# Patient Record
Sex: FEMALE | Race: WHITE | Employment: FULL TIME | URBAN - METROPOLITAN AREA
[De-identification: names, ages, dates, MRNs, and addresses within clinical notes are randomized per-mention and may not be internally consistent; named-entity substitution may affect disease eponyms.]

---

## 2017-03-21 NOTE — PROGRESS NOTES
"  SUBJECTIVE:                                                    Santa Kinsey is a 41 year old female who presents to clinic today for the following health issues:      HPI    URINARY TRACT SYMPTOMS     Onset: 2 weeks ago    Description:   Painful urination (Dysuria): no   Blood in urine (Hematuria): no   Delay in urine (Hesitancy): no - urinating to much    Intensity: moderate    Progression of Symptoms:  worsening    Accompanying Signs & Symptoms:  Fever/chills: no   Flank pain no   Nausea and vomiting: no   Any vaginal symptoms: vaginal discharge  Abdominal/Pelvic Pain: YES   History:   History of frequent UTI's: no   History of kidney stones: no   Sexually Active: no   Possibility of pregnancy: No    Precipitating factors:   no         Therapies Tried and outcome: Increase fluid intake, not helping    Started to notice increase in vaginal discharge. No other symptoms other than some bloating. No concerns for STDs. No pain with urination.     Hemorrhoids:  Has not had them in the past, thought that they popped the other day as she did see some blood in the toilet, not from bowel movement. She reports she did have some pain when this occurred, not having any since using the Preparation H. She reports that she wants to make sure they go away. She does report she does not eat a lot of fiber and occasionally gets constipated.       Problem list and histories reviewed & adjusted, as indicated.  Additional history: as documented        ROS:  C: NEGATIVE for fever, chills, change in weight  E/M: NEGATIVE for ear, mouth and throat problems  R: NEGATIVE for significant cough or SOB  CV: NEGATIVE for chest pain, palpitations or peripheral edema    OBJECTIVE:                                                    /64  Pulse 67  Temp 97.2  F (36.2  C) (Temporal)  Resp 12  Ht 5' 3.19\" (1.605 m)  Wt 116 lb 9.6 oz (52.9 kg)  SpO2 100%  BMI 20.53 kg/m2  Body mass index is 20.53 kg/(m^2).  Physical Exam "   Constitutional: She is oriented to person, place, and time. Vital signs are normal.   Cardiovascular: Normal rate, regular rhythm and normal heart sounds.    Pulmonary/Chest: Effort normal and breath sounds normal.   Abdominal: Soft. Normal appearance and bowel sounds are normal. There is no tenderness.   Genitourinary: Rectal exam shows external hemorrhoid. Rectal exam shows no internal hemorrhoid, no fissure, no tenderness and anal tone normal.   Neurological: She is alert and oriented to person, place, and time.   Skin: Skin is warm, dry and intact.       Diagnostic Test Results:  Results for orders placed or performed in visit on 03/23/17   *UA reflex to Microscopic and Culture ([facility] (except [place] and [place])   Result Value Ref Range    Color Urine Yellow     Appearance Urine Clear     Glucose Urine Negative NEG mg/dL    Bilirubin Urine Negative NEG    Ketones Urine Negative NEG mg/dL    Specific Gravity Urine 1.010 1.003 - 1.035    Blood Urine Trace (A) NEG    pH Urine 7.0 5.0 - 7.0 pH    Protein Albumin Urine Negative NEG mg/dL    Urobilinogen Urine 0.2 0.2 - 1.0 EU/dL    Nitrite Urine Negative NEG    Leukocyte Esterase Urine Small (A) NEG    Source Midstream Urine    Urine Microscopic   Result Value Ref Range    WBC Urine O - 2 0 - 2 /HPF    RBC Urine O - 2 0 - 2 /HPF    Squamous Epithelial /LPF Urine Few FEW /LPF    Bacteria Urine Few (A) NEG /HPF   Wet prep   Result Value Ref Range    Specimen Description Vagina     Wet Prep No Trichomonas seen  Clue cells seen  Yeast seen   (A)     Micro Report Status FINAL 03/23/2017         ASSESSMENT/PLAN:                                                        1. Bacterial vaginosis  - Treat with Flagyl 500mg BID X 7 days.   - Hand out discussed with patient.     2. Candidiasis of vulva and vagina  - Treatment as indicated below   - fluconazole (DIFLUCAN) 150 MG tablet; Take 1 tablet (150 mg) by mouth once for 1 dose  Dispense:  1 tablet; Refill: 0    3. External hemorrhoids  - Discussed the importance of a high fiber diet and increased water   intake.   - Will give Anusol to see if this helps  - Continue to monitor symptoms.   - Will re-evaluate next week when I see her back.   - No bleeding was present on exam and the hemorrhoids were very small without thrombus.   - hydrocortisone (ANUSOL-HC) 2.5 % cream; Place rectally 2 times   daily  Dispense: 28.35 g; Refill: 0    See Patient Instructions      Patient had multiple concerns at visit. Covered as much as we could today. She will make an appointment next week for me to evaluate her foot wart with possible freezing. We will also recheck her hemorrhoids at this time.       KALA Dee [facility]          Answers for HPI/ROS submitted by the patient on 3/23/2017   If you checked off any problems, how difficult have these problems made it for you to do your work, take care of things at home, or get along with other people?: Not difficult at all  PHQ9 TOTAL SCORE: 4  TRACEY 7 TOTAL SCORE: 15

## 2017-03-23 ENCOUNTER — OFFICE VISIT (OUTPATIENT)
Dept: FAMILY MEDICINE | Facility: OTHER | Age: 42
End: 2017-03-23
Payer: COMMERCIAL

## 2017-03-23 VITALS
BODY MASS INDEX: 20.66 KG/M2 | TEMPERATURE: 97.2 F | HEART RATE: 67 BPM | DIASTOLIC BLOOD PRESSURE: 64 MMHG | RESPIRATION RATE: 12 BRPM | OXYGEN SATURATION: 100 % | WEIGHT: 116.6 LBS | HEIGHT: 63 IN | SYSTOLIC BLOOD PRESSURE: 108 MMHG

## 2017-03-23 DIAGNOSIS — K64.4 EXTERNAL HEMORRHOIDS: ICD-10-CM

## 2017-03-23 DIAGNOSIS — B37.31 CANDIDIASIS OF VULVA AND VAGINA: ICD-10-CM

## 2017-03-23 DIAGNOSIS — N76.0 BACTERIAL VAGINOSIS: Primary | ICD-10-CM

## 2017-03-23 DIAGNOSIS — B96.89 BACTERIAL VAGINOSIS: Primary | ICD-10-CM

## 2017-03-23 LAB
ALBUMIN UR-MCNC: NEGATIVE MG/DL
APPEARANCE UR: CLEAR
BACTERIA #/AREA URNS HPF: ABNORMAL /HPF
BILIRUB UR QL STRIP: NEGATIVE
COLOR UR AUTO: YELLOW
GLUCOSE UR STRIP-MCNC: NEGATIVE MG/DL
HGB UR QL STRIP: ABNORMAL
KETONES UR STRIP-MCNC: NEGATIVE MG/DL
LEUKOCYTE ESTERASE UR QL STRIP: ABNORMAL
MICRO REPORT STATUS: ABNORMAL
NITRATE UR QL: NEGATIVE
NON-SQ EPI CELLS #/AREA URNS LPF: ABNORMAL /LPF
PH UR STRIP: 7 PH (ref 5–7)
RBC #/AREA URNS AUTO: ABNORMAL /HPF (ref 0–2)
SP GR UR STRIP: 1.01 (ref 1–1.03)
SPECIMEN SOURCE: ABNORMAL
URN SPEC COLLECT METH UR: ABNORMAL
UROBILINOGEN UR STRIP-ACNC: 0.2 EU/DL (ref 0.2–1)
WBC #/AREA URNS AUTO: ABNORMAL /HPF (ref 0–2)
WET PREP SPEC: ABNORMAL

## 2017-03-23 PROCEDURE — 99214 OFFICE O/P EST MOD 30 MIN: CPT | Performed by: NURSE PRACTITIONER

## 2017-03-23 PROCEDURE — 81001 URINALYSIS AUTO W/SCOPE: CPT | Performed by: NURSE PRACTITIONER

## 2017-03-23 PROCEDURE — 87210 SMEAR WET MOUNT SALINE/INK: CPT | Performed by: NURSE PRACTITIONER

## 2017-03-23 RX ORDER — METRONIDAZOLE 500 MG/1
500 TABLET ORAL 2 TIMES DAILY
Qty: 14 TABLET | Refills: 0 | Status: SHIPPED | OUTPATIENT
Start: 2017-03-23 | End: 2017-09-25

## 2017-03-23 RX ORDER — FLUCONAZOLE 150 MG/1
150 TABLET ORAL ONCE
Qty: 1 TABLET | Refills: 0 | Status: SHIPPED | OUTPATIENT
Start: 2017-03-23 | End: 2017-03-23

## 2017-03-23 ASSESSMENT — ANXIETY QUESTIONNAIRES
7. FEELING AFRAID AS IF SOMETHING AWFUL MIGHT HAPPEN: 2 = MORE THAN HALF THE DAYS
GAD7 TOTAL SCORE: 15
GAD7 TOTAL SCORE: 15

## 2017-03-23 ASSESSMENT — PATIENT HEALTH QUESTIONNAIRE - PHQ9
10. IF YOU CHECKED OFF ANY PROBLEMS, HOW DIFFICULT HAVE THESE PROBLEMS MADE IT FOR YOU TO DO YOUR WORK, TAKE CARE OF THINGS AT HOME, OR GET ALONG WITH OTHER PEOPLE: NOT DIFFICULT AT ALL
SUM OF ALL RESPONSES TO PHQ QUESTIONS 1-9: 4

## 2017-03-23 ASSESSMENT — PAIN SCALES - GENERAL: PAINLEVEL: MILD PAIN (3)

## 2017-03-23 NOTE — NURSING NOTE
"Chief Complaint   Patient presents with     UTI     Panel Management     height, tdap, lipid, dap, phq9       Initial /64  Pulse 67  Temp 97.2  F (36.2  C) (Temporal)  Resp 12  Ht 5' 3.19\" (1.605 m)  Wt 116 lb 9.6 oz (52.9 kg)  SpO2 100%  BMI 20.53 kg/m2 Estimated body mass index is 20.53 kg/(m^2) as calculated from the following:    Height as of this encounter: 5' 3.19\" (1.605 m).    Weight as of this encounter: 116 lb 9.6 oz (52.9 kg).  Medication Reconciliation: complete  Robyn Beatty CMA (AAMA)    "

## 2017-03-23 NOTE — MR AVS SNAPSHOT
After Visit Summary   3/23/2017    Santa Kinsey    MRN: [id number]           Patient Information     Date Of Birth          1975        Visit Information        Provider Department      3/23/2017 6:30 PM Julia Christensen APRN CNP [facility]        Today's Diagnoses     Bacterial vaginosis    -  1    Candidiasis of vulva and vagina        External hemorrhoids          Care Instructions    - Start antibiotic treatment for bacterial vaginosis  - Start antifungal one time dose for yeast infection  - If you develop fevers, chills, or increased abdominal pain please go into the emergency room.   - If your symptoms do not improve with treatment please return to clinic.   - Increase your fiber intake and start the Anusol cream and follow up next Tuesday in clinic.       KALA Dee CNP    Bacterial Vaginosis    You have a vaginal infection called bacterial vaginosis (BV). Both good and bad bacteria are present in a healthy vagina. BV occurs when these bacteria get out of balance. The number of bad bacteria increase. And the number of good bacteria decrease.  BV may or may not cause symptoms. If symptoms do occur, they can include:    Thin, gray, milky-white, or sometimes green discharge    Unpleasant odor or  fishy  smell    Itching, burning, or pain in or around the vagina  It is not known what causes BV, but certain factors can make the problem more likely. This can include:    Douching    Having sex with a new partner    Having sex with more than one partner  BV will sometimes go away on its own. But treatment is usually recommended. This is because untreated BV can increase the risk of more serious health problems such as:    Pelvic inflammatory disease (PID)     delivery (giving birth to a baby early if you re pregnant)    HIV and certain other sexually transmitted diseases (STDs)    Infection after surgery on the reproductive organs  Home care  General care    BV  is most often treated with medicines called antibiotics. These may be given as pills or as a vaginal cream. If antibiotics are prescribed, be sure to use them exactly as directed. Also, be sure to complete all of the medicine, even if your symptoms go away.    Avoid douching or having sex during treatment.    If you have sex with a female partner, ask your healthcare provider if she should also be treated.  Prevention    Limit or avoid douching.    Avoid having sex. If you do have sex, then take steps to lower your risk:    Use condoms when having sex.    Limit the number of partners you have sex with.  Follow-up care  Follow up with your healthcare provider, or as advised.  When to seek medical advice  Call your healthcare provider right away if:    You have a fever of 100.4 F (38 C) or higher, or as directed by your provider.    Your symptoms worsen, or they don t go away within a few days of starting treatment.    You have new pain in the lower belly or pelvic region.    You have side effects that bother you or a reaction to the pills or cream you re prescribed.    You or any partners you have sex with have new symptoms, such as a rash, joint pain, or sores.    7524-7167 [employer]. [address] [place] [address]. All rights reserved. This information is not intended as a substitute for professional medical care. Always follow your healthcare professional's instructions.              Follow-ups after your visit        Follow-up notes from your care team     Return if symptoms worsen or fail to improve.      Your next 10 appointments already scheduled     Mar 28, 2017  4:20 PM CDT   Office Visit with KALA Lau CNP   [facility] ([facility]    [address] [place] [id number]   [phone]           Bring a current list of meds and any records pertaining to this visit.  For Physicals, please bring immunization records and any  "forms needing to be filled out.  Please arrive 10 minutes early to complete paperwork.              Who to contact     If you have questions or need follow up information about today's clinic visit or your schedule please contact [facility] directly at [phone].  Normal or non-critical lab and imaging results will be communicated to you by MyChart, letter or phone within 4 business days after the clinic has received the results. If you do not hear from us within 7 days, please contact the clinic through [employer] or phone. If you have a critical or abnormal lab result, we will notify you by phone as soon as possible.  Submit refill requests through [employer] or call your pharmacy and they will forward the refill request to us. Please allow 3 business days for your refill to be completed.          Additional Information About Your Visit        [employer] Information     [employer] gives you secure access to your electronic health record. If you see a primary care provider, you can also send messages to your care team and make appointments. If you have questions, please call your primary care clinic.  If you do not have a primary care provider, please call [phone] and they will assist you.        Care EveryWhere ID     This is your Care EveryWhere ID. This could be used by other organizations to access your [facility] medical records  [id number]        Your Vitals Were     Pulse Temperature Respirations Height Pulse Oximetry BMI (Body Mass Index)    67 97.2  F (36.2  C) (Temporal) 12 5' 3.19\" (1.605 m) 100% 20.53 kg/m2       Blood Pressure from Last 3 Encounters:   03/23/17 108/64   10/10/16 92/58   03/25/16 94/60    Weight from Last 3 Encounters:   03/23/17 116 lb 9.6 oz (52.9 kg)   10/10/16 112 lb (50.8 kg)   03/25/16 114 lb (51.7 kg)              We Performed the Following     *UA reflex to Microscopic and Culture ([facility] (except [place] and [place])     Urine " Microscopic     Wet prep          Today's Medication Changes          These changes are accurate as of: 3/23/17  6:52 PM.  If you have any questions, ask your nurse or doctor.               Start taking these medicines.        Dose/Directions    fluconazole 150 MG tablet   Commonly known as:  DIFLUCAN   Used for:  Candidiasis of vulva and vagina   Started by:  Julia Christensen APRN CNP        Dose:  150 mg   Take 1 tablet (150 mg) by mouth once for 1 dose   Quantity:  1 tablet   Refills:  0       hydrocortisone 2.5 % cream   Commonly known as:  ANUSOL-HC   Used for:  External hemorrhoids   Started by:  Julia Christensen APRN CNP        Place rectally 2 times daily   Quantity:  28.35 g   Refills:  0       metroNIDAZOLE 500 MG tablet   Commonly known as:  FLAGYL   Started by:  Julia Christensen APRN CNP        Dose:  500 mg   Take 1 tablet (500 mg) by mouth 2 times daily   Quantity:  14 tablet   Refills:  0            Where to get your medicines      These medications were sent to [id number] - [place], [place] - [address]  [address] [address]    Hours:  [place] [place] - numbers unchanged   9/8/03  Phone:  [phone]     fluconazole 150 MG tablet    hydrocortisone 2.5 % cream    metroNIDAZOLE 500 MG tablet                Primary Care Provider Office Phone # Fax #    Annemarie Stovall [phone]       [facility]  [address] [facility] [address]        Thank you!     Thank you for choosing [facility]  for your care. Our goal is always to provide you with excellent care. Hearing back from our patients is one way we can continue to improve our services. Please take a few minutes to complete the written survey that you may receive in the mail after your visit with us. Thank you!             Your Updated Medication List - Protect others around you: Learn how to safely use, store and throw away your medicines at www.disposemymeds.org.           This list is accurate as of: 3/23/17  6:52 PM.  Always use your most recent med list.                   Brand Name Dispense Instructions for use    fluconazole 150 MG tablet    DIFLUCAN    1 tablet    Take 1 tablet (150 mg) by mouth once for 1 dose       hydrocortisone 2.5 % cream    ANUSOL-HC    28.35 g    Place rectally 2 times daily       metroNIDAZOLE 500 MG tablet    FLAGYL    14 tablet    Take 1 tablet (500 mg) by mouth 2 times daily

## 2017-03-23 NOTE — LETTER
My Depression Action Plan  Name: Santa Kinsey   Date of Birth 1975  Date: 3/21/2017    My doctor: Annemarie Stovall   My clinic: [address]  [facility] [id number] [phone]          GREEN    ZONE   Good Control    What it looks like:     Things are going generally well. You have normal up s and down s. You may even feel depressed from time to time, but bad moods usually last less than a day.   What you need to do:  1. Continue to care for yourself (see self care plan)  2. Check your depression survival kit and update it as needed  3. Follow your physician s recommendations including any medication.  4. Do not stop taking medication unless you consult with your physician first.           YELLOW         ZONE Getting Worse    What it looks like:     Depression is starting to interfere with your life.     It may be hard to get out of bed; you may be starting to isolate yourself from others.    Symptoms of depression are starting to last most all day and this has happened for several days.     You may have suicidal thoughts but they are not constant.   What you need to do:     1. Call your care team, your response to treatment will improve if you keep your care team informed of your progress. Yellow periods are signs an adjustment may need to be made.     2. Continue your self-care, even if you have to fake it!    3. Talk to someone in your support network    4. Open up your depression survival kit           RED    ZONE Medical Alert - Get Help    What it looks like:     Depression is seriously interfering with your life.     You may experience these or other symptoms: You can t get out of bed most days, can t work or engage in other necessary activities, you have trouble taking care of basic hygiene, or basic responsibilities, thoughts of suicide or death that will not go away, self-injurious behavior.     What you need to do:  1. Call your care team and  request a same-day appointment. If they are not available (weekends or after hours) call your local crisis line, emergency room or 911.      Electronically signed by: Sandra Nava, March 21, 2017    Depression Self Care Plan / Survival Kit    Self-Care for Depression  Here s the deal. Your body and mind are really not as separate as most people think.  What you do and think affects how you feel and how you feel influences what you do and think. This means if you do things that people who feel good do, it will help you feel better.  Sometimes this is all it takes.  There is also a place for medication and therapy depending on how severe your depression is, so be sure to consult with your medical provider and/ or Behavioral Health Consultant if your symptoms are worsening or not improving.     In order to better manage my stress, I will:    Exercise  Get some form of exercise, every day. This will help reduce pain and release endorphins, the  feel good  chemicals in your brain. This is almost as good as taking antidepressants!  This is not the same as joining a gym and then never going! (they count on that by the way ) It can be as simple as just going for a walk or doing some gardening, anything that will get you moving.      Hygiene   Maintain good hygiene (Get out of bed in the morning, Make your bed, Brush your teeth, Take a shower, and Get dressed like you were going to work, even if you are unemployed).  If your clothes don't fit try to get ones that do.    Diet  I will strive to eat foods that are good for me, drink plenty of water, and avoid excessive sugar, caffeine, alcohol, and other mood-altering substances.  Some foods that are helpful in depression are: complex carbohydrates, B vitamins, flaxseed, fish or fish oil, fresh fruits and vegetables.    Psychotherapy  I agree to participate in Individual Therapy (if recommended).    Medication  If prescribed medications, I agree to take them.  Missing doses  can result in serious side effects.  I understand that drinking alcohol, or other illicit drug use, may cause potential side effects.  I will not stop my medication abruptly without first discussing it with my provider.    Staying Connected With Others  I will stay in touch with my friends, family members, and my primary care provider/team.    Use your imagination  Be creative.  We all have a creative side; it doesn t matter if it s oil painting, sand castles, or mud pies! This will also kick up the endorphins.    Witness Beauty  (AKA stop and smell the roses) Take a look outside, even in mid-winter. Notice colors, textures. Watch the squirrels and birds.     Service to others  Be of service to others.  There is always someone else in need.  By helping others we can  get out of ourselves  and remember the really important things.  This also provides opportunities for practicing all the other parts of the program.    Humor  Laugh and be silly!  Adjust your TV habits for less news and crime-drama and more comedy.    Control your stress  Try breathing deep, massage therapy, biofeedback, and meditation. Find time to relax each day.     My support system    Clinic Contact:  Phone number:    Contact 1:  Phone number:    Contact 2:  Phone number:    Yazidism/:  Phone number:    Therapist:  Phone number:    Local crisis center:    Phone number:    Other community support:  Phone number:

## 2017-03-23 NOTE — PATIENT INSTRUCTIONS
- Start antibiotic treatment for bacterial vaginosis  - Start antifungal one time dose for yeast infection  - If you develop fevers, chills, or increased abdominal pain please go into the emergency room.   - If your symptoms do not improve with treatment please return to clinic.   - Increase your fiber intake and start the Anusol cream and follow up next Tuesday in clinic.       KALA Dee CNP    Bacterial Vaginosis    You have a vaginal infection called bacterial vaginosis (BV). Both good and bad bacteria are present in a healthy vagina. BV occurs when these bacteria get out of balance. The number of bad bacteria increase. And the number of good bacteria decrease.  BV may or may not cause symptoms. If symptoms do occur, they can include:    Thin, gray, milky-white, or sometimes green discharge    Unpleasant odor or  fishy  smell    Itching, burning, or pain in or around the vagina  It is not known what causes BV, but certain factors can make the problem more likely. This can include:    Douching    Having sex with a new partner    Having sex with more than one partner  BV will sometimes go away on its own. But treatment is usually recommended. This is because untreated BV can increase the risk of more serious health problems such as:    Pelvic inflammatory disease (PID)     delivery (giving birth to a baby early if you re pregnant)    HIV and certain other sexually transmitted diseases (STDs)    Infection after surgery on the reproductive organs  Home care  General care    BV is most often treated with medicines called antibiotics. These may be given as pills or as a vaginal cream. If antibiotics are prescribed, be sure to use them exactly as directed. Also, be sure to complete all of the medicine, even if your symptoms go away.    Avoid douching or having sex during treatment.    If you have sex with a female partner, ask your healthcare provider if she should also be treated.  Prevention    Limit  or avoid douching.    Avoid having sex. If you do have sex, then take steps to lower your risk:    Use condoms when having sex.    Limit the number of partners you have sex with.  Follow-up care  Follow up with your healthcare provider, or as advised.  When to seek medical advice  Call your healthcare provider right away if:    You have a fever of 100.4 F (38 C) or higher, or as directed by your provider.    Your symptoms worsen, or they don t go away within a few days of starting treatment.    You have new pain in the lower belly or pelvic region.    You have side effects that bother you or a reaction to the pills or cream you re prescribed.    You or any partners you have sex with have new symptoms, such as a rash, joint pain, or sores.    6859-8731 [employer]. [address] [place] [address], [place] [address]. All rights reserved. This information is not intended as a substitute for professional medical care. Always follow your healthcare professional's instructions.

## 2017-03-24 ASSESSMENT — PATIENT HEALTH QUESTIONNAIRE - PHQ9: SUM OF ALL RESPONSES TO PHQ QUESTIONS 1-9: 4

## 2017-03-24 ASSESSMENT — ANXIETY QUESTIONNAIRES: GAD7 TOTAL SCORE: 15

## 2017-03-24 NOTE — PROGRESS NOTES
SUBJECTIVE:                                                    Santa Kinsey is a 41 year old female who presents to clinic today for the following health issues:      HPI    Hemorrhoids     Onset: long time    Description:   Pain: YES  Itching: no     Accompanying Signs & Symptoms:  Blood streaked toilet paper: no   Blood in stool: Streaks  Changes in stool pattern: no    History:   Any previous GI studies done:none  Family History of colon cancer: no     Precipitating factors:   None    Alleviating factors:  None     Therapies Tried and outcome: chewable fiber gummies- no change noticed yet, tried metamucil in the past- no change  Wants to talk about freezing corn on foot today.     Patient reports that she has not seen much improvement with the Anusol cream and is curious if she can have the hemorrhoids removed.   She has started the fiber gummy's and has not seen much of a change yet. She has had relieve of her bloating and vaginal discharge with the BV treatment. Denies abdominal pain. Streaks of the blood from the hemorrhoids. No blood in stool.       Problem list and histories reviewed & adjusted, as indicated.  Additional history: as documented      ROS:  C: NEGATIVE for fever, chills, change in weight    OBJECTIVE:                                                    BP 94/62  Pulse 66  Temp 98  F (36.7  C) (Temporal)  Resp 16  Wt 116 lb 12.8 oz (53 kg)  SpO2 100%  BMI 20.57 kg/m2  Body mass index is 20.57 kg/(m^2).  GENERAL: healthy, alert and no distress  RESP: lungs clear to auscultation - no rales, rhonchi or wheezes  CV: regular rate and rhythm, normal S1 S2, no S3 or S4, no murmur, click or rub, no peripheral edema and peripheral pulses strong  RECTAL: normal sphincter tone, no rectal masses  SKIN: no suspicious lesions or rashes, small area along the plantar surface of the left foot skin circular region with thick area of skin roughed edged, consistent with a callus vs plantar wart.   PSYCH:  mentation appears normal, affect normal/bright    Patient and site identified. Procedure explained to patient. discussed diagnosis probably corn could possibly be plantar wart, will use cryo to see how this responds. Area along the foot identified and area was frozen with 3 cryo cycles with liquid nitrogen. Patient tolerated and home care instructions provided.        ASSESSMENT/PLAN:                                                      1. External hemorrhoids  - Recommend continuing with Anusol cream and fiber diet.   - Consult with general surgery about having clippings. I did not notice any internal hemorrhoids on exam. I would recommend that you monitor for bleeding and increase in pain.   - GENERAL SURG ADULT REFERRAL    2. Corn or callus  - Likely a corn/callus. Will try cryo today as it does have characteristics of a plantar wart. If this does not work I would recommend podiatry for removal since she has failed numerous OTC treatments.     3. Corns and callosities    - DESTRUC BENIGN/PREMAL,1ST LESION [82937]    See Patient Instructions    KALA Dee [facility]          Answers for HPI/ROS submitted by the patient on 3/28/2017   If you checked off any problems, how difficult have these problems made it for you to do your work, take care of things at home, or get along with other people?: Not difficult at all  PHQ9 TOTAL SCORE: 3

## 2017-03-28 ENCOUNTER — OFFICE VISIT (OUTPATIENT)
Dept: FAMILY MEDICINE | Facility: OTHER | Age: 42
End: 2017-03-28
Payer: COMMERCIAL

## 2017-03-28 VITALS
OXYGEN SATURATION: 100 % | RESPIRATION RATE: 16 BRPM | WEIGHT: 116.8 LBS | SYSTOLIC BLOOD PRESSURE: 94 MMHG | TEMPERATURE: 98 F | DIASTOLIC BLOOD PRESSURE: 62 MMHG | BODY MASS INDEX: 20.57 KG/M2 | HEART RATE: 66 BPM

## 2017-03-28 DIAGNOSIS — K64.4 EXTERNAL HEMORRHOIDS: Primary | ICD-10-CM

## 2017-03-28 DIAGNOSIS — L84 CORNS AND CALLOSITIES: ICD-10-CM

## 2017-03-28 DIAGNOSIS — L84 CORN OR CALLUS: ICD-10-CM

## 2017-03-28 PROCEDURE — 99213 OFFICE O/P EST LOW 20 MIN: CPT | Performed by: NURSE PRACTITIONER

## 2017-03-28 ASSESSMENT — PATIENT HEALTH QUESTIONNAIRE - PHQ9
10. IF YOU CHECKED OFF ANY PROBLEMS, HOW DIFFICULT HAVE THESE PROBLEMS MADE IT FOR YOU TO DO YOUR WORK, TAKE CARE OF THINGS AT HOME, OR GET ALONG WITH OTHER PEOPLE: NOT DIFFICULT AT ALL
SUM OF ALL RESPONSES TO PHQ QUESTIONS 1-9: 3

## 2017-03-28 ASSESSMENT — PAIN SCALES - GENERAL: PAINLEVEL: NO PAIN (0)

## 2017-03-28 NOTE — MR AVS SNAPSHOT
After Visit Summary   3/28/2017    Santa Kinsey    MRN: [id number]           Patient Information     Date Of Birth          1975        Visit Information        Provider Department      3/28/2017 4:20 PM Julia Christensen APRN CNP [facility]        Today's Diagnoses     External hemorrhoids    -  1    Corn or callus          Care Instructions    Wash entire area off with soap in 2 hours   - Area may blister, if it does, do not pop, keep covered  - May use OTC product (Compound W) if corn returns, only use after area has completely healed       Apply nightly and cover with bandage   - Can re-treat in clinic in one month     - Follow up with General surgery regarding your hemorrhoids.   - Continue Anusol cream and fiber diet   - Return to me in 1 month.     KALA Dee CNP          Follow-ups after your visit        Additional Services     GENERAL SURG ADULT REFERRAL       Your provider has referred you to: [facility]: [facility] [phone]   [email or web address]    Please be aware that coverage of these services is subject to the terms and limitations of your health insurance plan.  Call member services at your health plan with any benefit or coverage questions.      Please bring the following with you to your appointment:    (1) Any X-Rays, CTs or MRIs which have been performed.  Contact the facility where they were done to arrange for  prior to your scheduled appointment.   (2) List of current medications   (3) This referral request   (4) Any documents/labs given to you for this referral                  Follow-up notes from your care team     Return in about 1 month (around 4/28/2017).      Your next 10 appointments already scheduled     Apr 21, 2017 10:15 AM CDT   New Visit with Aureliano Savage MD   [facility]    [address]  [place] [address]    [phone]              Who to contact     If you have questions or need follow up information about today's clinic visit or your schedule please contact [facility] directly at [phone].  Normal or non-critical lab and imaging results will be communicated to you by MyChart, letter or phone within 4 business days after the clinic has received the results. If you do not hear from us within 7 days, please contact the clinic through MyChart or phone. If you have a critical or abnormal lab result, we will notify you by phone as soon as possible.  Submit refill requests through [employer] or call your pharmacy and they will forward the refill request to us. Please allow 3 business days for your refill to be completed.          Additional Information About Your Visit        [employer] Information     [employer] gives you secure access to your electronic health record. If you see a primary care provider, you can also send messages to your care team and make appointments. If you have questions, please call your primary care clinic.  If you do not have a primary care provider, please call [phone] and they will assist you.        Care EveryWhere ID     This is your Care EveryWhere ID. This could be used by other organizations to access your [place] medical records  [id number]        Your Vitals Were     Pulse Temperature Respirations Pulse Oximetry BMI (Body Mass Index)       66 98  F (36.7  C) (Temporal) 16 100% 20.57 kg/m2        Blood Pressure from Last 3 Encounters:   03/28/17 94/62   03/23/17 108/64   10/10/16 92/58    Weight from Last 3 Encounters:   03/28/17 116 lb 12.8 oz (53 kg)   03/23/17 116 lb 9.6 oz (52.9 kg)   10/10/16 112 lb (50.8 kg)              We Performed the Following     GENERAL SURG ADULT REFERRAL        Primary Care Provider Office Phone # Fax #    Annemarie Stovall [phone]       [facility]  [address] [facility] [address]        Thank you!      Thank you for choosing [facility]  for your care. Our goal is always to provide you with excellent care. Hearing back from our patients is one way we can continue to improve our services. Please take a few minutes to complete the written survey that you may receive in the mail after your visit with us. Thank you!             Your Updated Medication List - Protect others around you: Learn how to safely use, store and throw away your medicines at www.disposemymeds.org.          This list is accurate as of: 3/28/17  4:54 PM.  Always use your most recent med list.                   Brand Name Dispense Instructions for use    hydrocortisone 2.5 % cream    ANUSOL-HC    28.35 g    Place rectally 2 times daily       metroNIDAZOLE 500 MG tablet    FLAGYL    14 tablet    Take 1 tablet (500 mg) by mouth 2 times daily

## 2017-03-28 NOTE — PATIENT INSTRUCTIONS
Wash entire area off with soap in 2 hours   - Area may blister, if it does, do not pop, keep covered  - May use OTC product (Compound W) if corn returns, only use after area has completely healed       Apply nightly and cover with bandage   - Can re-treat in clinic in one month     - Follow up with General surgery regarding your hemorrhoids.   - Continue Anusol cream and fiber diet   - Return to me in 1 month.     KALA Dee CNP

## 2017-03-28 NOTE — NURSING NOTE
"Chief Complaint   Patient presents with     RECHECK     hemorrhoids     Health Maintenance     tetanus, lipid screening, bhavana phq9       Initial BP 94/62  Pulse 66  Temp 98  F (36.7  C) (Temporal)  Resp 16  Wt 116 lb 12.8 oz (53 kg)  SpO2 100%  BMI 20.57 kg/m2 Estimated body mass index is 20.57 kg/(m^2) as calculated from the following:    Height as of 3/23/17: 5' 3.19\" (1.605 m).    Weight as of this encounter: 116 lb 12.8 oz (53 kg).  Medication Reconciliation: complete  Robyn Beatty CMA (AAMA)    "

## 2017-03-29 ASSESSMENT — PATIENT HEALTH QUESTIONNAIRE - PHQ9: SUM OF ALL RESPONSES TO PHQ QUESTIONS 1-9: 3

## 2017-04-10 ENCOUNTER — MYC MEDICAL ADVICE (OUTPATIENT)
Dept: FAMILY MEDICINE | Facility: OTHER | Age: 42
End: 2017-04-10

## 2017-04-10 DIAGNOSIS — K64.4 EXTERNAL HEMORRHOIDS: Primary | ICD-10-CM

## 2017-04-10 DIAGNOSIS — B37.31 CANDIDIASIS OF VULVA AND VAGINA: Primary | ICD-10-CM

## 2017-04-10 RX ORDER — LIDOCAINE 5 G/100G
CREAM RECTAL; TOPICAL
Qty: 1 TUBE | Refills: 1 | Status: SHIPPED | OUTPATIENT
Start: 2017-04-10 | End: 2017-04-10

## 2017-04-10 RX ORDER — HYDROCORTISONE ACETATE 25 MG/1
25 SUPPOSITORY RECTAL 2 TIMES DAILY
Qty: 28 SUPPOSITORY | Refills: 0 | Status: SHIPPED | OUTPATIENT
Start: 2017-04-10 | End: 2017-04-10

## 2017-04-10 NOTE — TELEPHONE ENCOUNTER
Is she having abdominal pain or bloating like she was previously? Urinary symptoms?  Please triage.       If no acute abdominal symptoms I suspect this is the yeast infection again, I will put in a RX to take another Diflucan then repeat in 3 days X2. Medication pended ok to release.     KALA Dee CNP

## 2017-04-10 NOTE — TELEPHONE ENCOUNTER
I am assuming she is talking about her hemorrhoids. I can put in a suppository this may help more with pain relief. It is the same medication as the cream, often times patients get more relief with this. I will also put in a cream called lidocaine this will help numb the area and give more pain relief. She can use this up to 5 times daily. Continue to monitor for bleeding and follow up with General surgery to discuss possible clipping. Also recommend increasing fiber in diet along with water intake.         KALA Dee CNP

## 2017-04-11 RX ORDER — FLUCONAZOLE 150 MG/1
150 TABLET ORAL
Qty: 3 TABLET | Refills: 0 | Status: SHIPPED | OUTPATIENT
Start: 2017-04-11 | End: 2017-09-25

## 2017-04-21 ENCOUNTER — OFFICE VISIT (OUTPATIENT)
Dept: SURGERY | Facility: OTHER | Age: 42
End: 2017-04-21
Payer: COMMERCIAL

## 2017-04-21 VITALS — OXYGEN SATURATION: 99 % | TEMPERATURE: 98.1 F | HEART RATE: 77 BPM | BODY MASS INDEX: 19.9 KG/M2 | WEIGHT: 113 LBS

## 2017-04-21 DIAGNOSIS — K64.4 RESIDUAL HEMORRHOID TAGS: Primary | ICD-10-CM

## 2017-04-21 PROCEDURE — 99203 OFFICE O/P NEW LOW 30 MIN: CPT | Mod: 25 | Performed by: SPECIALIST

## 2017-04-21 PROCEDURE — 46600 DIAGNOSTIC ANOSCOPY SPX: CPT | Performed by: SPECIALIST

## 2017-04-21 NOTE — PATIENT INSTRUCTIONS
SMOKING CESSATION    What's in cigarette smoke? - Cigarette smoke contains over 4,000 chemicals. Nicotine is one of the main ingredients which is an insecticide/herbicide. It is poisonous to our nervous system, increases blood clotting risk, and decreases the body's defenses to fight off infection. Another chemical is Carbon Monoxide is an asphyxiating gas that permanently binds to blood cells and blocks the transport of oxygen. This leads to tissue death and decreases your metabolism. Tar is a chemical that coats your lungs and trachea which impairs new oxygen coming in and carbon dioxide getting out of your body.   How does smoking impact surgery? - Smoking is particularly hazardous with regards to surgery. Surgery puts stress on the body and a smoker's body is already under strain from these chemicals. Putting the two together, especially for an elective surgery, could be a recipe for disaster. Smoking before and after surgery increases your risk of heart problems, slow wound healing, delayed bone healing, blood clots, wound infection and anesthesia complications.   What are the benefits of quitting? - In 20 minutes your blood pressure will drop back down to normal. In 8 hours the carbon monoxide (a toxic gas) levels in your blood stream will drop by half, and oxygen levels will return to normal. In 48 hours your chance of having a heart attack will have decreased. All nicotine will have left your body. Your sense of taste and smell will return to a normal level. In 72 hours your bronchial tubes will relax, and your energy levels will increase. In 2 weeks your circulation will increase, and it will continue to improve for the next 10 weeks.    Recommendations for elective surgery - Ideally, patients should quit smoking 8 weeks before and at least 2 weeks after elective surgery in order to avoid complications. Simply cutting back on the amount of cigarettes smoked per day does not offer any benefit or decrease the  risk of poor wound healing, heart problems, and infection. Smokers should also start taking Vitamin C and B for two weeks before surgery and two weeks after surgery.    Ways to Stop Smokin. Nicotine patches, lozenges, or gum  2. Support Groups  3. Medications (see below)    List of Medications:  1. Varenicline Tartrate (CHANTIX)   2. Bupropion HCL (WELLBUTRIN, ZYBAN) - note: make sure Wellbutrin is for smoking cessation and not other issues   3. Nicotine Patch (NICODERM)   4. Nicotine Inhaler (NICOTROL)   5. Nicotine Gum Nicotine Polacrilex   6. Nicotine Lozenge: Nicotine Polacrilex (COMMIT)   * Center offers a smoking support group as well!  Please visit: [email or web address]  If you are interested in these, ask about getting a prescription or talk to your primary care doctor about what may be the best way for you to quit.

## 2017-04-21 NOTE — NURSING NOTE
"Chief Complaint   Patient presents with     Rectal Problem     hemorrhoids     Consult     Irena       Initial Pulse 77  Temp 98.1  F (36.7  C) (Temporal)  Wt 51.3 kg (113 lb)  SpO2 99%  BMI 19.9 kg/m2 Estimated body mass index is 19.9 kg/(m^2) as calculated from the following:    Height as of 3/23/17: 1.605 m (5' 3.19\").    Weight as of this encounter: 51.3 kg (113 lb).  Medication Reconciliation: complete    "

## 2017-04-21 NOTE — PROGRESS NOTES
Consult requested by Julia Christensen    Reason for consultation - hemorrhoids    HPI:  Pt is a 40 yo lady with a long standing hx of constipation.  She recently developed hemorrhoids and feels something coming down when she strains.  She was treated with a course of anusol with relief of her pain.  Her constipation and sx have resolved with the addition of fiber gummies to her diet.    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         Allergies   Allergen Reactions     No Known Drug Allergies      Social History   Substance Use Topics     Smoking status: Current Every Day Smoker     Packs/day: 0.75     Years: 8.00     Smokeless tobacco: Never Used     Alcohol use No     Family History   Problem Relation Age of Onset     CANCER Paternal Grandfather      Breast Cancer Paternal Aunt       ROS: 10 point ROS neg other than the symptoms noted above in the HPI.      PE:  B/P: Data Unavailable, T: 98.1, P: 77, R: Data Unavailable  General: well developed, well nourished thin WF who appears her stated age  HEENT: NC/AT, EOMI, (-)icterus, (-)injection  Neck: Supple, No JVD  Chest: CTA  Heart: S1, S2, (-)m/r/g  Abd: Soft, non tender, non distended, no masses  Rectal: No masses, small residual tag, no masses with straining, good sphincter tone  Anoscopy: No masses or internal hemorroids  Ext; Warm, no edema  Psych: AAOx3,  anxious  Neuro: No focal deficits    Impression/Plan:  This is a 40 yo lady with a hx of hemorrhoids and constipation that now appear resolved.  Her exam was esseniatally normal.  The are no sentinel piles with straining.  There is only one small residual tag that is asymptomatic.  I discussed these findings with the patient and she expressed limited understanding.  I explained there is no need for any procedure at this time and encourage to continue her fiber gummies and drink plenty of water.  She will f/u with me PRN.    Aureliano Savage MD, FACS

## 2017-04-21 NOTE — MR AVS SNAPSHOT
After Visit Summary   4/21/2017    Santa Kinsey    MRN: [id number]           Patient Information     Date Of Birth          1975        Visit Information        Provider Department      4/21/2017 10:15 AM Aureliano Savage MD [facility] Instructions    SMOKING CESSATION    What's in cigarette smoke? - Cigarette smoke contains over 4,000 chemicals. Nicotine is one of the main ingredients which is an insecticide/herbicide. It is poisonous to our nervous system, increases blood clotting risk, and decreases the body's defenses to fight off infection. Another chemical is Carbon Monoxide is an asphyxiating gas that permanently binds to blood cells and blocks the transport of oxygen. This leads to tissue death and decreases your metabolism. Tar is a chemical that coats your lungs and trachea which impairs new oxygen coming in and carbon dioxide getting out of your body.   How does smoking impact surgery? - Smoking is particularly hazardous with regards to surgery. Surgery puts stress on the body and a smoker's body is already under strain from these chemicals. Putting the two together, especially for an elective surgery, could be a recipe for disaster. Smoking before and after surgery increases your risk of heart problems, slow wound healing, delayed bone healing, blood clots, wound infection and anesthesia complications.   What are the benefits of quitting? - In 20 minutes your blood pressure will drop back down to normal. In 8 hours the carbon monoxide (a toxic gas) levels in your blood stream will drop by half, and oxygen levels will return to normal. In 48 hours your chance of having a heart attack will have decreased. All nicotine will have left your body. Your sense of taste and smell will return to a normal level. In 72 hours your bronchial tubes will relax, and your energy levels will increase. In 2 weeks your circulation will increase, and it will continue to  improve for the next 10 weeks.    Recommendations for elective surgery - Ideally, patients should quit smoking 8 weeks before and at least 2 weeks after elective surgery in order to avoid complications. Simply cutting back on the amount of cigarettes smoked per day does not offer any benefit or decrease the risk of poor wound healing, heart problems, and infection. Smokers should also start taking Vitamin C and B for two weeks before surgery and two weeks after surgery.    Ways to Stop Smokin. Nicotine patches, lozenges, or gum  2. Support Groups  3. Medications (see below)    List of Medications:  1. Varenicline Tartrate (CHANTIX)   2. Bupropion HCL (WELLBUTRIN, ZYBAN) - note: make sure Wellbutrin is for smoking cessation and not other issues   3. Nicotine Patch (NICODERM)   4. Nicotine Inhaler (NICOTROL)   5. Nicotine Gum Nicotine Polacrilex   6. Nicotine Lozenge: Nicotine Polacrilex (COMMIT)   * [facility] offers a smoking support group as well!  Please visit: [email or web address]  If you are interested in these, ask about getting a prescription or talk to your primary care doctor about what may be the best way for you to quit.               Follow-ups after your visit        Your next 10 appointments already scheduled     2017 10:15 AM CDT   New Visit with Aureliano Savage MD   [facility] ([facility])    [address] [place] [id number] [phone]              Who to contact     If you have questions or need follow up information about today's clinic visit or your schedule please contact [facility] directly at [phone].  Normal or non-critical lab and imaging results will be communicated to you by MyChart, letter or phone within 4 business days after the clinic has received the results. If you do not hear from us within 7 days, please contact the clinic through MyChart or phone. If you have a critical or  abnormal lab result, we will notify you by phone as soon as possible.  Submit refill requests through [employer] or call your pharmacy and they will forward the refill request to us. Please allow 3 business days for your refill to be completed.          Additional Information About Your Visit        [employer] Information     [employer] gives you secure access to your electronic health record. If you see a primary care provider, you can also send messages to your care team and make appointments. If you have questions, please call your primary care clinic.  If you do not have a primary care provider, please call [phone] and they will assist you.        Care EveryWhere ID     This is your Care EveryWhere ID. This could be used by other organizations to access your [place] medical records  [id number]        Your Vitals Were     Pulse Temperature Pulse Oximetry BMI (Body Mass Index)          77 98.1  F (36.7  C) (Temporal) 99% 19.9 kg/m2         Blood Pressure from Last 3 Encounters:   03/28/17 94/62   03/23/17 108/64   10/10/16 92/58    Weight from Last 3 Encounters:   04/21/17 51.3 kg (113 lb)   03/28/17 53 kg (116 lb 12.8 oz)   03/23/17 52.9 kg (116 lb 9.6 oz)              Today, you had the following     No orders found for display       Primary Care Provider Office Phone # Fax #    Annemarie Stovall [phone]       [facility]  [address] [facility] [address]        Thank you!     Thank you for choosing [facility]  for your care. Our goal is always to provide you with excellent care. Hearing back from our patients is one way we can continue to improve our services. Please take a few minutes to complete the written survey that you may receive in the mail after your visit with us. Thank you!             Your Updated Medication List - Protect others around you: Learn how to safely use, store and throw away your medicines at www.disposemymeds.org.          This list is  accurate as of: 4/21/17 10:09 AM.  Always use your most recent med list.                   Brand Name Dispense Instructions for use    fluconazole 150 MG tablet    DIFLUCAN    3 tablet    Take 1 tablet (150 mg) by mouth every 3 days       hydrocortisone 2.5 % cream    ANUSOL-HC    28.35 g    Place rectally 2 times daily       metroNIDAZOLE 500 MG tablet    FLAGYL    14 tablet    Take 1 tablet (500 mg) by mouth 2 times daily

## 2017-05-09 ENCOUNTER — MYC MEDICAL ADVICE (OUTPATIENT)
Dept: FAMILY MEDICINE | Facility: OTHER | Age: 42
End: 2017-05-09

## 2017-05-09 NOTE — TELEPHONE ENCOUNTER
Responded to [employer] message recommending pt schedule appt.  Awaiting response.  Candida Myrick, RN, BSN

## 2017-05-11 ENCOUNTER — OFFICE VISIT (OUTPATIENT)
Dept: FAMILY MEDICINE | Facility: OTHER | Age: 42
End: 2017-05-11
Payer: COMMERCIAL

## 2017-05-11 VITALS
HEART RATE: 68 BPM | RESPIRATION RATE: 12 BRPM | DIASTOLIC BLOOD PRESSURE: 74 MMHG | WEIGHT: 114 LBS | BODY MASS INDEX: 20.07 KG/M2 | TEMPERATURE: 98.2 F | SYSTOLIC BLOOD PRESSURE: 122 MMHG | OXYGEN SATURATION: 100 %

## 2017-05-11 DIAGNOSIS — N89.8 VAGINAL DISCHARGE: Primary | ICD-10-CM

## 2017-05-11 DIAGNOSIS — N76.0 BACTERIAL VAGINOSIS: ICD-10-CM

## 2017-05-11 DIAGNOSIS — B96.89 BACTERIAL VAGINOSIS: ICD-10-CM

## 2017-05-11 LAB
ALBUMIN UR-MCNC: NEGATIVE MG/DL
APPEARANCE UR: CLEAR
BILIRUB UR QL STRIP: NEGATIVE
COLOR UR AUTO: YELLOW
GLUCOSE UR STRIP-MCNC: NEGATIVE MG/DL
HGB UR QL STRIP: NEGATIVE
KETONES UR STRIP-MCNC: NEGATIVE MG/DL
LEUKOCYTE ESTERASE UR QL STRIP: NEGATIVE
MICRO REPORT STATUS: NORMAL
NITRATE UR QL: NEGATIVE
PH UR STRIP: 7 PH (ref 5–7)
SP GR UR STRIP: 1.01 (ref 1–1.03)
SPECIMEN SOURCE: NORMAL
URN SPEC COLLECT METH UR: NORMAL
UROBILINOGEN UR STRIP-ACNC: 0.2 EU/DL (ref 0.2–1)
WET PREP SPEC: NORMAL

## 2017-05-11 PROCEDURE — 99213 OFFICE O/P EST LOW 20 MIN: CPT | Performed by: FAMILY MEDICINE

## 2017-05-11 PROCEDURE — 87210 SMEAR WET MOUNT SALINE/INK: CPT | Performed by: FAMILY MEDICINE

## 2017-05-11 PROCEDURE — 81003 URINALYSIS AUTO W/O SCOPE: CPT | Performed by: FAMILY MEDICINE

## 2017-05-11 RX ORDER — METRONIDAZOLE 7.5 MG/G
1 GEL VAGINAL AT BEDTIME
Qty: 70 G | Refills: 3 | Status: SHIPPED | OUTPATIENT
Start: 2017-05-11 | End: 2017-05-16

## 2017-05-11 ASSESSMENT — PAIN SCALES - GENERAL: PAINLEVEL: MODERATE PAIN (5)

## 2017-05-11 NOTE — NURSING NOTE
"Chief Complaint   Patient presents with     Vaginal Problem     Panel Management     Tdap, Lipid, DAP, PHQ9       Initial /74  Pulse 68  Temp 98.2  F (36.8  C) (Temporal)  Resp 12  Wt 114 lb (51.7 kg)  SpO2 100%  BMI 20.07 kg/m2 Estimated body mass index is 20.07 kg/(m^2) as calculated from the following:    Height as of 3/23/17: 5' 3.19\" (1.605 m).    Weight as of this encounter: 114 lb (51.7 kg).  Medication Reconciliation: complete  Robyn Beatty CMA (AAMA)    "

## 2017-05-11 NOTE — MR AVS SNAPSHOT
After Visit Summary   2017    Santa Kinsey    MRN: [id number]           Patient Information     Date Of Birth          1975        Visit Information        Provider Department      2017 4:20 PM Brandy Starr MD [facility]        Today's Diagnoses     Vaginal discharge    -  1    Bacterial vaginosis          Care Instructions      Bacterial Vaginosis    You have a vaginal infection called bacterial vaginosis (BV). Both good and bad bacteria are present in a healthy vagina. BV occurs when these bacteria get out of balance. The number of bad bacteria increase. And the number of good bacteria decrease.  BV may or may not cause symptoms. If symptoms do occur, they can include:    Thin, gray, milky-white, or sometimes green discharge    Unpleasant odor or  fishy  smell    Itching, burning, or pain in or around the vagina  It is not known what causes BV, but certain factors can make the problem more likely. This can include:    Douching    Having sex with a new partner    Having sex with more than one partner  BV will sometimes go away on its own. But treatment is usually recommended. This is because untreated BV can increase the risk of more serious health problems such as:    Pelvic inflammatory disease (PID)     delivery (giving birth to a baby early if you re pregnant)    HIV and certain other sexually transmitted diseases (STDs)    Infection after surgery on the reproductive organs  Home care  General care    BV is most often treated with medicines called antibiotics. These may be given as pills or as a vaginal cream. If antibiotics are prescribed, be sure to use them exactly as directed. Also, be sure to complete all of the medicine, even if your symptoms go away.    Avoid douching or having sex during treatment.    If you have sex with a female partner, ask your healthcare provider if she should also be treated.  Prevention    Limit or avoid  douching.    Avoid having sex. If you do have sex, then take steps to lower your risk:    Use condoms when having sex.    Limit the number of partners you have sex with.  Follow-up care  Follow up with your healthcare provider, or as advised.  When to seek medical advice  Call your healthcare provider right away if:    You have a fever of 100.4 F (38 C) or higher, or as directed by your provider.    Your symptoms worsen, or they don t go away within a few days of starting treatment.    You have new pain in the lower belly or pelvic region.    You have side effects that bother you or a reaction to the pills or cream you re prescribed.    You or any partners you have sex with have new symptoms, such as a rash, joint pain, or sores.    8305-4640 [employer]. [address] [place] [address]. All rights reserved. This information is not intended as a substitute for professional medical care. Always follow your healthcare professional's instructions.              Follow-ups after your visit        Who to contact     If you have questions or need follow up information about today's clinic visit or your schedule please contact [facility] directly at [phone].  Normal or non-critical lab and imaging results will be communicated to you by MyChart, letter or phone within 4 business days after the clinic has received the results. If you do not hear from us within 7 days, please contact the clinic through Revetto[employer] or phone. If you have a critical or abnormal lab result, we will notify you by phone as soon as possible.  Submit refill requests through [employer] or call your pharmacy and they will forward the refill request to us. Please allow 3 business days for your refill to be completed.          Additional Information About Your Visit        SpinVox Information     [employer] gives you secure access to your electronic health record. If you see a primary care provider, you can also send  messages to your care team and make appointments. If you have questions, please call your primary care clinic.  If you do not have a primary care provider, please call [phone] and they will assist you.        Care EveryWhere ID     This is your Care EveryWhere ID. This could be used by other organizations to access your [facility] medical records  [id number]        Your Vitals Were     Pulse Temperature Respirations Pulse Oximetry BMI (Body Mass Index)       68 98.2  F (36.8  C) (Temporal) 12 100% 20.07 kg/m2        Blood Pressure from Last 3 Encounters:   05/11/17 122/74   03/28/17 94/62   03/23/17 108/64    Weight from Last 3 Encounters:   05/11/17 114 lb (51.7 kg)   04/21/17 113 lb (51.3 kg)   03/28/17 116 lb 12.8 oz (53 kg)              We Performed the Following     *UA reflex to Microscopic     Wet prep          Today's Medication Changes          These changes are accurate as of: 5/11/17  5:08 PM.  If you have any questions, ask your nurse or doctor.               Start taking these medicines.        Dose/Directions    metroNIDAZOLE 0.75 % vaginal gel   Commonly known as:  METROGEL   Used for:  Vaginal discharge, Bacterial vaginosis   Started by:  Brandy Starr MD        Dose:  1 applicator   Place 1 applicator (5 g) vaginally At Bedtime for 5 days And then 1-2 times a week to prevent the infection   Quantity:  70 g   Refills:  3            Where to get your medicines      These medications were sent to [id number] - [place], [place] - [address] [facility]  [address] [facility] [address]    Hours:  Former[facility] [place] - numbers unchanged   9/8/03  Phone:  [phone]     metroNIDAZOLE 0.75 % vaginal gel                Primary Care Provider Office Phone # Fax #    Annemarie Stovall [phone]       [facility]  [address] [facility] [address]        Thank you!     Thank you for choosing [facility]  for your care. Our goal is always to provide you with  excellent care. Hearing back from our patients is one way we can continue to improve our services. Please take a few minutes to complete the written survey that you may receive in the mail after your visit with us. Thank you!             Your Updated Medication List - Protect others around you: Learn how to safely use, store and throw away your medicines at www.disposemymeds.org.          This list is accurate as of: 5/11/17  5:08 PM.  Always use your most recent med list.                   Brand Name Dispense Instructions for use    fluconazole 150 MG tablet    DIFLUCAN    3 tablet    Take 1 tablet (150 mg) by mouth every 3 days       hydrocortisone 2.5 % cream    ANUSOL-HC    28.35 g    Place rectally 2 times daily       metroNIDAZOLE 0.75 % vaginal gel    METROGEL    70 g    Place 1 applicator (5 g) vaginally At Bedtime for 5 days And then 1-2 times a week to prevent the infection       metroNIDAZOLE 500 MG tablet    FLAGYL    14 tablet    Take 1 tablet (500 mg) by mouth 2 times daily

## 2017-05-11 NOTE — PATIENT INSTRUCTIONS
Bacterial Vaginosis    You have a vaginal infection called bacterial vaginosis (BV). Both good and bad bacteria are present in a healthy vagina. BV occurs when these bacteria get out of balance. The number of bad bacteria increase. And the number of good bacteria decrease.  BV may or may not cause symptoms. If symptoms do occur, they can include:    Thin, gray, milky-white, or sometimes green discharge    Unpleasant odor or  fishy  smell    Itching, burning, or pain in or around the vagina  It is not known what causes BV, but certain factors can make the problem more likely. This can include:    Douching    Having sex with a new partner    Having sex with more than one partner  BV will sometimes go away on its own. But treatment is usually recommended. This is because untreated BV can increase the risk of more serious health problems such as:    Pelvic inflammatory disease (PID)     delivery (giving birth to a baby early if you re pregnant)    HIV and certain other sexually transmitted diseases (STDs)    Infection after surgery on the reproductive organs  Home care  General care    BV is most often treated with medicines called antibiotics. These may be given as pills or as a vaginal cream. If antibiotics are prescribed, be sure to use them exactly as directed. Also, be sure to complete all of the medicine, even if your symptoms go away.    Avoid douching or having sex during treatment.    If you have sex with a female partner, ask your healthcare provider if she should also be treated.  Prevention    Limit or avoid douching.    Avoid having sex. If you do have sex, then take steps to lower your risk:    Use condoms when having sex.    Limit the number of partners you have sex with.  Follow-up care  Follow up with your healthcare provider, or as advised.  When to seek medical advice  Call your healthcare provider right away if:    You have a fever of 100.4 F (38 C) or higher, or as directed by your  provider.    Your symptoms worsen, or they don t go away within a few days of starting treatment.    You have new pain in the lower belly or pelvic region.    You have side effects that bother you or a reaction to the pills or cream you re prescribed.    You or any partners you have sex with have new symptoms, such as a rash, joint pain, or sores.    5542-5266 [employer]. [address] [place] [address], [place] [address]. All rights reserved. This information is not intended as a substitute for professional medical care. Always follow your healthcare professional's instructions.

## 2017-05-11 NOTE — LETTER
My Depression Action Plan  Name: Santa Kinsey   Date of Birth 1975  Date: 5/10/2017    My doctor: Annemarie Stovall   My clinic: [address]  [facility] [id number] [phone]          GREEN    ZONE   Good Control    What it looks like:     Things are going generally well. You have normal up s and down s. You may even feel depressed from time to time, but bad moods usually last less than a day.   What you need to do:  1. Continue to care for yourself (see self care plan)  2. Check your depression survival kit and update it as needed  3. Follow your physician s recommendations including any medication.  4. Do not stop taking medication unless you consult with your physician first.           YELLOW         ZONE Getting Worse    What it looks like:     Depression is starting to interfere with your life.     It may be hard to get out of bed; you may be starting to isolate yourself from others.    Symptoms of depression are starting to last most all day and this has happened for several days.     You may have suicidal thoughts but they are not constant.   What you need to do:     1. Call your care team, your response to treatment will improve if you keep your care team informed of your progress. Yellow periods are signs an adjustment may need to be made.     2. Continue your self-care, even if you have to fake it!    3. Talk to someone in your support network    4. Open up your depression survival kit           RED    ZONE Medical Alert - Get Help    What it looks like:     Depression is seriously interfering with your life.     You may experience these or other symptoms: You can t get out of bed most days, can t work or engage in other necessary activities, you have trouble taking care of basic hygiene, or basic responsibilities, thoughts of suicide or death that will not go away, self-injurious behavior.     What you need to do:  1. Call your care team and  request a same-day appointment. If they are not available (weekends or after hours) call your local crisis line, emergency room or 911.      Electronically signed by: Sury Espinoza, May 10, 2017    Depression Self Care Plan / Survival Kit    Self-Care for Depression  Here s the deal. Your body and mind are really not as separate as most people think.  What you do and think affects how you feel and how you feel influences what you do and think. This means if you do things that people who feel good do, it will help you feel better.  Sometimes this is all it takes.  There is also a place for medication and therapy depending on how severe your depression is, so be sure to consult with your medical provider and/ or Behavioral Health Consultant if your symptoms are worsening or not improving.     In order to better manage my stress, I will:    Exercise  Get some form of exercise, every day. This will help reduce pain and release endorphins, the  feel good  chemicals in your brain. This is almost as good as taking antidepressants!  This is not the same as joining a gym and then never going! (they count on that by the way ) It can be as simple as just going for a walk or doing some gardening, anything that will get you moving.      Hygiene   Maintain good hygiene (Get out of bed in the morning, Make your bed, Brush your teeth, Take a shower, and Get dressed like you were going to work, even if you are unemployed).  If your clothes don't fit try to get ones that do.    Diet  I will strive to eat foods that are good for me, drink plenty of water, and avoid excessive sugar, caffeine, alcohol, and other mood-altering substances.  Some foods that are helpful in depression are: complex carbohydrates, B vitamins, flaxseed, fish or fish oil, fresh fruits and vegetables.    Psychotherapy  I agree to participate in Individual Therapy (if recommended).    Medication  If prescribed medications, I agree to take them.  Missing doses can  result in serious side effects.  I understand that drinking alcohol, or other illicit drug use, may cause potential side effects.  I will not stop my medication abruptly without first discussing it with my provider.    Staying Connected With Others  I will stay in touch with my friends, family members, and my primary care provider/team.    Use your imagination  Be creative.  We all have a creative side; it doesn t matter if it s oil painting, sand castles, or mud pies! This will also kick up the endorphins.    Witness Beauty  (AKA stop and smell the roses) Take a look outside, even in mid-winter. Notice colors, textures. Watch the squirrels and birds.     Service to others  Be of service to others.  There is always someone else in need.  By helping others we can  get out of ourselves  and remember the really important things.  This also provides opportunities for practicing all the other parts of the program.    Humor  Laugh and be silly!  Adjust your TV habits for less news and crime-drama and more comedy.    Control your stress  Try breathing deep, massage therapy, biofeedback, and meditation. Find time to relax each day.     My support system    Clinic Contact:  Phone number:    Contact 1:  Phone number:    Contact 2:  Phone number:    [employer]/:  Phone number:    Therapist:  Phone number:    Local crisis center:    Phone number:    Other community support:  Phone number:

## 2017-09-25 ENCOUNTER — OFFICE VISIT (OUTPATIENT)
Dept: URGENT CARE | Facility: RETAIL CLINIC | Age: 42
End: 2017-09-25
Payer: COMMERCIAL

## 2017-09-25 VITALS — HEART RATE: 63 BPM | TEMPERATURE: 97.6 F | SYSTOLIC BLOOD PRESSURE: 109 MMHG | DIASTOLIC BLOOD PRESSURE: 62 MMHG

## 2017-09-25 DIAGNOSIS — B88.0: Primary | ICD-10-CM

## 2017-09-25 PROCEDURE — 99213 OFFICE O/P EST LOW 20 MIN: CPT | Performed by: PHYSICIAN ASSISTANT

## 2017-09-25 RX ORDER — HYDROXYZINE HYDROCHLORIDE 25 MG/1
25-50 TABLET, FILM COATED ORAL EVERY 6 HOURS PRN
Qty: 30 TABLET | Refills: 0 | Status: SHIPPED | OUTPATIENT
Start: 2017-09-25 | End: 2018-02-19

## 2017-09-25 RX ORDER — HYDROCORTISONE 2.5 %
CREAM (GRAM) TOPICAL
Qty: 60 G | Refills: 0 | Status: SHIPPED | OUTPATIENT
Start: 2017-09-25 | End: 2018-02-19

## 2017-09-25 NOTE — NURSING NOTE
"Chief Complaint   Patient presents with     Derm Problem     rash from working in the yard; began 2 days ago; both arms, legs; bumps are appearing, worsened, very itchy; tried calamine lotion       Initial /62 (BP Location: Left arm)  Pulse 63  Temp 97.6  F (36.4  C) (Oral) Estimated body mass index is 20.07 kg/(m^2) as calculated from the following:    Height as of 3/23/17: 5' 3.19\" (1.605 m).    Weight as of 5/11/17: 114 lb (51.7 kg).  Medication Reconciliation: complete  "

## 2017-09-25 NOTE — PATIENT INSTRUCTIONS
Use antihistamine as needed  Use steroid cream as needed  Please follow up with primary care provider if not improving, worsening or new symptoms or for any adverse reactions to medications.

## 2017-09-25 NOTE — PROGRESS NOTES
Chief Complaint   Patient presents with     Derm Problem     rash from working in the yard; began 2 days ago; both arms, legs; bumps are appearing, worsened, very itchy; tried calamine lotion      SUBJECTIVE:  Santa Kinsey is a 42 year old female who presents to the clinic today for a rash.  Onset of rash was 2 day(s) ago.   Rash is still present and improving.  Location of the rash: arms, chest, few on legs  Quality/symptoms of rash: itching   Symptoms are moderate and rash seems to be worsening.  Previous history of a similar rash? No  Recent exposure history: outdoors - in tall grass, picking weeds  Associated symptoms include: nothing.  Tried calamine lotion     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     Other, mixed, or unspecified nondependent drug abuse, unspecified     Drug /ETOH abuse (non-depend.)     Meds- none     Allergies   Allergen Reactions     No Known Drug Allergies         History   Smoking Status     Current Every Day Smoker     Packs/day: 0.75     Years: 8.00   Smokeless Tobacco     Never Used     ROS:  CONSTITUTIONAL:NEGATIVE for fever, chills  INTEGUMENTARY/SKIN: itchy rash  RESP:NEGATIVE for significant cough or wheezing    EXAM:   /62 (BP Location: Left arm)  Pulse 63  Temp 97.6  F (36.4  C) (Oral)  GENERAL: alert, no acute distress.  SKIN: Rash description:    Distribution: generalized  Location: arms, few on chest and legs  Color: red,  Lesion type: papular, isolated with excoriation  RESP: lungs clear to auscultation - no rales, rhonchi or wheezes  CV: regular rates and rhythm, normal S1 S2, no murmur noted    ASSESSMENT:  (B88.0) Cirilo  (primary encounter diagnosis)    PLAN:  hydrOXYzine (ATARAX) 25 MG tablet,   Hydrocortisone Acetate 2 % LOTN - first prescribed this, but pharmacy called back, it was too expensive  Change Rx to    hydrocortisone 2.5 % cream  Use antihistamine as needed  Use steroid cream as needed  Please follow up with primary care provider if not improving, worsening or new symptoms or for any adverse reactions to medications.     Suki Amaya PA-C  [facility]

## 2017-09-25 NOTE — MR AVS SNAPSHOT
After Visit Summary   9/25/2017    Santa Kinsey    MRN: [id number]           Patient Information     Date Of Birth          1975        Visit Information        Provider Department      9/25/2017 11:50 AM Suki Amaya PA-C [facility]        Today's Diagnoses     Chigger    -  1      Care Instructions    Use antihistamine as needed  Use steroid lotion  Please follow up with primary care provider if not improving, worsening or new symptoms or for any adverse reactions to medications.           Follow-ups after your visit        Who to contact     You can reach your care team any time of the day by calling [phone].  Notification of test results:  If you have an abnormal lab result, we will notify you by phone as soon as possible.         Additional Information About Your Visit        MyChart Information     K[employer] gives you secure access to your electronic health record. If you see a primary care provider, you can also send messages to your care team and make appointments. If you have questions, please call your primary care clinic.  If you do not have a primary care provider, please call [phone] and they will assist you.        Care EveryWhere ID     This is your Care EveryWhere ID. This could be used by other organizations to access your [facility] medical records  [id number]        Your Vitals Were     Pulse Temperature                63 97.6  F (36.4  C) (Oral)           Blood Pressure from Last 3 Encounters:   09/25/17 109/62   05/11/17 122/74   03/28/17 94/62    Weight from Last 3 Encounters:   05/11/17 114 lb (51.7 kg)   04/21/17 113 lb (51.3 kg)   03/28/17 116 lb 12.8 oz (53 kg)              Today, you had the following     No orders found for display         Today's Medication Changes          These changes are accurate as of: 9/25/17 12:30 PM.  If you have any questions, ask your nurse or doctor.               Start taking these medicines.         Dose/Directions    Hydrocortisone Acetate 2 % Lotn   Used for:  Chigger        Apply to affected areas twice daily as needed for itch   Quantity:  60 mL   Refills:  0       hydrOXYzine 25 MG tablet   Commonly known as:  ATARAX   Used for:  Chigger        Dose:  25-50 mg   Take 1-2 tablets (25-50 mg) by mouth every 6 hours as needed for itching   Quantity:  30 tablet   Refills:  0            Where to get your medicines      These medications were sent to [id number] - [place], [place] - [address]  [address] [facility] [address]    Hours:  [facility] [place] - numbers unchanged   9/8/03 kr Phone:  [phone]     Hydrocortisone Acetate 2 % Lotn    hydrOXYzine 25 MG tablet                Primary Care Provider Office Phone # Fax #    Julia Riley [phone] [address] [place] [address]        Equal Access to Services     [facility]: [address] hadasho Soomaali, waaxda luqadaha, qaybta kaalmada adeegyada, waxay dayday haychantelle noel . So [facility] [phone].    ATENCIÓN: Si habla español, tiene a contreras disposición servicios gratuitos de asistencia lingüística. [facility] [phone].    We comply with applicable federal civil rights laws and [place] laws. We do not discriminate on the basis of race, color, national origin, age, disability sex, sexual orientation or gender identity.            Thank you!     Thank you for choosing [facility]  for your care. Our goal is always to provide you with excellent care. Hearing back from our patients is one way we can continue to improve our services. Please take a few minutes to complete the written survey that you may receive in the mail after your visit with us. Thank you!             Your Updated Medication List - Protect others around you: Learn how to safely use, store and throw away your medicines at www.disposemymeds.org.          This list is accurate as of: 9/25/17 12:30 PM.  Always use your  most recent med list.                   Brand Name Dispense Instructions for use Diagnosis    hydrocortisone 2.5 % cream    ANUSOL-HC    28.35 g    Place rectally 2 times daily    External hemorrhoids       Hydrocortisone Acetate 2 % Lotn     60 mL    Apply to affected areas twice daily as needed for itch    Chigger       hydrOXYzine 25 MG tablet    ATARAX    30 tablet    Take 1-2 tablets (25-50 mg) by mouth every 6 hours as needed for itching    Chigger

## 2017-10-06 ENCOUNTER — MYC MEDICAL ADVICE (OUTPATIENT)
Dept: FAMILY MEDICINE | Facility: OTHER | Age: 42
End: 2017-10-06

## 2017-10-06 DIAGNOSIS — L84 CORN OR CALLUS: Primary | ICD-10-CM

## 2017-10-06 NOTE — TELEPHONE ENCOUNTER
I am sorry to hear that your calloses are not going away. I have no problems putting in a referral. This has been done please let patient know. I also recommend making sure that you have proper footwear this will help. You can also discuss this with Dr. Lema our podiatrist here at the [facility].     Let me know if you need anything else please let me know.     KALA Dee CNP

## 2017-10-24 ENCOUNTER — MYC MEDICAL ADVICE (OUTPATIENT)
Dept: PODIATRY | Facility: OTHER | Age: 42
End: 2017-10-24

## 2017-10-24 ENCOUNTER — OFFICE VISIT (OUTPATIENT)
Dept: PODIATRY | Facility: OTHER | Age: 42
End: 2017-10-24
Payer: COMMERCIAL

## 2017-10-24 VITALS — TEMPERATURE: 98.3 F | WEIGHT: 122 LBS | BODY MASS INDEX: 21.62 KG/M2 | HEIGHT: 63 IN

## 2017-10-24 DIAGNOSIS — B07.0 PLANTAR VERRUCA: Primary | ICD-10-CM

## 2017-10-24 PROCEDURE — 99203 OFFICE O/P NEW LOW 30 MIN: CPT | Mod: 25 | Performed by: PODIATRIST

## 2017-10-24 PROCEDURE — 17110 DESTRUCTION B9 LES UP TO 14: CPT | Performed by: PODIATRIST

## 2017-10-24 ASSESSMENT — PAIN SCALES - GENERAL: PAINLEVEL: SEVERE PAIN (7)

## 2017-10-24 NOTE — LETTER
10/24/2017         RE: Santa Kinsey  [address] [facility] [address]        Dear Colleague,    Thank you for referring your patient, Santa Kinsey, to the [facility]. Please see a copy of my visit note below.    HPI:  Santa Kinsey is a 42 year old female who is seen in consultation at the request of Julia Osborn CNP.    Pt presents for eval of:   (Onset, Location, L/R, Character, Treatments, Injury if yes)     Ongoing for about a year, callus/wart plantar Left foot base of 4th toe.  Sharp, stabbing, throbbing, dull ache, pain 7 w/pressure  1 liquid nitrogen clinic treatment, OTC wart treatments    Works as a [employer]. 40 hour work weeks on feet 1/2 of day    BMI is normal.    Review of Systems:  Patient denies fever, chills, rash, wound, stiffness, limping, numbness, weakness, heart burn, blood in stool, chest pain with activity, calf pain when walking, shortness of breath with activity, chronic cough, easy bleeding/bruising, swelling of ankles, excessive thirst, fatigue, depression, anxiety.       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     Other, mixed, or unspecified nondependent drug abuse, unspecified     Drug /ETOH abuse (non-depend.)        PAST SURGICAL HISTORY:   Past Surgical History:   Procedure Laterality Date     C APPENDECTOMY          MEDICATIONS:   Current Outpatient Prescriptions:      hydrOXYzine (ATARAX) 25 MG tablet, Take 1-2 tablets (25-50 mg) by mouth every 6 hours as needed for itching, Disp: 30 tablet, Rfl: 0     hydrocortisone 2.5 % cream, Apply thin layer to affected areas twice daily as needed for itch, Disp: 60 g, Rfl: 0     hydrocortisone (ANUSOL-HC) 2.5 % cream, Place rectally 2 times daily (Patient not taking: Reported on 9/25/2017), Disp: 28.35 g, Rfl: 0     ALLERGIES:    Allergies   Allergen  "Reactions     No Known Drug Allergies         SOCIAL HISTORY:   Social History     Social History     Marital status:      Spouse name: N/A     Number of children: N/A     Years of education: N/A     Occupational History     Not on file.     Social History Main Topics     Smoking status: Current Every Day Smoker     Packs/day: 0.75     Years: 8.00     Smokeless tobacco: Never Used     Alcohol use No     Drug use: No     Sexual activity: Not Currently     Partners: Male     Birth control/ protection: Surgical     Other Topics Concern      Service No     Blood Transfusions No     Caffeine Concern No     Occupational Exposure No     Hobby Hazards No     Sleep Concern No     Stress Concern Yes     Work, family.     Weight Concern No     Special Diet No     Back Care No     Exercise Yes     Bike Helmet Yes     Seat Belt Yes     Self-Exams No     hand-out of sbe given to patient     Parent/Sibling W/ Cabg, Mi Or Angioplasty Before 65f 55m? No     Social History Narrative        FAMILY HISTORY:   Family History   Problem Relation Age of Onset     CANCER Paternal Grandfather      Breast Cancer Paternal Aunt         EXAM:Vitals: Temp 98.3  F (36.8  C) (Temporal)  Ht 5' 3.19\" (1.605 m)  Wt 122 lb (55.3 kg)  BMI 21.48 kg/m2  BMI= Body mass index is 21.48 kg/(m^2).    General appearance: Patient is alert and fully cooperative with history & exam.  No sign of distress is noted during the visit.     Psychiatric: Affect is pleasant & appropriate.  Patient appears motivated to improve health.     Respiratory: Breathing is regular & unlabored while sitting.     HEENT: Hearing is intact to spoken word.  Speech is clear.  No gross evidence of visual impairment that would impact ambulation.     Vascular: DP & PT pulses are intact & regular bilaterally.  No significant edema or varicosities noted.  CFT and skin temperature is normal to both lower extremities.     Neurologic: Lower extremity sensation is intact to light " touch.  No evidence of weakness or contracture in the lower extremities.  No evidence of neuropathy.    Dermatologic: Skin is intact to both lower extremities with normal texture, turgor and tone.  A nucleated hyperkeratotic mass is noted about the left foot at the base of the fourth toe. Pinpoint bleeding upon debridement and loss of skin lines noted consistent with verruca.    Musculoskeletal: Patient is ambulatory without assistive device or brace.  No gross ankle deformity noted.  No foot or ankle joint effusion is noted.      ASSESSMENT:       ICD-10-CM    1. Plantar verruca B07.0         PLAN:  Reviewed patient's chart in epic.  Discussed treatment options. Treatment options include multiple applications of freezing, cantharidin, salicylic acid with occlusive dressing, prescription antiviral topicals, or surgical excision or ablation.     After discussing these options and potential outcomes and post op care the patient wishes to pursue topical treatments.  I pared the lesion/s to a bleeding base and applied 3 applications of liquid nitrogen with history jet for 5-6 seconds each. Sterile dressing was applied.  Patient was instructed to exsanguinate any blister that forms and protect with a bandage.  As soon as tolerated the patient was instructed to apply over-the-counter topical liquid wart remover under occlusive dressing 24 hours per day 7 days per week as tolerated. Follow up with me in 3 weeks for debridement for comfort, reevaluation and further treatment if needed.  I explained this may take multiple applications to be of benefit. Topical treatments must remain consistent 24/7 until resolved, discontinuing treatment for a short period of time will eliminate efficacy. All questions were answered to their satisfaction. Written instructions were dispensed.  We also discussed alternative treatment options including surgical excision however this will require nonweightbearing for as much as one  month.      Nacho Lema DPM      Again, thank you for allowing me to participate in the care of your patient.        Sincerely,        Nacho Lema DPM

## 2017-10-24 NOTE — TELEPHONE ENCOUNTER
Please see message below.   Patient states she is at work and can't leave early, she will try to be here as early as she can otherwise it will be close to 345.   Thank you,  Love.

## 2017-10-24 NOTE — NURSING NOTE
"Chief Complaint   Patient presents with     Consult     callus plantar Left 4th; new pt       Initial Temp 98.3  F (36.8  C) (Temporal)  Ht 5' 3.19\" (1.605 m)  Wt 122 lb (55.3 kg)  BMI 21.48 kg/m2 Estimated body mass index is 21.48 kg/(m^2) as calculated from the following:    Height as of this encounter: 5' 3.19\" (1.605 m).    Weight as of this encounter: 122 lb (55.3 kg).  BP completed using cuff size: NA (Not Taken)  Medication Reconciliation: complete    Colleen Nowak CMA, October 24, 2017    "

## 2017-10-24 NOTE — MR AVS SNAPSHOT
After Visit Summary   10/24/2017    Santa Kinsey    MRN: [id number]           Patient Information     Date Of Birth          1975        Visit Information        Provider Department      10/24/2017 3:45 PM Nacho Lema [facility] Instructions    Wart treatment    1.  After bathing or soaking for 5 minutes, remove any loose or dead skin with a fingernail clipper, pumice stone, emery board, or 120 grit sandpaper.      2.  Place one drop of salicylic acid liquid or a patch on each wart. Try to apply this medication about 2 mm beyond the lesion and try not to get the medicine on the surrounding skin.     3.  Cover the treated area with strong tape to keep the medicine in place. You can use athletic tape, a Band-Aid, or duct tape.  Keep this in place all day.  If you skip a couple days, you will lose the benefit of the previous several days of treatment.     4.  Every few days remove the dead skin.  Reapply the salicylic acid each day, keeping it on place all day.     5.  If you have severe or intolerable skin irritation, skip a day between treatments. Then start treatment again. However, skipping treatments will slow down how quickly warts will go away.    6.  To permanently get rid of plantar warts will require 3-12 weeks of constant treatment and irritation.      7.  To move treatment along faster, but also more painful, you may apply over the counter liquid nitrogen to the wart once weekly and do not apply salicylic acid that day, then resume as above.  If a blister forms 1-2 days after the liquid nitrogen application, pop the blister and apply a bandaid. Then reapply the salicylic acid the next day.     8.  Follow up as instructed.  If you develop complications, you must be seen in clinic to evaluate and treat the complication.              Follow-ups after your visit        Who to contact     If you have questions or need follow up information about  "today's clinic visit or your schedule please contact [facility] directly at [phone].  Normal or non-critical lab and imaging results will be communicated to you by MyChart, letter or phone within 4 business days after the clinic has received the results. If you do not hear from us within 7 days, please contact the clinic through [employer] or phone. If you have a critical or abnormal lab result, we will notify you by phone as soon as possible.  Submit refill requests through [employer] or call your pharmacy and they will forward the refill request to us. Please allow 3 business days for your refill to be completed.          Additional Information About Your Visit        [employer] [employer] Information     [employer] gives you secure access to your electronic health record. If you see a primary care provider, you can also send messages to your care team and make appointments. If you have questions, please call your primary care clinic.  If you do not have a primary care provider, please call [phone] and they will assist you.        Care EveryWhere ID     This is your Care EveryWhere ID. This could be used by other organizations to access your [place] medical records  [id number]        Your Vitals Were     Temperature Height BMI (Body Mass Index)             98.3  F (36.8  C) (Temporal) 5' 3.19\" (1.605 m) 21.48 kg/m2          Blood Pressure from Last 3 Encounters:   09/25/17 109/62   05/11/17 122/74   03/28/17 94/62    Weight from Last 3 Encounters:   10/24/17 122 lb (55.3 kg)   05/11/17 114 lb (51.7 kg)   04/21/17 113 lb (51.3 kg)              Today, you had the following     No orders found for display       Primary Care Provider Office Phone # Fax #    Brandy Starr [phone]       [address] [facility] [address]  [facility] [address]        Equal Access to Services     MARY DOWLING AH: Apple Jaquez, brtitney russo, janice lange " lasvetlana corrales. So [facility] [phone].    ATENCIÓN: Si habla eliane, tiene a contreras disposición servicios gratuitos de asistencia lingüística. Sinan al [phone].    We comply with applicable federal civil rights laws and [place] laws. We do not discriminate on the basis of race, color, national origin, age, disability, sex, sexual orientation, or gender identity.            Thank you!     Thank you for choosing [facility]  for your care. Our goal is always to provide you with excellent care. Hearing back from our patients is one way we can continue to improve our services. Please take a few minutes to complete the written survey that you may receive in the mail after your visit with us. Thank you!             Your Updated Medication List - Protect others around you: Learn how to safely use, store and throw away your medicines at www.disposemymeds.org.          This list is accurate as of: 10/24/17  4:15 PM.  Always use your most recent med list.                   Brand Name Dispense Instructions for use Diagnosis    hydrocortisone 2.5 % cream    ANUSOL-HC    28.35 g    Place rectally 2 times daily    External hemorrhoids       hydrocortisone 2.5 % cream     60 g    Apply thin layer to affected areas twice daily as needed for itch    Chigger       hydrOXYzine 25 MG tablet    ATARAX    30 tablet    Take 1-2 tablets (25-50 mg) by mouth every 6 hours as needed for itching    Chigger

## 2017-11-14 ENCOUNTER — OFFICE VISIT (OUTPATIENT)
Dept: PODIATRY | Facility: OTHER | Age: 42
End: 2017-11-14
Payer: COMMERCIAL

## 2017-11-14 VITALS — BODY MASS INDEX: 21.26 KG/M2 | TEMPERATURE: 98 F | HEIGHT: 63 IN | WEIGHT: 120 LBS

## 2017-11-14 DIAGNOSIS — B07.0 PLANTAR VERRUCA: Primary | ICD-10-CM

## 2017-11-14 PROCEDURE — 17110 DESTRUCTION B9 LES UP TO 14: CPT | Performed by: PODIATRIST

## 2017-11-14 ASSESSMENT — PAIN SCALES - GENERAL: PAINLEVEL: NO PAIN (0)

## 2017-11-14 NOTE — Clinical Note
11/14/2017         RE: Santa Kinsey  [address] [facility] [facility] [address]        Dear Colleague,    Thank you for referring your patient, Santa Kinsey, to the [facility]. Please see a copy of my visit note below.    Chief Complaint   Patient presents with     RECHECK     improvement w/at home wart treatment - Left plantar wart; LOV 10/24/2017     BMI is normal.    HPI:  Santa Kinsey is a 42 year old female who is seen in consultation at the request of Julia Osborn CNP.    Pt presents for eval of:   (Onset, Location, L/R, Character, Treatments, Injury if yes)     Ongoing for about a year, callus/wart plantar Left foot base of 4th toe.  Sharp, stabbing, throbbing, dull ache, pain 7 w/pressure  1 liquid nitrogen clinic treatment, OTC wart treatments    Works as a [employer]. 40 hour work weeks on feet 1/2 of day    Review of Systems:  Patient denies fever, chills, rash, wound, stiffness, limping, numbness, weakness, heart burn, blood in stool, chest pain with activity, calf pain when walking, shortness of breath with activity, chronic cough, easy bleeding/bruising, swelling of ankles, excessive thirst, fatigue, depression, anxiety.       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     Other, mixed, or unspecified nondependent drug abuse, unspecified     Drug /ETOH abuse (non-depend.)        PAST SURGICAL HISTORY:   Past Surgical History:   Procedure Laterality Date     C APPENDECTOMY          MEDICATIONS:   Current Outpatient Prescriptions:      hydrOXYzine (ATARAX) 25 MG tablet, Take 1-2 tablets (25-50 mg) by mouth every 6 hours as needed for itching, Disp: 30 tablet, Rfl: 0     hydrocortisone 2.5 % cream, Apply thin layer to affected areas twice daily as needed for itch, Disp: 60 g, Rfl: 0     hydrocortisone (ANUSOL-HC) 2.5 % cream, Place  "rectally 2 times daily (Patient not taking: Reported on 9/25/2017), Disp: 28.35 g, Rfl: 0     ALLERGIES:    Allergies   Allergen Reactions     No Known Drug Allergies         SOCIAL HISTORY:   Social History     Social History     Marital status:      Spouse name: N/A     Number of children: N/A     Years of education: N/A     Occupational History     Not on file.     Social History Main Topics     Smoking status: Current Every Day Smoker     Packs/day: 0.75     Years: 8.00     Smokeless tobacco: Never Used     Alcohol use No     Drug use: No     Sexual activity: Not Currently     Partners: Male     Birth control/ protection: Surgical     Other Topics Concern      Service No     Blood Transfusions No     Caffeine Concern No     Occupational Exposure No     Hobby Hazards No     Sleep Concern No     Stress Concern Yes     Work, family.     Weight Concern No     Special Diet No     Back Care No     Exercise Yes     Bike Helmet Yes     Seat Belt Yes     Self-Exams No     hand-out of sbe given to patient     Parent/Sibling W/ Cabg, Mi Or Angioplasty Before 65f 55m? No     Social History Narrative        FAMILY HISTORY:   Family History   Problem Relation Age of Onset     CANCER Paternal Grandfather      Breast Cancer Paternal Aunt         EXAM:Vitals: Temp 98  F (36.7  C) (Temporal)  Ht 5' 3.19\" (1.605 m)  Wt 120 lb (54.4 kg)  BMI 21.13 kg/m2  BMI= Body mass index is 21.13 kg/(m^2).    General appearance: Patient is alert and fully cooperative with history & exam.  No sign of distress is noted during the visit.     Psychiatric: Affect is pleasant & appropriate.  Patient appears motivated to improve health.     Respiratory: Breathing is regular & unlabored while sitting.     HEENT: Hearing is intact to spoken word.  Speech is clear.  No gross evidence of visual impairment that would impact ambulation.     Vascular: DP & PT pulses are intact & regular bilaterally.  No significant edema or varicosities " noted.  CFT and skin temperature is normal to both lower extremities.     Neurologic: Lower extremity sensation is intact to light touch.  No evidence of weakness or contracture in the lower extremities.  No evidence of neuropathy.    Dermatologic: Skin is intact to both lower extremities with normal texture, turgor and tone.  A nucleated hyperkeratotic mass is noted about the left foot at the base of the fourth toe. Pinpoint bleeding upon debridement and loss of skin lines noted consistent with verruca.    Musculoskeletal: Patient is ambulatory without assistive device or brace.  No gross ankle deformity noted.  No foot or ankle joint effusion is noted.      ASSESSMENT:       ICD-10-CM    1. Plantar verruca B07.0         PLAN:  Reviewed patient's chart in epic.  Discussed treatment options. Treatment options include multiple applications of freezing, cantharidin, salicylic acid with occlusive dressing, prescription antiviral topicals, or surgical excision or ablation.     After discussing these options and potential outcomes and post op care the patient wishes to pursue topical treatments.  I pared the lesion/s to a bleeding base and applied 3 applications of liquid nitrogen with history jet for 5-6 seconds each. Sterile dressing was applied.  Patient was instructed to exsanguinate any blister that forms and protect with a bandage.  As soon as tolerated the patient was instructed to apply over-the-counter topical liquid wart remover under occlusive dressing 24 hours per day 7 days per week as tolerated. Follow up with me in 3 weeks for debridement for comfort, reevaluation and further treatment if needed.  I explained this may take multiple applications to be of benefit. Topical treatments must remain consistent 24/7 until resolved, discontinuing treatment for a short period of time will eliminate efficacy. All questions were answered to their satisfaction. Written instructions were dispensed.  We also discussed  alternative treatment options including surgical excision however this will require nonweightbearing for as much as one month.    11/14/2017  I debrided the lesion sharply about the base of the third fourth toe and reapplied 3 applications of liquid nitrogen 46 seconds each. Sterile dressing was applied. She was instructed to continue at-home OTC liquid wart remover under occlusive dressing. Follow-up in 2 weeks if not resolved.    Nacho Lema DPM        Again, thank you for allowing me to participate in the care of your patient.        Sincerely,        Nacho Lema DPM

## 2017-11-14 NOTE — MR AVS SNAPSHOT
"              After Visit Summary   11/14/2017    Santa Kinsey    MRN: [id number]           Patient Information     Date Of Birth          1975        Visit Information        Provider Department      11/14/2017 3:45 PM Nacho Lema DPM [facility]        Today's Diagnoses     Plantar verruca    -  1      Care Instructions    Follow-up in 3 weeks with any continued symptoms          Follow-ups after your visit        Who to contact     If you have questions or need follow up information about today's clinic visit or your schedule please contact [facility] directly at [phone].  Normal or non-critical lab and imaging results will be communicated to you by [email or web address], letter or phone within 4 business days after the clinic has received the results. If you do not hear from us within 7 days, please contact the clinic through [email or web address] or phone. If you have a critical or abnormal lab result, we will notify you by phone as soon as possible.  Submit refill requests through [employer] or call your pharmacy and they will forward the refill request to us. Please allow 3 business days for your refill to be completed.          Additional Information About Your Visit        MyChart Information     [employer] gives you secure access to your electronic health record. If you see a primary care provider, you can also send messages to your care team and make appointments. If you have questions, please call your primary care clinic.  If you do not have a primary care provider, please call [phone] and they will assist you.        Care EveryWhere ID     This is your Care EveryWhere ID. This could be used by other organizations to access your [place] medical records  [id number]        Your Vitals Were     Temperature Height BMI (Body Mass Index)             98  F (36.7  C) (Temporal) 5' 3.19\" (1.605 m) 21.13 kg/m2          Blood Pressure from Last 3 Encounters:   09/25/17 109/62   05/11/17 " 122/74   03/28/17 94/62    Weight from Last 3 Encounters:   11/14/17 120 lb (54.4 kg)   10/24/17 122 lb (55.3 kg)   05/11/17 114 lb (51.7 kg)              We Performed the Following     DESTRUCT BENIGN LESION, UP TO 14        Primary Care Provider Office Phone # Fax #    Brandy Starr [phone]       [address] [facility] [address]  [facility] [address]        Equal Access to Services     RUBÉN DOWLING : Hadii grant ku hadasho Soomaali, waaxda luqadaha, qaybta kaalmada adeegyada, waxay dayday noel . So [facility] [phone].    ATENCIÓN: Si habla español, tiene a contreras disposición servicios gratuitos de asistencia lingüística. [facility] [phone].    We comply with applicable federal civil rights laws and [place] laws. We do not discriminate on the basis of race, color, national origin, age, disability, sex, sexual orientation, or gender identity.            Thank you!     Thank you for choosing [facility]  for your care. Our goal is always to provide you with excellent care. Hearing back from our patients is one way we can continue to improve our services. Please take a few minutes to complete the written survey that you may receive in the mail after your visit with us. Thank you!             Your Updated Medication List - Protect others around you: Learn how to safely use, store and throw away your medicines at www.disposemymeds.org.          This list is accurate as of: 11/14/17  4:14 PM.  Always use your most recent med list.                   Brand Name Dispense Instructions for use Diagnosis    hydrocortisone 2.5 % cream    ANUSOL-HC    28.35 g    Place rectally 2 times daily    External hemorrhoids       hydrocortisone 2.5 % cream     60 g    Apply thin layer to affected areas twice daily as needed for itch    Chigger       hydrOXYzine 25 MG tablet    ATARAX    30 tablet    Take 1-2 tablets (25-50 mg) by mouth every 6 hours as needed for itching    Chigger

## 2017-11-14 NOTE — PROGRESS NOTES
Chief Complaint   Patient presents with     RECHECK     improvement w/at home wart treatment - Left plantar wart; LOV 10/24/2017     BMI is normal.    HPI:  Santa Kinsey is a 42 year old female who is seen in consultation at the request of Julia Osborn CNP.    Pt presents for eval of:   (Onset, Location, L/R, Character, Treatments, Injury if yes)     Ongoing for about a year, callus/wart plantar Left foot base of 4th toe.  Sharp, stabbing, throbbing, dull ache, pain 7 w/pressure  1 liquid nitrogen clinic treatment, OTC wart treatments    Works as a [employer]. 40 hour work weeks on feet 1/2 of day    Review of Systems:  Patient denies fever, chills, rash, wound, stiffness, limping, numbness, weakness, heart burn, blood in stool, chest pain with activity, calf pain when walking, shortness of breath with activity, chronic cough, easy bleeding/bruising, swelling of ankles, excessive thirst, fatigue, depression, anxiety.       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     Other, mixed, or unspecified nondependent drug abuse, unspecified     Drug /ETOH abuse (non-depend.)        PAST SURGICAL HISTORY:   Past Surgical History:   Procedure Laterality Date     C APPENDECTOMY          MEDICATIONS:   Current Outpatient Prescriptions:      hydrOXYzine (ATARAX) 25 MG tablet, Take 1-2 tablets (25-50 mg) by mouth every 6 hours as needed for itching, Disp: 30 tablet, Rfl: 0     hydrocortisone 2.5 % cream, Apply thin layer to affected areas twice daily as needed for itch, Disp: 60 g, Rfl: 0     hydrocortisone (ANUSOL-HC) 2.5 % cream, Place rectally 2 times daily (Patient not taking: Reported on 9/25/2017), Disp: 28.35 g, Rfl: 0     ALLERGIES:    Allergies   Allergen Reactions     No Known Drug Allergies         SOCIAL HISTORY:   Social History     Social History     Marital  "status:      Spouse name: N/A     Number of children: N/A     Years of education: N/A     Occupational History     Not on file.     Social History Main Topics     Smoking status: Current Every Day Smoker     Packs/day: 0.75     Years: 8.00     Smokeless tobacco: Never Used     Alcohol use No     Drug use: No     Sexual activity: Not Currently     Partners: Male     Birth control/ protection: Surgical     Other Topics Concern      Service No     Blood Transfusions No     Caffeine Concern No     Occupational Exposure No     Hobby Hazards No     Sleep Concern No     Stress Concern Yes     Work, family.     Weight Concern No     Special Diet No     Back Care No     Exercise Yes     Bike Helmet Yes     Seat Belt Yes     Self-Exams No     hand-out of sbe given to patient     Parent/Sibling W/ Cabg, Mi Or Angioplasty Before 65f 55m? No     Social History Narrative        FAMILY HISTORY:   Family History   Problem Relation Age of Onset     CANCER Paternal Grandfather      Breast Cancer Paternal Aunt         EXAM:Vitals: Temp 98  F (36.7  C) (Temporal)  Ht 5' 3.19\" (1.605 m)  Wt 120 lb (54.4 kg)  BMI 21.13 kg/m2  BMI= Body mass index is 21.13 kg/(m^2).    General appearance: Patient is alert and fully cooperative with history & exam.  No sign of distress is noted during the visit.     Psychiatric: Affect is pleasant & appropriate.  Patient appears motivated to improve health.     Respiratory: Breathing is regular & unlabored while sitting.     HEENT: Hearing is intact to spoken word.  Speech is clear.  No gross evidence of visual impairment that would impact ambulation.     Vascular: DP & PT pulses are intact & regular bilaterally.  No significant edema or varicosities noted.  CFT and skin temperature is normal to both lower extremities.     Neurologic: Lower extremity sensation is intact to light touch.  No evidence of weakness or contracture in the lower extremities.  No evidence of " neuropathy.    Dermatologic: Skin is intact to both lower extremities with normal texture, turgor and tone.  A nucleated hyperkeratotic mass is noted about the left foot at the base of the fourth toe. Pinpoint bleeding upon debridement and loss of skin lines noted consistent with verruca.    Musculoskeletal: Patient is ambulatory without assistive device or brace.  No gross ankle deformity noted.  No foot or ankle joint effusion is noted.      ASSESSMENT:       ICD-10-CM    1. Plantar verruca B07.0         PLAN:  Reviewed patient's chart in epic.  Discussed treatment options. Treatment options include multiple applications of freezing, cantharidin, salicylic acid with occlusive dressing, prescription antiviral topicals, or surgical excision or ablation.     After discussing these options and potential outcomes and post op care the patient wishes to pursue topical treatments.  I pared the lesion/s to a bleeding base and applied 3 applications of liquid nitrogen with history jet for 5-6 seconds each. Sterile dressing was applied.  Patient was instructed to exsanguinate any blister that forms and protect with a bandage.  As soon as tolerated the patient was instructed to apply over-the-counter topical liquid wart remover under occlusive dressing 24 hours per day 7 days per week as tolerated. Follow up with me in 3 weeks for debridement for comfort, reevaluation and further treatment if needed.  I explained this may take multiple applications to be of benefit. Topical treatments must remain consistent 24/7 until resolved, discontinuing treatment for a short period of time will eliminate efficacy. All questions were answered to their satisfaction. Written instructions were dispensed.  We also discussed alternative treatment options including surgical excision however this will require nonweightbearing for as much as one month.    11/14/2017  I debrided the lesion sharply about the base of the third fourth toe and  reapplied 3 applications of liquid nitrogen 46 seconds each. Sterile dressing was applied. She was instructed to continue at-home OTC liquid wart remover under occlusive dressing. Follow-up in 2 weeks if not resolved.    Nacho Lema DPM

## 2018-02-01 ENCOUNTER — MYC MEDICAL ADVICE (OUTPATIENT)
Dept: FAMILY MEDICINE | Facility: OTHER | Age: 43
End: 2018-02-01

## 2018-02-02 NOTE — TELEPHONE ENCOUNTER
KV states too late in day to do a virtual visit to get urine results in time. Will route to RN's to see if pt is having active UTI infection.    Eva Levin, CMA

## 2018-02-13 NOTE — PROGRESS NOTES
SUBJECTIVE:   Santa Kinsey is a 42 year old female who presents to clinic today for the following health issues:    HPI     Vaginal Symptoms/Urinary Symptoms.   Onset: 3 weeks    Description:  Vaginal Discharge: white, yellow   Itching (Pruritis): no   Burning sensation:  YES  Odor: YES    Accompanying Signs & Symptoms:  Pain with Urination: no   Abdominal Pain: Bloating feeling no abdominal pain.   Fever: YES - today     History:   Sexually active: no   New Partner: no   Possibility of Pregnancy:  No    Precipitating factors:   Recent Antibiotic Use: YES- Amoxicillin     Alleviating factors:  None    Therapies Tried and outcome: Aleve - helps  Not sexually active  Amoxicillin recently for her tooth infection  Recurrent UTI's does not always come in due to cost. Last UTI was about 1 month ago. She reports that it happens urinary incontinence -jumping, working out cause her to leak.       Acute illness- URI, nasal congestion, cough, started yesterday.      Problem list and histories reviewed & adjusted, as indicated.  Additional history: as documented      Current Outpatient Prescriptions   Medication Sig Dispense Refill     amoxicillin (AMOXIL) 500 MG capsule TAKE ONE CAPSULE BY MOUTH THREE TIMES A DAY UNTIL GONE  0     hydrOXYzine (ATARAX) 25 MG tablet Take 1-2 tablets (25-50 mg) by mouth every 6 hours as needed for itching (Patient not taking: Reported on 2/19/2018) 30 tablet 0     hydrocortisone 2.5 % cream Apply thin layer to affected areas twice daily as needed for itch (Patient not taking: Reported on 2/19/2018) 60 g 0     hydrocortisone (ANUSOL-HC) 2.5 % cream Place rectally 2 times daily (Patient not taking: Reported on 9/25/2017) 28.35 g 0     BP Readings from Last 3 Encounters:   02/19/18 110/58   09/25/17 109/62   05/11/17 122/74    Wt Readings from Last 3 Encounters:   02/19/18 119 lb 9.6 oz (54.3 kg)   11/14/17 120 lb (54.4 kg)   10/24/17 122 lb (55.3 kg)                    ROS:  As noted above  "    OBJECTIVE:     /58  Pulse 84  Temp 99.5  F (37.5  C) (Temporal)  Resp 14  Ht 5' 3.19\" (1.605 m)  Wt 119 lb 9.6 oz (54.3 kg)  SpO2 99%  BMI 21.06 kg/m2  Body mass index is 21.06 kg/(m^2).  GENERAL: healthy, alert and no distress  EYES: Eyes grossly normal to inspection, PERRL and conjunctivae and sclerae normal  HENT: ear canals and TM's normal, nose and mouth without ulcers or lesions  RESP: lungs clear to auscultation - no rales, rhonchi or wheezes  CV: regular rate and rhythm, normal S1 S2, no S3 or S4, no murmur, click or rub, no peripheral edema and peripheral pulses strong  ABDOMEN: soft, nontender, no hepatosplenomegaly, no masses and bowel sounds normal  MS: no gross musculoskeletal defects noted, no edema  SKIN: no suspicious lesions or rashes  NEURO: Normal strength and tone, mentation intact and speech normal  PSYCH: mentation appears normal, affect normal/bright    Diagnostic Test Results:  Results for orders placed or performed in visit on 02/19/18   UA reflex to Microscopic and Culture   Result Value Ref Range    Color Urine Yellow     Appearance Urine Clear     Glucose Urine Negative NEG^Negative mg/dL    Bilirubin Urine Negative NEG^Negative    Ketones Urine Negative NEG^Negative mg/dL    Specific Gravity Urine 1.020 1.003 - 1.035    Blood Urine Negative NEG^Negative    pH Urine 7.5 (H) 5.0 - 7.0 pH    Protein Albumin Urine Trace (A) NEG^Negative mg/dL    Urobilinogen Urine 1.0 0.2 - 1.0 EU/dL    Nitrite Urine Negative NEG^Negative    Leukocyte Esterase Urine Small (A) NEG^Negative    Source Midstream Urine    Urine Microscopic   Result Value Ref Range    WBC Urine 2-5 (A) OTO2^O - 2 /HPF    RBC Urine O - 2 OTO2^O - 2 /HPF    Squamous Epithelial /LPF Urine Moderate (A) FEW^Few /LPF    Bacteria Urine Few (A) NEG^Negative /HPF   Wet prep   Result Value Ref Range    Specimen Description Vagina     Wet Prep No Trichomonas seen     Wet Prep No clue cells seen     Wet Prep Yeast seen (A)  "       ASSESSMENT/PLAN:           ICD-10-CM    1. Acute cystitis without hematuria N30.00 Wet prep     UA reflex to Microscopic and Culture     Wet prep     UA reflex to Microscopic and Culture     Urine Microscopic     ciprofloxacin (CIPRO) 250 MG tablet   2. Recurrent UTI N39.0 UROLOGY ADULT REFERRAL   3. Female stress incontinence N39.3 UROLOGY ADULT REFERRAL   4. Yeast infection of the vagina B37.3 fluconazole (DIFLUCAN) 150 MG tablet   5. Nausea R11.0 ondansetron (ZOFRAN) 4 MG tablet   6. Upper respiratory tract infection, unspecified type J06.9 ondansetron (ZOFRAN) 4 MG tablet     1. Will treat for UTI although lab and recommend following up with urology for recurrent UTI's. She reports that they are triggered by her stress incontinence. I advised her that there are physical therapy options for women who struggle with this which does include Kegel exercises along with various types of pelvic/hip exercises. She reports she would like to see urology to make sure there is nothing else going on, I respect this. I do not see a recurrence of hematuria, which is reassuring. She does not she has had several UTI's since she was last seen in the clinic last May for one. She reports she treats them herself OTC, I advised that this could be part of the problem that they are not properly being treated.   2. Will treat with vaginal yeast infection.   3. URI today, declined influenza testing. Advised on at home conservative treatment, if no improvements she needs to return to clinic. I did write her a work note for today and tomorrow.   4. Follow up as needed.     The patient indicates understanding of these issues and agrees with the plan.      KALA Dee [facility]

## 2018-02-19 ENCOUNTER — OFFICE VISIT (OUTPATIENT)
Dept: FAMILY MEDICINE | Facility: OTHER | Age: 43
End: 2018-02-19
Payer: COMMERCIAL

## 2018-02-19 VITALS
DIASTOLIC BLOOD PRESSURE: 58 MMHG | SYSTOLIC BLOOD PRESSURE: 110 MMHG | TEMPERATURE: 99.5 F | HEART RATE: 84 BPM | RESPIRATION RATE: 14 BRPM | WEIGHT: 119.6 LBS | BODY MASS INDEX: 21.19 KG/M2 | HEIGHT: 63 IN | OXYGEN SATURATION: 99 %

## 2018-02-19 DIAGNOSIS — J06.9 UPPER RESPIRATORY TRACT INFECTION, UNSPECIFIED TYPE: ICD-10-CM

## 2018-02-19 DIAGNOSIS — N39.3 FEMALE STRESS INCONTINENCE: ICD-10-CM

## 2018-02-19 DIAGNOSIS — B37.31 YEAST INFECTION OF THE VAGINA: ICD-10-CM

## 2018-02-19 DIAGNOSIS — N39.0 RECURRENT UTI: ICD-10-CM

## 2018-02-19 DIAGNOSIS — R11.0 NAUSEA: ICD-10-CM

## 2018-02-19 DIAGNOSIS — N30.00 ACUTE CYSTITIS WITHOUT HEMATURIA: Primary | ICD-10-CM

## 2018-02-19 LAB
ALBUMIN UR-MCNC: ABNORMAL MG/DL
APPEARANCE UR: CLEAR
BACTERIA #/AREA URNS HPF: ABNORMAL /HPF
BILIRUB UR QL STRIP: NEGATIVE
COLOR UR AUTO: YELLOW
GLUCOSE UR STRIP-MCNC: NEGATIVE MG/DL
HGB UR QL STRIP: NEGATIVE
KETONES UR STRIP-MCNC: NEGATIVE MG/DL
LEUKOCYTE ESTERASE UR QL STRIP: ABNORMAL
NITRATE UR QL: NEGATIVE
NON-SQ EPI CELLS #/AREA URNS LPF: ABNORMAL /LPF
PH UR STRIP: 7.5 PH (ref 5–7)
RBC #/AREA URNS AUTO: ABNORMAL /HPF
SOURCE: ABNORMAL
SP GR UR STRIP: 1.02 (ref 1–1.03)
SPECIMEN SOURCE: ABNORMAL
UROBILINOGEN UR STRIP-ACNC: 1 EU/DL (ref 0.2–1)
WBC #/AREA URNS AUTO: ABNORMAL /HPF
WET PREP SPEC: ABNORMAL

## 2018-02-19 PROCEDURE — 81001 URINALYSIS AUTO W/SCOPE: CPT | Performed by: NURSE PRACTITIONER

## 2018-02-19 PROCEDURE — 87210 SMEAR WET MOUNT SALINE/INK: CPT | Performed by: NURSE PRACTITIONER

## 2018-02-19 PROCEDURE — 99213 OFFICE O/P EST LOW 20 MIN: CPT | Performed by: NURSE PRACTITIONER

## 2018-02-19 RX ORDER — CIPROFLOXACIN 250 MG/1
250 TABLET, FILM COATED ORAL 2 TIMES DAILY
Qty: 14 TABLET | Refills: 0 | Status: SHIPPED | OUTPATIENT
Start: 2018-02-19 | End: 2018-04-06

## 2018-02-19 RX ORDER — ONDANSETRON 4 MG/1
4 TABLET, FILM COATED ORAL EVERY 8 HOURS PRN
Qty: 8 TABLET | Refills: 0 | Status: SHIPPED | OUTPATIENT
Start: 2018-02-19 | End: 2018-04-06

## 2018-02-19 RX ORDER — AMOXICILLIN 500 MG/1
CAPSULE ORAL
Refills: 0 | COMMUNITY
Start: 2018-02-09 | End: 2018-04-06

## 2018-02-19 RX ORDER — FLUCONAZOLE 150 MG/1
150 TABLET ORAL ONCE
Qty: 1 TABLET | Refills: 0 | Status: SHIPPED | OUTPATIENT
Start: 2018-02-19 | End: 2018-02-19

## 2018-02-19 ASSESSMENT — PAIN SCALES - GENERAL: PAINLEVEL: NO PAIN (0)

## 2018-02-19 NOTE — LETTER
[address]  [facility] [address]  Phone: [phone]    February 19, 2018        Santa Kinsey  [address] [facility] [address]          To whom it may concern:    RE: Santa Kinsey    Patient was seen and treated today at our clinic and missed work. Please excuse her from work 02/19/18- 02/20/2018.     Please contact me for questions or concerns.      Sincerely,        KALA Dee CNP

## 2018-02-19 NOTE — NURSING NOTE
"Chief Complaint   Patient presents with     Vaginal Problem     Panel Management     height, tdap, flu, lipid, dap, phq9       Initial /58  Pulse 84  Temp 99.5  F (37.5  C) (Temporal)  Resp 14  Ht 5' 3.19\" (1.605 m)  Wt 119 lb 9.6 oz (54.3 kg)  SpO2 99%  BMI 21.06 kg/m2 Estimated body mass index is 21.06 kg/(m^2) as calculated from the following:    Height as of this encounter: 5' 3.19\" (1.605 m).    Weight as of this encounter: 119 lb 9.6 oz (54.3 kg).  Medication Reconciliation: complete  "

## 2018-02-19 NOTE — MR AVS SNAPSHOT
After Visit Summary   2/19/2018    Santa Kinsey    MRN: [id number]           Patient Information     Date Of Birth          1975        Visit Information        Provider Department      2/19/2018 4:20 PM Julia Christensen APRN CNP [facility]        Today's Diagnoses     Acute cystitis without hematuria    -  1    Recurrent UTI        Female stress incontinence        Yeast infection of the vagina        Nausea        Upper respiratory tract infection, unspecified type          Care Instructions    -Start antibiotic today for urinary tract infection  - Take 1 dose of Diflucan for vaginal yeast infection  - Follow up with urology to discuss your stress incontinence, let me know if you would like to start physical therapy for this.   - Continue to use conservative treatment for your Upper respiratory infection- fluids, rest and tylenol as needed.   - Note for work for today and tomorrow for presumed influenza.     KALA Dee CNP            Follow-ups after your visit        Additional Services     UROLOGY ADULT REFERRAL       Your provider has referred you to: [facility]: [facility] [phone]   [email or web address]  [facility]: [facility] [phone]   [email or web address]  [facility]: [facility] [phone]   [email or web address]    Please be aware that coverage of these services is subject to the terms and limitations of your health insurance plan.  Call member services at your health plan with any benefit or coverage questions.      Please bring the following with you to your appointment:    (1) Any X-Rays, CTs or MRIs which have been performed.  Contact the facility where they were done to arrange for  prior to your scheduled appointment.   "  (2) List of current medications  (3) This referral request   (4) Any documents/labs given to you for this referral                  Follow-up notes from your care team     Return if symptoms worsen or fail to improve.      Who to contact     If you have questions or need follow up information about today's clinic visit or your schedule please contact [facility] directly at [phone].  Normal or non-critical lab and imaging results will be communicated to you by MyChart, letter or phone within 4 business days after the clinic has received the results. If you do not hear from us within 7 days, please contact the clinic through [employer] or phone. If you have a critical or abnormal lab result, we will notify you by phone as soon as possible.  Submit refill requests through [employer] or call your pharmacy and they will forward the refill request to us. Please allow 3 business days for your refill to be completed.          Additional Information About Your Visit        [employer] Information     [employer] gives you secure access to your electronic health record. If you see a primary care provider, you can also send messages to your care team and make appointments. If you have questions, please call your primary care clinic.  If you do not have a primary care provider, please call [phone] and they will assist you.        Care EveryWhere ID     This is your Care EveryWhere ID. This could be used by other organizations to access your [place] medical records  [id number]        Your Vitals Were     Pulse Temperature Respirations Height Pulse Oximetry BMI (Body Mass Index)    84 99.5  F (37.5  C) (Temporal) 14 5' 3.19\" (1.605 m) 99% 21.06 kg/m2       Blood Pressure from Last 3 Encounters:   02/19/18 110/58   09/25/17 109/62   05/11/17 122/74    Weight from Last 3 Encounters:   02/19/18 119 lb 9.6 oz (54.3 kg)   11/14/17 120 lb (54.4 kg)   10/24/17 122 lb (55.3 kg)              We Performed the Following     " DEPRESSION ACTION PLAN (DAP)     UA reflex to Microscopic and Culture     Urine Microscopic     UROLOGY ADULT REFERRAL     Wet prep          Today's Medication Changes          These changes are accurate as of 2/19/18  5:06 PM.  If you have any questions, ask your nurse or doctor.               Start taking these medicines.        Dose/Directions    ciprofloxacin 250 MG tablet   Commonly known as:  CIPRO   Used for:  Acute cystitis without hematuria   Started by:  Julia Christensen APRN CNP        Dose:  250 mg   Take 1 tablet (250 mg) by mouth 2 times daily   Quantity:  14 tablet   Refills:  0       fluconazole 150 MG tablet   Commonly known as:  DIFLUCAN   Used for:  Yeast infection of the vagina   Started by:  Julia Christensen APRN CNP        Dose:  150 mg   Take 1 tablet (150 mg) by mouth once for 1 dose   Quantity:  1 tablet   Refills:  0       ondansetron 4 MG tablet   Commonly known as:  ZOFRAN   Used for:  Nausea, Upper respiratory tract infection, unspecified type   Started by:  Julia Christensen APRN CNP        Dose:  4 mg   Take 1 tablet (4 mg) by mouth every 8 hours as needed for nausea   Quantity:  8 tablet   Refills:  0            Where to get your medicines      These medications were sent to [id number] - [place], [place] - [address]  [address] [facility] [address]    Hours:  [facility] [place] - numbers unchanged   9/8/03  Phone:  [phone]     ciprofloxacin 250 MG tablet    fluconazole 150 MG tablet    ondansetron 4 MG tablet                Primary Care Provider Office Phone # Fax #    Brandy Starr [phone]       [address] [place] [address]        Equal Access to Services     [facility] AH: Apple Jaquez, waaxda luqadaha, qaybta kaalmada janice vaca. So [facility] [phone].    ATENCIÓN: Si habla español, tiene a contreras disposición servicios gratuitos de asistencia lingüística. Llame al  [phone].    We comply with applicable federal civil rights laws and [place] laws. We do not discriminate on the basis of race, color, national origin, age, disability, sex, sexual orientation, or gender identity.            Thank you!     Thank you for choosing [facility]  for your care. Our goal is always to provide you with excellent care. Hearing back from our patients is one way we can continue to improve our services. Please take a few minutes to complete the written survey that you may receive in the mail after your visit with us. Thank you!             Your Updated Medication List - Protect others around you: Learn how to safely use, store and throw away your medicines at www.disposemymeds.org.          This list is accurate as of 2/19/18  5:06 PM.  Always use your most recent med list.                   Brand Name Dispense Instructions for use Diagnosis    amoxicillin 500 MG capsule    AMOXIL     TAKE ONE CAPSULE BY MOUTH THREE TIMES A DAY UNTIL GONE        ciprofloxacin 250 MG tablet    CIPRO    14 tablet    Take 1 tablet (250 mg) by mouth 2 times daily    Acute cystitis without hematuria       fluconazole 150 MG tablet    DIFLUCAN    1 tablet    Take 1 tablet (150 mg) by mouth once for 1 dose    Yeast infection of the vagina       ondansetron 4 MG tablet    ZOFRAN    8 tablet    Take 1 tablet (4 mg) by mouth every 8 hours as needed for nausea    Nausea, Upper respiratory tract infection, unspecified type

## 2018-02-19 NOTE — PATIENT INSTRUCTIONS
-Start antibiotic today for urinary tract infection  - Take 1 dose of Diflucan for vaginal yeast infection  - Follow up with urology to discuss your stress incontinence, let me know if you would like to start physical therapy for this.   - Continue to use conservative treatment for your Upper respiratory infection- fluids, rest and tylenol as needed.   - Note for work for today and tomorrow for presumed influenza.     KALA Dee CNP

## 2018-02-19 NOTE — LETTER
My Depression Action Plan  Name: Santa Kinsey   Date of Birth 1975  Date: 2/13/2018    My doctor: Brandy Starr   My clinic: [address]  [facility] [address] [phone]          GREEN    ZONE   Good Control    What it looks like:     Things are going generally well. You have normal up s and down s. You may even feel depressed from time to time, but bad moods usually last less than a day.   What you need to do:  1. Continue to care for yourself (see self care plan)  2. Check your depression survival kit and update it as needed  3. Follow your physician s recommendations including any medication.  4. Do not stop taking medication unless you consult with your physician first.           YELLOW         ZONE Getting Worse    What it looks like:     Depression is starting to interfere with your life.     It may be hard to get out of bed; you may be starting to isolate yourself from others.    Symptoms of depression are starting to last most all day and this has happened for several days.     You may have suicidal thoughts but they are not constant.   What you need to do:     1. Call your care team, your response to treatment will improve if you keep your care team informed of your progress. Yellow periods are signs an adjustment may need to be made.     2. Continue your self-care, even if you have to fake it!    3. Talk to someone in your support network    4. Open up your depression survival kit           RED    ZONE Medical Alert - Get Help    What it looks like:     Depression is seriously interfering with your life.     You may experience these or other symptoms: You can t get out of bed most days, can t work or engage in other necessary activities, you have trouble taking care of basic hygiene, or basic responsibilities, thoughts of suicide or death that will not go away, self-injurious behavior.     What you need to do:  1. Call your care team and  request a same-day appointment. If they are not available (weekends or after hours) call your local crisis line, emergency room or 911.      Electronically signed by: Sandra Nava, February 13, 2018    Depression Self Care Plan / Survival Kit    Self-Care for Depression  Here s the deal. Your body and mind are really not as separate as most people think.  What you do and think affects how you feel and how you feel influences what you do and think. This means if you do things that people who feel good do, it will help you feel better.  Sometimes this is all it takes.  There is also a place for medication and therapy depending on how severe your depression is, so be sure to consult with your medical provider and/ or Behavioral Health Consultant if your symptoms are worsening or not improving.     In order to better manage my stress, I will:    Exercise  Get some form of exercise, every day. This will help reduce pain and release endorphins, the  feel good  chemicals in your brain. This is almost as good as taking antidepressants!  This is not the same as joining a gym and then never going! (they count on that by the way ) It can be as simple as just going for a walk or doing some gardening, anything that will get you moving.      Hygiene   Maintain good hygiene (Get out of bed in the morning, Make your bed, Brush your teeth, Take a shower, and Get dressed like you were going to work, even if you are unemployed).  If your clothes don't fit try to get ones that do.    Diet  I will strive to eat foods that are good for me, drink plenty of water, and avoid excessive sugar, caffeine, alcohol, and other mood-altering substances.  Some foods that are helpful in depression are: complex carbohydrates, B vitamins, flaxseed, fish or fish oil, fresh fruits and vegetables.    Psychotherapy  I agree to participate in Individual Therapy (if recommended).    Medication  If prescribed medications, I agree to take them.  Missing  doses can result in serious side effects.  I understand that drinking alcohol, or other illicit drug use, may cause potential side effects.  I will not stop my medication abruptly without first discussing it with my provider.    Staying Connected With Others  I will stay in touch with my friends, family members, and my primary care provider/team.    Use your imagination  Be creative.  We all have a creative side; it doesn t matter if it s oil painting, sand castles, or mud pies! This will also kick up the endorphins.    Witness Beauty  (AKA stop and smell the roses) Take a look outside, even in mid-winter. Notice colors, textures. Watch the squirrels and birds.     Service to others  Be of service to others.  There is always someone else in need.  By helping others we can  get out of ourselves  and remember the really important things.  This also provides opportunities for practicing all the other parts of the program.    Humor  Laugh and be silly!  Adjust your TV habits for less news and crime-drama and more comedy.    Control your stress  Try breathing deep, massage therapy, biofeedback, and meditation. Find time to relax each day.     My support system    Clinic Contact:  Phone number:    Contact 1:  Phone number:    Contact 2:  Phone number:    [employer]/:  Phone number:    Therapist:  Phone number:    Local crisis center:    Phone number:    Other community support:  Phone number:

## 2018-03-06 ENCOUNTER — E-VISIT (OUTPATIENT)
Dept: FAMILY MEDICINE | Facility: OTHER | Age: 43
End: 2018-03-06
Payer: COMMERCIAL

## 2018-03-06 DIAGNOSIS — B37.31 YEAST INFECTION OF THE VAGINA: ICD-10-CM

## 2018-03-06 DIAGNOSIS — R30.0 DYSURIA: Primary | ICD-10-CM

## 2018-03-06 NOTE — MR AVS SNAPSHOT
After Visit Summary   3/6/2018    Santa Kinsey    MRN: [id number]           Patient Information     Date Of Birth          1975        Visit Information        Provider Department      3/6/2018 9:47 AM Julia Christensen APRN CNP [facility]        Today's Diagnoses     Dysuria    -  1    Yeast infection of the vagina           Follow-ups after your visit        Your next 10 appointments already scheduled     Mar 22, 2018  8:15 AM CDT   New Visit with Feliciano Cortes MD   [facility] ([facility])    [address]  [facility] [address]   [phone]              Future tests that were ordered for you today     Open Future Orders        Priority Expected Expires Ordered    Wet prep Routine 3/7/2018 4/23/2018 3/7/2018    *UA reflex to Microscopic and Culture ([place] and [facility] (except [place] and [place]) Routine 3/7/2018 3/7/2019 3/7/2018            Who to contact     If you have questions or need follow up information about today's clinic visit or your schedule please contact [facility] directly at [phone].  Normal or non-critical lab and imaging results will be communicated to you by Issuehart, letter or phone within 4 business days after the clinic has received the results. If you do not hear from us within 7 days, please contact the clinic through Issuehart or phone. If you have a critical or abnormal lab result, we will notify you by phone as soon as possible.  Submit refill requests through [email or web address] or call your pharmacy and they will forward the refill request to us. Please allow 3 business days for your refill to be completed.          Additional Information About Your Visit        MyChart Information     [email or web address] gives you secure access to your electronic health record. If you see a primary care provider, you can also send messages to your care team and make appointments. If you have questions, please call  your primary care clinic.  If you do not have a primary care provider, please call [phone] and they will assist you.        Care EveryWhere ID     This is your Care EveryWhere ID. This could be used by other organizations to access your [place] medical records  [id number]         Blood Pressure from Last 3 Encounters:   02/19/18 110/58   09/25/17 109/62   05/11/17 122/74    Weight from Last 3 Encounters:   02/19/18 119 lb 9.6 oz (54.3 kg)   11/14/17 120 lb (54.4 kg)   10/24/17 122 lb (55.3 kg)                 Today's Medication Changes          These changes are accurate as of 3/6/18 11:59 PM.  If you have any questions, ask your nurse or doctor.               Start taking these medicines.        Dose/Directions    fluconazole 150 MG tablet   Commonly known as:  DIFLUCAN   Used for:  Yeast infection of the vagina        Dose:  150 mg   Take 1 tablet (150 mg) by mouth once for 1 dose Can repeat in 3 days if symptoms still present   Quantity:  3 tablet   Refills:  0            Where to get your medicines      These medications were sent to [id number] - [place], [place] - [address] [facility]  [address] [facility] [address]    Hours:  Formerly [place] - numbers unchanged   9/8/03  Phone:  [phone]     fluconazole 150 MG tablet                Primary Care Provider Office Phone # Fax #    Brandy Starr [phone]       [address]  [facility] [address]        Equal Access to Services     RUBÉN DOWLING AH: Hadii grant currano Soyony, waaxda luqadaha, qaybta kaalmajanice paige. So [facility] [phone].    ATENCIÓN: Si habla español, tiene a contreras disposición servicios gratuitos de asistencia lingüística. Llame al [phone].    We comply with applicable federal civil rights laws and [place] laws. We do not discriminate on the basis of race, color, national origin, age, disability, sex, sexual orientation, or gender identity.            Thank  you!     Thank you for choosing [facility] [place]  for your care. Our goal is always to provide you with excellent care. Hearing back from our patients is one way we can continue to improve our services. Please take a few minutes to complete the written survey that you may receive in the mail after your visit with us. Thank you!             Your Updated Medication List - Protect others around you: Learn how to safely use, store and throw away your medicines at www.disposemymeds.org.          This list is accurate as of 3/6/18 11:59 PM.  Always use your most recent med list.                   Brand Name Dispense Instructions for use Diagnosis    amoxicillin 500 MG capsule    AMOXIL     TAKE ONE CAPSULE BY MOUTH THREE TIMES A DAY UNTIL GONE        ciprofloxacin 250 MG tablet    CIPRO    14 tablet    Take 1 tablet (250 mg) by mouth 2 times daily    Acute cystitis without hematuria       fluconazole 150 MG tablet    DIFLUCAN    3 tablet    Take 1 tablet (150 mg) by mouth once for 1 dose Can repeat in 3 days if symptoms still present    Yeast infection of the vagina       ondansetron 4 MG tablet    ZOFRAN    8 tablet    Take 1 tablet (4 mg) by mouth every 8 hours as needed for nausea    Nausea, Upper respiratory tract infection, unspecified type

## 2018-03-07 RX ORDER — FLUCONAZOLE 150 MG/1
150 TABLET ORAL ONCE
Qty: 3 TABLET | Refills: 0 | Status: SHIPPED | OUTPATIENT
Start: 2018-03-07 | End: 2018-03-07

## 2018-03-08 DIAGNOSIS — B37.31 YEAST INFECTION OF THE VAGINA: ICD-10-CM

## 2018-03-08 DIAGNOSIS — R30.0 DYSURIA: ICD-10-CM

## 2018-03-08 LAB
ALBUMIN UR-MCNC: NEGATIVE MG/DL
APPEARANCE UR: CLEAR
BILIRUB UR QL STRIP: NEGATIVE
COLOR UR AUTO: YELLOW
GLUCOSE UR STRIP-MCNC: NEGATIVE MG/DL
HGB UR QL STRIP: NEGATIVE
KETONES UR STRIP-MCNC: NEGATIVE MG/DL
LEUKOCYTE ESTERASE UR QL STRIP: NEGATIVE
NITRATE UR QL: NEGATIVE
PH UR STRIP: 6 PH (ref 5–7)
SOURCE: NORMAL
SP GR UR STRIP: 1.01 (ref 1–1.03)
SPECIMEN SOURCE: NORMAL
UROBILINOGEN UR STRIP-ACNC: 0.2 EU/DL (ref 0.2–1)
WET PREP SPEC: NORMAL

## 2018-03-08 PROCEDURE — 87210 SMEAR WET MOUNT SALINE/INK: CPT | Performed by: PHYSICIAN ASSISTANT

## 2018-03-08 PROCEDURE — 81003 URINALYSIS AUTO W/O SCOPE: CPT | Performed by: PHYSICIAN ASSISTANT

## 2018-03-26 ENCOUNTER — OFFICE VISIT (OUTPATIENT)
Dept: FAMILY MEDICINE | Facility: OTHER | Age: 43
End: 2018-03-26
Payer: COMMERCIAL

## 2018-03-26 ENCOUNTER — RADIANT APPOINTMENT (OUTPATIENT)
Dept: GENERAL RADIOLOGY | Facility: OTHER | Age: 43
End: 2018-03-26
Attending: NURSE PRACTITIONER
Payer: COMMERCIAL

## 2018-03-26 VITALS
BODY MASS INDEX: 21.48 KG/M2 | DIASTOLIC BLOOD PRESSURE: 60 MMHG | WEIGHT: 122 LBS | SYSTOLIC BLOOD PRESSURE: 106 MMHG | HEART RATE: 78 BPM | OXYGEN SATURATION: 100 % | RESPIRATION RATE: 16 BRPM | TEMPERATURE: 98.5 F

## 2018-03-26 DIAGNOSIS — S69.92XA WRIST INJURY, LEFT, INITIAL ENCOUNTER: ICD-10-CM
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
## 2018-03-26 PROCEDURE — 73130 X-RAY EXAM OF HAND: CPT | Mod: LT

## 2018-03-26 PROCEDURE — 99213 OFFICE O/P EST LOW 20 MIN: CPT | Performed by: NURSE PRACTITIONER

## 2018-03-26 PROCEDURE — 73090 X-RAY EXAM OF FOREARM: CPT | Mod: LT

## 2018-03-26 ASSESSMENT — PAIN SCALES - GENERAL: PAINLEVEL: MILD PAIN (2)
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
## 2018-03-26 NOTE — PATIENT INSTRUCTIONS
Hand and Wrist Exercises: Forearm Roll  This exercise is designed to stretch and strengthen your hands and wrists. Before beginning, read through all the instructions. While exercising, breathe normally. If you feel any pain, stop the exercise. If pain persists, inform your healthcare provider.      Grasp a hammer or hand weight in your __left_ hand. Place your wrist, palm down, over the end of your knee or on the edge of a table.    Keeping your forearm against your thigh or a table, rotate your hand until your palm is up. Hold for ___20-30__ seconds. Then return to starting position.    Repeat _3___ times. Do __2___ sets a day.  Date Last Reviewed: 8/16/2015 2000-2017 [address]. [address] [place] [address]. All rights reserved. This information is not intended as a substitute for professional medical care. Always follow your healthcare professional's instructions.        Hand and Wrist Exercises: Wrist Flexion    This exercise is designed to stretch your hands and wrists. Before beginning, read through all the instructions. While exercising, breathe normally. If you feel any pain, stop the exercise. If pain persists, inform your healthcare provider.    Hold your _left____ hand in front of you with your palm down and elbow bent.    Grasp the back of that hand with your other hand. Pull back so your fingers point down as you straighten your arm. Feel a stretch in your forearm and wrist. Hold for 20-30_____ seconds. Then relax.    Repeat __3__ times. Do ____2_ sets a day.  Date Last Reviewed: 8/16/2015 2000-2017 [address] [place] [address]. All rights reserved. This information is not intended as a substitute for professional medical care. Always follow your healthcare professional's instructions.        Hand and Wrist Exercises: Finger  and Release      This exercise is designed to stretch and strengthen your hands and wrists. Before  beginning, read through all the instructions. While exercising, breathe normally. If you feel any pain, stop the exercise. If pain persists, inform your healthcare provider.    With your _left____ hand, make a tight fist. (Or you can grasp a sponge or ball.) Hold for ____20-30_ seconds. Then relax.    Spread your fingers as far apart as possible. Hold for _____ seconds. Then relax.    Repeat  3___ times. Do __2___ sets a day.  Date Last Reviewed: 9/9/2015 2000-2017 [employer]. [address] [place] [address], [place] [address]. All rights reserved. This information is not intended as a substitute for professional medical care. Always follow your healthcare professional's instructions.

## 2018-03-27 ASSESSMENT — PATIENT HEALTH QUESTIONNAIRE - PHQ9: SUM OF ALL RESPONSES TO PHQ QUESTIONS 1-9: 6

## 2018-04-06 ENCOUNTER — OFFICE VISIT (OUTPATIENT)
Dept: UROLOGY | Facility: CLINIC | Age: 43
End: 2018-04-06
Payer: COMMERCIAL

## 2018-04-06 VITALS — SYSTOLIC BLOOD PRESSURE: 102 MMHG | DIASTOLIC BLOOD PRESSURE: 58 MMHG | OXYGEN SATURATION: 100 % | HEART RATE: 76 BPM

## 2018-04-06 DIAGNOSIS — B37.31 YEAST VAGINITIS: Primary | ICD-10-CM

## 2018-04-06 DIAGNOSIS — N39.3 FEMALE STRESS INCONTINENCE: ICD-10-CM

## 2018-04-06 LAB
ALBUMIN UR-MCNC: NEGATIVE MG/DL
APPEARANCE UR: CLEAR
BILIRUB UR QL STRIP: NEGATIVE
COLOR UR AUTO: YELLOW
GLUCOSE UR STRIP-MCNC: NEGATIVE MG/DL
HGB UR QL STRIP: ABNORMAL
KETONES UR STRIP-MCNC: NEGATIVE MG/DL
LEUKOCYTE ESTERASE UR QL STRIP: NEGATIVE
NITRATE UR QL: NEGATIVE
NON-SQ EPI CELLS #/AREA URNS LPF: ABNORMAL /LPF
PH UR STRIP: 6 PH (ref 5–7)
RBC #/AREA URNS AUTO: ABNORMAL /HPF
SOURCE: ABNORMAL
SP GR UR STRIP: 1.01 (ref 1–1.03)
UROBILINOGEN UR STRIP-ACNC: 0.2 EU/DL (ref 0.2–1)
WBC #/AREA URNS AUTO: ABNORMAL /HPF

## 2018-04-06 PROCEDURE — 99203 OFFICE O/P NEW LOW 30 MIN: CPT | Performed by: UROLOGY

## 2018-04-06 PROCEDURE — 81001 URINALYSIS AUTO W/SCOPE: CPT | Performed by: UROLOGY

## 2018-04-06 NOTE — MR AVS SNAPSHOT
After Visit Summary   4/6/2018    Santa Kinsey    MRN: [id number]           Patient Information     Date Of Birth          1975        Visit Information        Provider Department      4/6/2018 12:00 PM Feliciano Cortes MD [facility]        Today's Diagnoses     Yeast vaginitis    -  1    Female stress incontinence           Follow-ups after your visit        Additional Services     OB/GYN REFERRAL       Your provider has referred you to:  FMG: [facility] [phone]   [email or web address]    Please be aware that coverage of these services is subject to the terms and limitations of your health insurance plan.  Call member services at your health plan with any benefit or coverage questions.      Please bring the following with you to your appointment:    (1) Any X-Rays, CTs or MRIs which have been performed.  Contact the facility where they were done to arrange for  prior to your scheduled appointment.   (2) List of current medications   (3) This referral request   (4) Any documents/labs given to you for this referral            PHYSICAL THERAPY REFERRAL       *This therapy referral will be filtered to a centralized scheduling office at [facility] and the patient will receive a call to schedule an appointment at a [place] location most convenient for them. *     [facility] provides Physical Therapy evaluation and treatment and many specialty services across the [place] system.  If requesting a specialty program, please choose from the list below.    If you have not heard from the scheduling office within 2 business days, please call [phone] for all locations, with the exception of [place], please call [phone] and [facility], please call [phone]  Treatment: Evaluation & Treatment  Special Instructions/Modalities:   Special Programs: Incontinence Pelvic Floor  "Program    Please be aware that coverage of these services is subject to the terms and limitations of your health insurance plan.  Call member services at your health plan with any benefit or coverage questions.      **Note to Provider:  If you are referring outside of [place] for the therapy appointment, please list the name of the location in the \"special instructions\" above, print the referral and give to the patient to schedule the appointment.                  Who to contact     If you have questions or need follow up information about today's clinic visit or your schedule please contact [facility] directly at [phone].  Normal or non-critical lab and imaging results will be communicated to you by [employer], letter or phone within 4 business days after the clinic has received the results. If you do not hear from us within 7 days, please contact the clinic through [employer] or phone. If you have a critical or abnormal lab result, we will notify you by phone as soon as possible.  Submit refill requests through [employer] or call your pharmacy and they will forward the refill request to us. Please allow 3 business days for your refill to be completed.          Additional Information About Your Visit        MyChart Information     [employer] gives you secure access to your electronic health record. If you see a primary care provider, you can also send messages to your care team and make appointments. If you have questions, please call your primary care clinic.  If you do not have a primary care provider, please call [phone] and they will assist you.        Care EveryWhere ID     This is your Care EveryWhere ID. This could be used by other organizations to access your [place] medical records  [id number]        Your Vitals Were     Pulse Last Period Pulse Oximetry             76 03/21/2018 100%          Blood Pressure from Last 3 Encounters:   04/06/18 102/58   03/26/18 106/60   02/19/18 110/58    " Weight from Last 3 Encounters:   03/26/18 55.3 kg (122 lb)   02/19/18 54.3 kg (119 lb 9.6 oz)   11/14/17 54.4 kg (120 lb)              We Performed the Following     OB/GYN REFERRAL     PHYSICAL THERAPY REFERRAL     UA reflex to Microscopic and Culture     Urine Microscopic          Today's Medication Changes          These changes are accurate as of 4/6/18 12:34 PM.  If you have any questions, ask your nurse or doctor.               Stop taking these medicines if you haven't already. Please contact your care team if you have questions.     amoxicillin 500 MG capsule   Commonly known as:  AMOXIL   Stopped by:  Feliciano Cortes MD           ciprofloxacin 250 MG tablet   Commonly known as:  CIPRO   Stopped by:  Feliciano Cortes MD           ondansetron 4 MG tablet   Commonly known as:  ZOFRAN   Stopped by:  Feliciano Cortes MD                    Primary Care Provider Office Phone # Fax #    Julia ChristensenKALA [facility] [phone]       [facility] [address]  [facility] [address]        Equal Access to Services     [facility]: Hadii aad ku hadasho Soomaali, waaxda luqadaha, qaybta kaalmada adeegyada, waxay idiin hayfabianon ashley noel . So [facility] [phone].    ATENCIÓN: Si habla español, tiene a contreras disposición servicios gratuitos de asistencia lingüística. [facility] [phone].    We comply with applicable federal civil rights laws and [place] laws. We do not discriminate on the basis of race, color, national origin, age, disability, sex, sexual orientation, or gender identity.            Thank you!     Thank you for choosing [facility]  for your care. Our goal is always to provide you with excellent care. Hearing back from our patients is one way we can continue to improve our services. Please take a few minutes to complete the written survey that you may receive in the mail after your visit with us. Thank you!             Your Updated  Medication List - Protect others around you: Learn how to safely use, store and throw away your medicines at www.disposemymeds.org.          This list is accurate as of 4/6/18 12:34 PM.  Always use your most recent med list.                   Brand Name Dispense Instructions for use Diagnosis    IBUPROFEN PO       Yeast vaginitis

## 2018-04-06 NOTE — PROGRESS NOTES
S: See dictated note   Current Outpatient Prescriptions   Medication Sig Dispense Refill     IBUPROFEN PO        Allergies   Allergen Reactions     No Known Drug Allergies      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     Other, mixed, or unspecified nondependent drug abuse, unspecified     Drug /ETOH abuse (non-depend.)     Past Surgical History:   Procedure Laterality Date     C APPENDECTOMY        Family History   Problem Relation Age of Onset     CANCER Paternal Grandfather      Breast Cancer Paternal Aunt      Social History     Social History     Marital status:      Spouse name: N/A     Number of children: N/A     Years of education: N/A     Social History Main Topics     Smoking status: Current Every Day Smoker     Packs/day: 0.75     Years: 8.00     Smokeless tobacco: Never Used     Alcohol use No     Drug use: No     Sexual activity: Not Currently     Partners: Male     Birth control/ protection: Surgical     Other Topics Concern      Service No     Blood Transfusions No     Caffeine Concern No     Occupational Exposure No     Hobby Hazards No     Sleep Concern No     Stress Concern Yes     Work, family.     Weight Concern No     Special Diet No     Back Care No     Exercise Yes     Bike Helmet Yes     Seat Belt Yes     Self-Exams No     hand-out of sbe given to patient     Parent/Sibling W/ Cabg, Mi Or Angioplasty Before 65f 55m? No     Social History Narrative       REVIEW OF SYSTEMS  =================  C: NEGATIVE for fever, chills, change in weight  I: NEGATIVE for worrisome rashes, moles or lesions  E/M: NEGATIVE for ear, mouth and throat problems  R: NEGATIVE for significant cough or SHORTNESS OF BREATH  CV:  NEGATIVE for chest pain, palpitations or peripheral edema  GI: NEGATIVE for nausea, abdominal pain, heartburn, or change in bowel habits  NEURO:  NEGATIVE numbness/weakness  : see HPI  PSYCH: NEGATIVE depression/anxiety  LYmph: no new enlarged lymph nodes  Ortho: no new trauma/movements      Physical Exam:  /58 (BP Location: Right arm, Patient Position: Chair, Cuff Size: Adult Regular)  Pulse 76  LMP 03/21/2018  SpO2 100%   Patient is pleasant, in no acute distress, good general condition.  HEENT:  Normalcephalic, atraumatic  Lung: no evidence of respiratory distress    Abdomen: Soft, nondistended, non tender. No masses. No rebound or guarding.   Exam: normal vaginal introitus.  No vaginal prolapse.  Normal urethra.  Skin: Warm and dry.  No redness.  Neuro: grossly normal  Psych normal mood and affect  Musculoskeletal  moving all extremities    Assessment/Plan:   See dictated note

## 2018-04-06 NOTE — PROGRESS NOTES
Visit Date:   2018      SUBJECTIVE:  The patient is a pleasant 42-year-old  female who was requested to be seen by Jackson Christensen in consultation with regard to the patient's urinary incontinence with history of recurrent yeast infection.  The patient said that for this past year she had several episodes of vaginal yeast infection.  This happened in February and March of last year and also recently.  She has mainly abnormal vaginal discharges.  A wet prep has showed evidence of yeast along with 1 episode of bacterial vaginosis.  Urine test is completely normal without any evidence of urinary tract infection.  She also has noticed some stress incontinence this past year since she has become more active and exercising.  She only leaks when she exercises, otherwise she does not have a problem.  She voids every hour and none at night.  She has 4 children that were delivered vaginally.  She has no dysuria or hematuria.  She has no bowel problems.      ASSESSMENT:  Altagracia 42-year-old  female with urinary stress incontinence with recurrent yeast vaginitis.      RECOMMENDATION:  With regard to her urinary stress incontinence.  We discussed observation versus physical therapy versus mid urethral sling.  Since her symptoms are mild and only happens with exercising, I recommend physical therapy next.  A referral was made.  In terms of her yeast vaginitis we will make a referral for her to see Gynecology for further evaluation.         REBECCA FOY MD             D: 2018   T: 2018   MT: SARAH      Name:     PRAMOD NASH   MRN:      [id number]        Account:      [id number]   :      1975           Visit Date:   2018      Document: [id number]       cc: JACKSON ARMENDARIZ, CNP

## 2018-05-22 ENCOUNTER — E-VISIT (OUTPATIENT)
Dept: FAMILY MEDICINE | Facility: OTHER | Age: 43
End: 2018-05-22
Payer: COMMERCIAL

## 2018-05-22 DIAGNOSIS — Z53.9 ERRONEOUS ENCOUNTER--DISREGARD: Primary | ICD-10-CM

## 2018-05-22 NOTE — MR AVS SNAPSHOT
After Visit Summary   5/22/2018    Santa Kinsey    MRN: [id number]           Patient Information     Date Of Birth          1975        Visit Information        Provider Department      5/22/2018 8:23 AM Julia Christensen APRN CNP [facility]        Today's Diagnoses     ERRONEOUS ENCOUNTER--DISREGARD    -  1       Follow-ups after your visit        Your next 10 appointments already scheduled     May 23, 2018 12:00 PM CDT   Telephone Visit with KALA Lau CNP   [facility]    [address]  [facility] [address]   [phone]           Note: this is not an onsite visit; there is no need to come to the facility.              Future tests that were ordered for you today     Open Future Orders        Priority Expected Expires Ordered    *UA reflex to Microscopic and Culture ([place] and [facility] (except [place] and [place]) Routine 5/23/2018 5/23/2019 5/23/2018    Wet prep Routine 5/23/2018 5/23/2018 5/23/2018            Who to contact     If you have questions or need follow up information about today's clinic visit or your schedule please contact [facility] directly at [phone].  Normal or non-critical lab and imaging results will be communicated to you by "[employer]hart, letter or phone within 4 business days after the clinic has received the results. If you do not hear from us within 7 days, please contact the clinic through "[employer]hart or phone. If you have a critical or abnormal lab result, we will notify you by phone as soon as possible.  Submit refill requests through [employer] or call your pharmacy and they will forward the refill request to us. Please allow 3 business days for your refill to be completed.          Additional Information About Your Visit        "[employer]hart Information     [employer] gives you secure access to your electronic health record. If you see a primary care provider, you  can also send messages to your care team and make appointments. If you have questions, please call your primary care clinic.  If you do not have a primary care provider, please call [phone] and they will assist you.        Care EveryWhere ID     This is your Care EveryWhere ID. This could be used by other organizations to access your [place] medical records  [id number]         Blood Pressure from Last 3 Encounters:   04/06/18 102/58   03/26/18 106/60   02/19/18 110/58    Weight from Last 3 Encounters:   03/26/18 122 lb (55.3 kg)   02/19/18 119 lb 9.6 oz (54.3 kg)   11/14/17 120 lb (54.4 kg)              Today, you had the following     No orders found for display       Primary Care Provider Office Phone # Fax #    Julia FaustinaKALA [facility] [phone]       [address]  [facility] [address]        Equal Access to Services     MARY DOWLING : Hadii aad ku hadasho Soomaali, waaxda luqadaha, qaybta kaalmada adeegyada, waxay idiin hayaan ashley noel . So [facility] [phone].    ATENCIÓN: Si habla español, tiene a contreras disposición servicios gratuitos de asistencia lingüística. Sinan al [phone].    We comply with applicable federal civil rights laws and [place] laws. We do not discriminate on the basis of race, color, national origin, age, disability, sex, sexual orientation, or gender identity.            Thank you!     Thank you for choosing [facility]  for your care. Our goal is always to provide you with excellent care. Hearing back from our patients is one way we can continue to improve our services. Please take a few minutes to complete the written survey that you may receive in the mail after your visit with us. Thank you!             Your Updated Medication List - Protect others around you: Learn how to safely use, store and throw away your medicines at www.disposemymeds.org.          This list is accurate as of 5/22/18 11:59  PM.  Always use your most recent med list.                   Brand Name Dispense Instructions for use Diagnosis    IBUPROFEN PO       Yeast vaginitis

## 2018-05-23 ENCOUNTER — VIRTUAL VISIT (OUTPATIENT)
Dept: FAMILY MEDICINE | Facility: OTHER | Age: 43
End: 2018-05-23
Payer: COMMERCIAL

## 2018-05-23 DIAGNOSIS — N89.8 VAGINAL DISCHARGE: ICD-10-CM

## 2018-05-23 DIAGNOSIS — B96.89 BV (BACTERIAL VAGINOSIS): Primary | ICD-10-CM

## 2018-05-23 DIAGNOSIS — N76.0 BV (BACTERIAL VAGINOSIS): Primary | ICD-10-CM

## 2018-05-23 DIAGNOSIS — R35.0 URINARY FREQUENCY: ICD-10-CM

## 2018-05-23 LAB
ALBUMIN UR-MCNC: NEGATIVE MG/DL
APPEARANCE UR: CLEAR
BILIRUB UR QL STRIP: NEGATIVE
COLOR UR AUTO: YELLOW
GLUCOSE UR STRIP-MCNC: NEGATIVE MG/DL
HGB UR QL STRIP: ABNORMAL
KETONES UR STRIP-MCNC: NEGATIVE MG/DL
LEUKOCYTE ESTERASE UR QL STRIP: NEGATIVE
NITRATE UR QL: NEGATIVE
PH UR STRIP: 6 PH (ref 5–7)
RBC #/AREA URNS AUTO: NORMAL /HPF
SOURCE: ABNORMAL
SP GR UR STRIP: 1 (ref 1–1.03)
SPECIMEN SOURCE: ABNORMAL
UROBILINOGEN UR STRIP-ACNC: 0.2 EU/DL (ref 0.2–1)
WBC #/AREA URNS AUTO: NORMAL /HPF
WET PREP SPEC: ABNORMAL

## 2018-05-23 PROCEDURE — 81001 URINALYSIS AUTO W/SCOPE: CPT | Performed by: NURSE PRACTITIONER

## 2018-05-23 PROCEDURE — 98966 PH1 ASSMT&MGMT NQHP 5-10: CPT | Performed by: NURSE PRACTITIONER

## 2018-05-23 PROCEDURE — 87210 SMEAR WET MOUNT SALINE/INK: CPT | Performed by: NURSE PRACTITIONER

## 2018-05-23 RX ORDER — METRONIDAZOLE 500 MG/1
500 TABLET ORAL 2 TIMES DAILY
Qty: 14 TABLET | Refills: 0 | Status: SHIPPED | OUTPATIENT
Start: 2018-05-23 | End: 2018-06-22

## 2018-05-23 NOTE — MR AVS SNAPSHOT
After Visit Summary   5/23/2018    Santa Kinsey    MRN: [id number]           Patient Information     Date Of Birth          1975        Visit Information        Provider Department      5/23/2018 12:00 PM Julia Christensen APRN CNP [facility]        Today's Diagnoses     BV (bacterial vaginosis)    -  1    Urinary frequency        Vaginal discharge           Follow-ups after your visit        Additional Services     OB/GYN REFERRAL       Your provider has referred you to:  FMG: [facility] [phone]   [email or web address]/    Please be aware that coverage of these services is subject to the terms and limitations of your health insurance plan.  Call member services at your health plan with any benefit or coverage questions.      Please bring the following with you to your appointment:    (1) Any X-Rays, CTs or MRIs which have been performed.  Contact the facility where they were done to arrange for  prior to your scheduled appointment.   (2) List of current medications   (3) This referral request   (4) Any documents/labs given to you for this referral                  Who to contact     If you have questions or need follow up information about today's clinic visit or your schedule please contact [facility] directly at [phone].  Normal or non-critical lab and imaging results will be communicated to you by MyChart, letter or phone within 4 business days after the clinic has received the results. If you do not hear from us within 7 days, please contact the clinic through MyChart or phone. If you have a critical or abnormal lab result, we will notify you by phone as soon as possible.  Submit refill requests through [employer] or call your pharmacy and they will forward the refill request to us. Please allow 3 business days for your refill to be completed.          Additional Information About Your Visit         [employer] Information     [employer] gives you secure access to your electronic health record. If you see a primary care provider, you can also send messages to your care team and make appointments. If you have questions, please call your primary care clinic.  If you do not have a primary care provider, please call [phone] and they will assist you.        Care EveryWhere ID     This is your Care EveryWhere ID. This could be used by other organizations to access your [place] medical records  [id number]         Blood Pressure from Last 3 Encounters:   04/06/18 102/58   03/26/18 106/60   02/19/18 110/58    Weight from Last 3 Encounters:   03/26/18 122 lb (55.3 kg)   02/19/18 119 lb 9.6 oz (54.3 kg)   11/14/17 120 lb (54.4 kg)              We Performed the Following     *UA reflex to Microscopic and Culture ([place] and [facility] (except [place] and [place])     OB/GYN REFERRAL     Urine Microscopic     Wet prep          Today's Medication Changes          These changes are accurate as of 5/23/18 11:59 PM.  If you have any questions, ask your nurse or doctor.               Start taking these medicines.        Dose/Directions    metroNIDAZOLE 500 MG tablet   Commonly known as:  FLAGYL   Used for:  BV (bacterial vaginosis)        Dose:  500 mg   Take 1 tablet (500 mg) by mouth 2 times daily   Quantity:  14 tablet   Refills:  0            Where to get your medicines      These medications were sent to [id number] - [place], [place] - [address]  [address] [address]    Hours:  [place] - numbers unchanged   9/8/03  Phone:  [phone]     metroNIDAZOLE 500 MG tablet                Primary Care Provider Office Phone # Fax #    KALA Lau [phone]       [facility] [address]  [facility] [address]        Equal Access to Services     MARY DOWLING AH: Apple Jaquez, brittney russo, janice lange  dayday olivaskenn la'aan ah. So [facility] [phone].    ATENCIÓN: Si habla eliane, tiene a contreras disposición servicios gratuitos de asistencia lingüística. Sinan al [phone].    We comply with applicable federal civil rights laws and [place] laws. We do not discriminate on the basis of race, color, national origin, age, disability, sex, sexual orientation, or gender identity.            Thank you!     Thank you for choosing [facility]  for your care. Our goal is always to provide you with excellent care. Hearing back from our patients is one way we can continue to improve our services. Please take a few minutes to complete the written survey that you may receive in the mail after your visit with us. Thank you!             Your Updated Medication List - Protect others around you: Learn how to safely use, store and throw away your medicines at www.disposemymeds.org.          This list is accurate as of 5/23/18 11:59 PM.  Always use your most recent med list.                   Brand Name Dispense Instructions for use Diagnosis    IBUPROFEN PO       Yeast vaginitis       metroNIDAZOLE 500 MG tablet    FLAGYL    14 tablet    Take 1 tablet (500 mg) by mouth 2 times daily    BV (bacterial vaginosis)

## 2018-05-23 NOTE — PROGRESS NOTES
"Santa Kinsey is a 43 year old female who is being evaluated via a telephone visit.      The patient has been notified of following:     \"This telephone visit will be conducted via a call between you and your physician/provider. We have found that certain health care needs can be provided without the need for a physical exam.  This service lets us provide the care you need with a short phone conversation.  If a prescription is necessary we can send it directly to your pharmacy.  If lab work is needed we can place an order for that and you can then stop by our lab to have the test done at a later time.    We will bill your insurance company for this service.  Please check with your medical insurance if this type of visit is covered. You may be responsible for the cost of this type of visit if insurance coverage is denied.  The typical cost is $30 (10min), $59 (11-20min) and $85 (21-30min).  Most often these visits are shorter than 10 minutes.    If during the course of the call the physician/provider feels a telephone visit is not appropriate, you will not be charged for this service.\"       Consent has been obtained for this service by care team member: yes.   See the scanned image in the medical record.    Santa Kinsey complains of  Vaginal Problem      I have reviewed and updated the patient's Past Medical History, Social History, Family History and Medication List.    ALLERGIES  No known drug allergies    Liza Stafford (MA signature)    Additional provider notes:   White discharge  Foul odor  Saw Dr. Cortes for stress incontinence was told to try Kegel or physical therapy.   She can feel when she has BV.     Assessment/Plan:  (R35.0) Urinary frequency  (primary encounter diagnosis)  Comment: UA normal.  Plan: Work on physical therapy and kegel exercises.     (N89.8) Vaginal discharge  Discussed medication and follow up plan to see gynecology.   Comment:   Plan:   Flagyl twice daily for 7 days then follow up " for gynecology for recurrent vaginal symptoms and need for pap.               I have reviewed the note as documented above.  This accurately captures the substance of my conversation with the patient,      Total time of call between patient and provider was 8 minutes

## 2018-06-22 ENCOUNTER — MYC MEDICAL ADVICE (OUTPATIENT)
Dept: FAMILY MEDICINE | Facility: OTHER | Age: 43
End: 2018-06-22

## 2018-06-22 NOTE — TELEPHONE ENCOUNTER
Chart review: was seen on 09/25/2017 for ian.    Medications are not on problem list,  She will need an appointment if OTC is not helping.  Responded via [employer].  Jostin Rocha, RN, BSN

## 2018-06-25 NOTE — TELEPHONE ENCOUNTER
[employer] message read with no response.  Will close encounter at this time.    Jostin Rocha, RN, BSN

## 2018-07-26 ENCOUNTER — OFFICE VISIT (OUTPATIENT)
Dept: OBGYN | Facility: OTHER | Age: 43
End: 2018-07-26
Payer: COMMERCIAL

## 2018-07-26 VITALS
HEART RATE: 68 BPM | WEIGHT: 118.5 LBS | DIASTOLIC BLOOD PRESSURE: 62 MMHG | SYSTOLIC BLOOD PRESSURE: 104 MMHG | BODY MASS INDEX: 20.87 KG/M2

## 2018-07-26 DIAGNOSIS — N89.8 VAGINAL DISCHARGE: Primary | ICD-10-CM

## 2018-07-26 DIAGNOSIS — Z12.4 SCREENING FOR MALIGNANT NEOPLASM OF CERVIX: ICD-10-CM

## 2018-07-26 DIAGNOSIS — Z11.3 ROUTINE SCREENING FOR STI (SEXUALLY TRANSMITTED INFECTION): ICD-10-CM

## 2018-07-26 LAB
SPECIMEN SOURCE: ABNORMAL
WET PREP SPEC: ABNORMAL

## 2018-07-26 PROCEDURE — 87591 N.GONORRHOEAE DNA AMP PROB: CPT | Performed by: ADVANCED PRACTICE MIDWIFE

## 2018-07-26 PROCEDURE — 87624 HPV HI-RISK TYP POOLED RSLT: CPT | Performed by: ADVANCED PRACTICE MIDWIFE

## 2018-07-26 PROCEDURE — G0145 SCR C/V CYTO,THINLAYER,RESCR: HCPCS | Performed by: ADVANCED PRACTICE MIDWIFE

## 2018-07-26 PROCEDURE — 87210 SMEAR WET MOUNT SALINE/INK: CPT | Performed by: ADVANCED PRACTICE MIDWIFE

## 2018-07-26 PROCEDURE — 99213 OFFICE O/P EST LOW 20 MIN: CPT | Performed by: ADVANCED PRACTICE MIDWIFE

## 2018-07-26 PROCEDURE — 87491 CHLMYD TRACH DNA AMP PROBE: CPT | Performed by: ADVANCED PRACTICE MIDWIFE

## 2018-07-26 RX ORDER — TERCONAZOLE 0.4 %
1 CREAM WITH APPLICATOR VAGINAL AT BEDTIME
Qty: 45 G | Refills: 3 | Status: SHIPPED | OUTPATIENT
Start: 2018-07-26 | End: 2018-08-02

## 2018-07-26 NOTE — MR AVS SNAPSHOT
After Visit Summary   7/26/2018    Santa Kinsey    MRN: [id number]           Patient Information     Date Of Birth          1975        Visit Information        Provider Department      7/26/2018 6:00 PM [facility]        Today's Diagnoses     Vaginal discharge    -  1    Screening for malignant neoplasm of cervix        Routine screening for STI (sexually transmitted infection)          Care Instructions      Vaginal Infection: Understanding the Vaginal Environment  The vagina is a canal. It connects the uterus (womb) to the outside of the body. The vagina is home to many types of bacteria and other tiny organisms. These different bacteria most often stay balanced in number. This keeps the vagina healthy. If the balance changes, an infection may result.    A Healthy Environment  Many types of bacteria are present in a healthy vagina. They don t cause problems if their amounts stay balanced. Small amounts of yeast may also be present without causing problems. The most common type of bacteria in the vagina is lactobacillus. It helps keep the vagina at a low pH. A low pH keeps bad bacteria from taking over.  Normal Vaginal Discharge  The vagina makes fluid. It is sent out as discharge. This keeps the vagina healthy. Normal discharge can be clear, white, or yellowish. Most women find that normal discharge varies in amount and color through the month.  An Unhealthy Environment  The vaginal environment may get out of balance. This may result in a vaginal infection. There are a few reasons this can happen. The pH may have changed. The amount of one organism, such as yeast, may increase. Or an outside organism may get into the vagina and throw off the balance:    Bacterial vaginosis (BV). BV is due to an imbalance in the normal bacteria in the vagina. Lactobacillus bacteria decrease. As a result, the numbers of bad bacteria increase.    Candidiasis(yeast  infection). Yeast is a type of fungus. A yeast infection occurs when yeast cells in the vagina increase. They then attack vaginal tissues. A type of yeast called Candida albicans is often involved.    Trichomoniasis ( trich ). Trich is a parasite. It is passed from one person to another during sex. Men with trich often don t have any symptoms. In women, it can take weeks or months before symptoms appear.    6899-0789 The [employer]. [address] [place] [address], [place] [address]. All rights reserved. This information is not intended as a substitute for professional medical care. Always follow your healthcare professional's instructions.  This information has been modified by your health care provider with permission from the publisher.        Yeast Infection (Candida Vaginal Infection)    You have a Candida vaginal infection. This is also known as a yeast infection. It is most often caused by a type of yeast (fungus) called Candida. Candida are normally found in the vagina. But if they increase in number, this can lead to infection and cause symptoms.  Symptoms of a yeast infection can include:    Clumpy or thin, white discharge, which may look like cottage cheese    Itching or burning    Burning with urination  Certain factors can make a yeast infection more likely. These can include:    Taking certain medicines, such as antibiotics or birth control pills    Pregnancy    Diabetes    Weak immune system  A yeast infection is most often treated with antifungal medicine. This may be given as a vaginal cream or pills you take by mouth. Treatment may last for about 1 to 7 days. Women with severe or recurrent infections may need longer courses of treatment.  Home care    If you re prescribed medicine, be sure to use it as directed. Finish all of the medicine, even if your symptoms go away. Note: Don t try to treat yourself using over-the-counter products without talking to your provider first. He or she will let you  know if this is a good option for you.    Ask your provider what steps you can take to help reduce your risk of having a yeast infection in the future.  Follow-up care  Follow up with your healthcare provider, or as directed.  When to seek medical advice  Call your healthcare provider right away if:    You have a fever of 100.4 F (38 C) or higher, or as directed by your provider.    Your symptoms worsen, or they don t go away within a few days of starting treatment.    You have new pain in the lower belly or pelvic region.    You have side effects that bother you or a reaction to the cream or pills you re prescribed.    You or any partners you have sex with have new symptoms, such as a rash, joint pain, or sores.  Date Last Reviewed: 10/1/2017    2514-1540 [employer] [address] [place] [address]. All rights reserved. This information is not intended as a substitute for professional medical care. Always follow your healthcare professional's instructions.                Follow-ups after your visit        Follow-up notes from your care team     Return if symptoms worsen or fail to improve.      Who to contact     If you have questions or need follow up information about today's clinic visit or your schedule please contact [facility] directly at [phone].  Normal or non-critical lab and imaging results will be communicated to you by [employer], letter or phone within 4 business days after the clinic has received the results. If you do not hear from us within 7 days, please contact the clinic through [employer] or phone. If you have a critical or abnormal lab result, we will notify you by phone as soon as possible.  Submit refill requests through [employer] or call your pharmacy and they will forward the refill request to us. Please allow 3 business days for your refill to be completed.          Additional Information About Your Visit        [employer] Information     [employer] gives  you secure access to your electronic health record. If you see a primary care provider, you can also send messages to your care team and make appointments. If you have questions, please call your primary care clinic.  If you do not have a primary care provider, please call [phone] and they will assist you.        Care EveryWhere ID     This is your Care EveryWhere ID. This could be used by other organizations to access your [place] medical records  [id number]        Your Vitals Were     Pulse Last Period Breastfeeding? BMI (Body Mass Index)          68 07/19/2018 (Approximate) No 20.87 kg/m2         Blood Pressure from Last 3 Encounters:   07/26/18 104/62   04/06/18 102/58   03/26/18 106/60    Weight from Last 3 Encounters:   07/26/18 118 lb 8 oz (53.8 kg)   03/26/18 122 lb (55.3 kg)   02/19/18 119 lb 9.6 oz (54.3 kg)              We Performed the Following     Chlamydia trachomatis PCR     HIV Antigen Antibody Combo     HPV High Risk Types DNA Cervical     Neisseria gonorrhoeae PCR     Pap imaged thin layer screen with HPV - recommended age 30 - 65 years (select HPV order below)     Treponema Abs w Reflex to RPR and Titer     Wet prep          Today's Medication Changes          These changes are accurate as of 7/26/18  6:53 PM.  If you have any questions, ask your nurse or doctor.               Start taking these medicines.        Dose/Directions    terconazole 0.4 % cream   Commonly known as:  TERAZOL 7   Used for:  Vaginal discharge   Started by:  Zaida Jane APRN CNM        Dose:  1 applicator   Place 1 applicator vaginally At Bedtime for 7 days   Quantity:  45 g   Refills:  3            Where to get your medicines      These medications were sent to [id number] - [place], [place] - [address]  [address] [facility] [address]    Hours:  Formerly [place] - numbers unchanged   9/8/03  Phone:  [phone]     terconazole 0.4 % cream                Primary Care Provider Office Phone # Fax #     Julia Christensen, APRN [phone]       [address]  [facility] [address]        Equal Access to Services     MARY DOWLING : Apple mauricio Soyony, waaxda luqadaha, qaybta kaalmada adeegyada, janice sanjuanitain hayaarian olivasgenetmary ann corrales. So [facility] [phone].    ATENCIÓN: Si habla español, tiene a contreras disposición servicios gratuitos de asistencia lingüística. Llame al [phone].    We comply with applicable federal civil rights laws and [place] laws. We do not discriminate on the basis of race, color, national origin, age, disability, sex, sexual orientation, or gender identity.            Thank you!     Thank you for choosing [facility]  for your care. Our goal is always to provide you with excellent care. Hearing back from our patients is one way we can continue to improve our services. Please take a few minutes to complete the written survey that you may receive in the mail after your visit with us. Thank you!             Your Updated Medication List - Protect others around you: Learn how to safely use, store and throw away your medicines at www.disposemymeds.org.          This list is accurate as of 7/26/18  6:53 PM.  Always use your most recent med list.                   Brand Name Dispense Instructions for use Diagnosis    IBUPROFEN PO       Yeast vaginitis       terconazole 0.4 % cream    TERAZOL 7    45 g    Place 1 applicator vaginally At Bedtime for 7 days    Vaginal discharge

## 2018-07-26 NOTE — PATIENT INSTRUCTIONS
Vaginal Infection: Understanding the Vaginal Environment  The vagina is a canal. It connects the uterus (womb) to the outside of the body. The vagina is home to many types of bacteria and other tiny organisms. These different bacteria most often stay balanced in number. This keeps the vagina healthy. If the balance changes, an infection may result.    A Healthy Environment  Many types of bacteria are present in a healthy vagina. They don t cause problems if their amounts stay balanced. Small amounts of yeast may also be present without causing problems. The most common type of bacteria in the vagina is lactobacillus. It helps keep the vagina at a low pH. A low pH keeps bad bacteria from taking over.  Normal Vaginal Discharge  The vagina makes fluid. It is sent out as discharge. This keeps the vagina healthy. Normal discharge can be clear, white, or yellowish. Most women find that normal discharge varies in amount and color through the month.  An Unhealthy Environment  The vaginal environment may get out of balance. This may result in a vaginal infection. There are a few reasons this can happen. The pH may have changed. The amount of one organism, such as yeast, may increase. Or an outside organism may get into the vagina and throw off the balance:    Bacterial vaginosis (BV). BV is due to an imbalance in the normal bacteria in the vagina. Lactobacillus bacteria decrease. As a result, the numbers of bad bacteria increase.    Candidiasis(yeast infection). Yeast is a type of fungus. A yeast infection occurs when yeast cells in the vagina increase. They then attack vaginal tissues. A type of yeast called Candida albicans is often involved.    Trichomoniasis ( trich ). Trich is a parasite. It is passed from one person to another during sex. Men with trich often don t have any symptoms. In women, it can take weeks or months before symptoms appear.    5467-3893 [employer]. [address] [place] [address]  [place] [address]. All rights reserved. This information is not intended as a substitute for professional medical care. Always follow your healthcare professional's instructions.  This information has been modified by your health care provider with permission from the publisher.        Yeast Infection (Candida Vaginal Infection)    You have a Candida vaginal infection. This is also known as a yeast infection. It is most often caused by a type of yeast (fungus) called Candida. Candida are normally found in the vagina. But if they increase in number, this can lead to infection and cause symptoms.  Symptoms of a yeast infection can include:    Clumpy or thin, white discharge, which may look like cottage cheese    Itching or burning    Burning with urination  Certain factors can make a yeast infection more likely. These can include:    Taking certain medicines, such as antibiotics or birth control pills    Pregnancy    Diabetes    Weak immune system  A yeast infection is most often treated with antifungal medicine. This may be given as a vaginal cream or pills you take by mouth. Treatment may last for about 1 to 7 days. Women with severe or recurrent infections may need longer courses of treatment.  Home care    If you re prescribed medicine, be sure to use it as directed. Finish all of the medicine, even if your symptoms go away. Note: Don t try to treat yourself using over-the-counter products without talking to your provider first. He or she will let you know if this is a good option for you.    Ask your provider what steps you can take to help reduce your risk of having a yeast infection in the future.  Follow-up care  Follow up with your healthcare provider, or as directed.  When to seek medical advice  Call your healthcare provider right away if:    You have a fever of 100.4 F (38 C) or higher, or as directed by your provider.    Your symptoms worsen, or they don t go away within a few days of starting treatment.    You  have new pain in the lower belly or pelvic region.    You have side effects that bother you or a reaction to the cream or pills you re prescribed.    You or any partners you have sex with have new symptoms, such as a rash, joint pain, or sores.  Date Last Reviewed: 10/1/2017    7956-5786 [employer]. [address] [place] [address]. All rights reserved. This information is not intended as a substitute for professional medical care. Always follow your healthcare professional's instructions.

## 2018-07-26 NOTE — LETTER
August 8, 2018    Santa Kinsey  [address] [facility] [address]    Dear Santa,  We are happy to inform you that your PAP smear result from 7/26/18 is normal.  We are now able to do a follow up test on PAP smears. The DNA test is for HPV (Human Papilloma Virus). Cervical cancer is closely linked with certain types of HPV. Your results showed no evidence of high risk HPV.  Therefore we recommend you return in 5 years for your next pap smear and HPV test.  You will still need to return to the clinic every year for an annual exam and other preventive tests.  Please contact the clinic at [phone] with any questions.  Sincerely,    KALA Galloway CNM/glenna

## 2018-07-26 NOTE — NURSING NOTE
"Chief Complaint   Patient presents with     Vaginal Problem     discuss yeast infections-currently discharge and cramping       Initial /62 (BP Location: Right arm, Patient Position: Sitting, Cuff Size: Adult Regular)  Pulse 68  Wt 118 lb 8 oz (53.8 kg)  LMP 07/19/2018 (Approximate)  Breastfeeding? No  BMI 20.87 kg/m2 Estimated body mass index is 20.87 kg/(m^2) as calculated from the following:    Height as of 2/19/18: 5' 3.19\" (1.605 m).    Weight as of this encounter: 118 lb 8 oz (53.8 kg).  Medication Reconciliation: complete    Marisela Oconnell CMA    "

## 2018-07-26 NOTE — PROGRESS NOTES
Santa Kinsey is a 43 year old who presents to the clinic for evaluation of chronic yeast.   Has been to the clinic every month or every other month with a vaginal infection.  Sometimes the wet prep is negative.   Sometimes is shows yeast, or BV or nothing.   She doesn't use antibiotics often.   Often when it shows a bacterial infection or nothing she develops itching and just treats with OTC antifungal.   She is not often sexually active but would like her pap and StI testing today.       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     Social History Main Topics     Smoking status: Former Smoker     Packs/day: 0.75     Years: 8.00     Types: Cigarettes     Quit date: 7/15/2018     Smokeless tobacco: Never Used      Comment: in process if quiting     Alcohol use No     Drug use: No     Sexual activity: Not Currently     Partners: Male     Birth control/ protection: Surgical     Other Topics Concern      Service No     Blood Transfusions No     Caffeine Concern No     Occupational Exposure No     Hobby Hazards No     Sleep Concern No     Stress Concern Yes     Work, family.     Weight Concern No     Special Diet No     Back Care No     Exercise Yes     Bike Helmet Yes     Seat Belt Yes     Self-Exams No     hand-out of sbe given to patient     Parent/Sibling W/ Cabg, Mi Or Angioplasty Before 65f 55m? No     Social History Narrative     Family History   Problem Relation Age of Onset      Cancer Paternal Grandfather      Breast Cancer Paternal Aunt             ROS: 10 point ROS neg other than the symptoms noted above in the HPI.      EXAM:  /62 (BP Location: Right arm, Patient Position: Sitting, Cuff Size: Adult Regular)  Pulse 68  Wt 118 lb 8 oz (53.8 kg)  LMP 07/19/2018 (Approximate)  Breastfeeding? No  BMI 20.87 kg/m2    : PELVIC EXAM:  Vulva: No external lesions, normal hair distribution, no adenopathy, BUS WNL  Vagina: Moist, pink, no abnormal discharge, well rugated, no lesions  Cervix:, smooth, pink, no visible lesions, neg CMT  Uterus: Normal size, anteverted, non-tender, mobile  Ovaries: No mass, non-tender, mobile  Rectal exam: deferred    Ph is normal.  Discharge Is normal and not consistent with BV.  No odor is noted    ASSESSMENT/PLAN:    (N89.8) Vaginal discharge  (primary encounter diagnosis)  Comment:   Plan: Wet prep, terconazole (TERAZOL 7) 0.4 % cream            (Z12.4) Screening for malignant neoplasm of cervix  Comment:   Plan: Pap imaged thin layer screen with HPV -         recommended age 30 - 65 years (select HPV order        below)            (Z11.3) Routine screening for STI (sexually transmitted infection)  Comment:   Plan: Neisseria gonorrhoeae PCR, Chlamydia         trachomatis PCR, HIV Antigen Antibody Combo,         Pap imaged thin layer screen with HPV -         recommended age 30 - 65 years (select HPV order        below), HPV High Risk Types DNA Cervical,         Treponema Abs w Reflex to RPR and Titer          Will treat presumptively for yeast. And discussed suppression.  There is no proven method for suppression so will start with one week of terazol followed by weekly treatments for three months.   If this is successful could continue if not could consider culture vs diflucan.

## 2018-07-29 LAB
C TRACH DNA SPEC QL NAA+PROBE: NEGATIVE
N GONORRHOEA DNA SPEC QL NAA+PROBE: NEGATIVE
SPECIMEN SOURCE: NORMAL
SPECIMEN SOURCE: NORMAL

## 2018-07-31 LAB
COPATH REPORT: NORMAL
PAP: NORMAL

## 2018-08-01 LAB
FINAL DIAGNOSIS: NORMAL
HPV HR 12 DNA CVX QL NAA+PROBE: NEGATIVE
HPV16 DNA SPEC QL NAA+PROBE: NEGATIVE
HPV18 DNA SPEC QL NAA+PROBE: NEGATIVE
SPECIMEN DESCRIPTION: NORMAL
SPECIMEN SOURCE CVX/VAG CYTO: NORMAL

## 2018-10-15 DIAGNOSIS — N30.00 ACUTE CYSTITIS WITHOUT HEMATURIA: ICD-10-CM

## 2018-10-15 RX ORDER — CIPROFLOXACIN 250 MG/1
TABLET, FILM COATED ORAL
Qty: 14 TABLET | Refills: 0 | OUTPATIENT
Start: 2018-10-15

## 2018-10-15 NOTE — TELEPHONE ENCOUNTER
Spoke with patient - she states she did not request this medication and does not need it.  Livia Balbuena, CMA

## 2018-10-15 NOTE — TELEPHONE ENCOUNTER
Cipro:  Last prescribed 2/2018 - please call patient to clarify request. If she is having symptoms, she will need an appointment.     Mary Ríos RN, BSN

## 2018-10-19 NOTE — NURSING NOTE
"Chief Complaint   Patient presents with     RECHECK     improvement w/at home wart treatment - Left plantar wart; LOV 10/24/2017       Initial Temp 98  F (36.7  C) (Temporal)  Ht 5' 3.19\" (1.605 m)  Wt 120 lb (54.4 kg)  BMI 21.13 kg/m2 Estimated body mass index is 21.13 kg/(m^2) as calculated from the following:    Height as of this encounter: 5' 3.19\" (1.605 m).    Weight as of this encounter: 120 lb (54.4 kg).  BP completed using cuff size: NA (Not Taken)  Medication Reconciliation: complete    Colleen Nowak CMA, November 14, 2017    "
yes

## 2019-06-03 ENCOUNTER — NURSE TRIAGE (OUTPATIENT)
Dept: FAMILY MEDICINE | Facility: OTHER | Age: 44
End: 2019-06-03

## 2019-06-03 ENCOUNTER — OFFICE VISIT (OUTPATIENT)
Dept: FAMILY MEDICINE | Facility: OTHER | Age: 44
End: 2019-06-03
Payer: COMMERCIAL

## 2019-06-03 VITALS
BODY MASS INDEX: 21.09 KG/M2 | SYSTOLIC BLOOD PRESSURE: 118 MMHG | OXYGEN SATURATION: 99 % | HEART RATE: 110 BPM | RESPIRATION RATE: 16 BRPM | TEMPERATURE: 100 F | WEIGHT: 119 LBS | DIASTOLIC BLOOD PRESSURE: 70 MMHG | HEIGHT: 63 IN

## 2019-06-03 DIAGNOSIS — J00 COMMON COLD VIRUS: ICD-10-CM

## 2019-06-03 DIAGNOSIS — R07.0 THROAT PAIN: Primary | ICD-10-CM

## 2019-06-03 LAB
DEPRECATED S PYO AG THROAT QL EIA: NORMAL
SPECIMEN SOURCE: NORMAL

## 2019-06-03 PROCEDURE — 87880 STREP A ASSAY W/OPTIC: CPT | Performed by: NURSE PRACTITIONER

## 2019-06-03 PROCEDURE — 99213 OFFICE O/P EST LOW 20 MIN: CPT | Performed by: NURSE PRACTITIONER

## 2019-06-03 PROCEDURE — 87081 CULTURE SCREEN ONLY: CPT | Performed by: NURSE PRACTITIONER

## 2019-06-03 ASSESSMENT — ANXIETY QUESTIONNAIRES
7. FEELING AFRAID AS IF SOMETHING AWFUL MIGHT HAPPEN: NOT AT ALL
6. BECOMING EASILY ANNOYED OR IRRITABLE: NOT AT ALL
4. TROUBLE RELAXING: NOT AT ALL
GAD7 TOTAL SCORE: 0
GAD7 TOTAL SCORE: 0
5. BEING SO RESTLESS THAT IT IS HARD TO SIT STILL: NOT AT ALL
GAD7 TOTAL SCORE: 0
3. WORRYING TOO MUCH ABOUT DIFFERENT THINGS: NOT AT ALL
1. FEELING NERVOUS, ANXIOUS, OR ON EDGE: NOT AT ALL
2. NOT BEING ABLE TO STOP OR CONTROL WORRYING: NOT AT ALL
7. FEELING AFRAID AS IF SOMETHING AWFUL MIGHT HAPPEN: NOT AT ALL

## 2019-06-03 ASSESSMENT — PATIENT HEALTH QUESTIONNAIRE - PHQ9
10. IF YOU CHECKED OFF ANY PROBLEMS, HOW DIFFICULT HAVE THESE PROBLEMS MADE IT FOR YOU TO DO YOUR WORK, TAKE CARE OF THINGS AT HOME, OR GET ALONG WITH OTHER PEOPLE: NOT DIFFICULT AT ALL
SUM OF ALL RESPONSES TO PHQ QUESTIONS 1-9: 0
SUM OF ALL RESPONSES TO PHQ QUESTIONS 1-9: 0

## 2019-06-03 ASSESSMENT — MIFFLIN-ST. JEOR: SCORE: 1168.91

## 2019-06-03 NOTE — RESULT ENCOUNTER NOTE
Results discussed with patient in clinic. States understanding of these results.    Kristen White CNP

## 2019-06-03 NOTE — PROGRESS NOTES
Subjective      Answers for HPI/ROS submitted by the patient on 6/3/2019   If you checked off any problems, how difficult have these problems made it for you to do your work, take care of things at home, or get along with other people?: Not difficult at all  PHQ9 TOTAL SCORE: 0    Santa Kinsey is a 44 year old female who presents to clinic today for the following health issues:    HPI   Dizziness/Cold symptoms   Onset: this morning, feeling better this afternoon    Description:   Do you feel faint:  no   Does it feel like the surroundings (bed, room) are moving: YES  Unsteady/off balance: not really  Have you passed out or fallen: no     Intensity: moderate    Progression of Symptoms:  improving    Accompanying Signs & Symptoms:  Heart palpitations: no   Nausea, vomiting: YES- nausea  Weakness in arms or legs: no   Fatigue: YES but has a cold with a cough   Vision or speech changes: no   Ringing in ears (Tinnitus): no   Hearing Loss: no     History:   Head trauma/concussion hx: no   Previous similar symptoms: no   Recent bleeding history: no     Precipitating factors:   Worse with activity or head movement: YES  Any new medications (BP?): no   Alcohol/drug abuse/withdrawal: no     Alleviating factors:   Does staying in a fixed position give relief:  YES    Therapies Tried and outcome: mucinex for cold symptoms     Yesterday morning woke with cough and sore throat and congestion. Today started to have dizziness and nausea this cleared after nap. She states pain in throat and congested. She was exposed to strep last week niece     Patient Active Problem List   Diagnosis     Atypical depressive disorder     Encounter for long-term current use of medication     Mild major depression (H)     Hyperlipidemia LDL goal <160     Anxiety     Encounter for smoking cessation counseling     Tobacco use disorder     Plantar verruca     ASCUS of cervix with negative high risk HPV     Intractable migraine without aura and with  "status migrainosus     Past Surgical History:   Procedure Laterality Date     C APPENDECTOMY         Social History     Tobacco Use     Smoking status: Former Smoker     Packs/day: 0.75     Years: 8.00     Pack years: 6.00     Types: Cigarettes     Last attempt to quit: 7/15/2018     Years since quittin.8     Smokeless tobacco: Never Used     Tobacco comment: in process if quiting   Substance Use Topics     Alcohol use: No     Family History   Problem Relation Age of Onset     Cancer Paternal Grandfather      Breast Cancer Paternal Aunt      Seizure Disorder Son          Current Outpatient Medications   Medication Sig Dispense Refill     IBUPROFEN PO        Allergies   Allergen Reactions     No Known Drug Allergies      Recent Labs   Lab Test 10/21/11  1507   TSH 1.63      BP Readings from Last 3 Encounters:   19 118/70   18 104/62   18 102/58    Wt Readings from Last 3 Encounters:   19 54 kg (119 lb)   18 53.8 kg (118 lb 8 oz)   18 55.3 kg (122 lb)                    Reviewed and updated as needed this visit by Provider  Allergies  Meds  Problems  Med Hx  Surg Hx         Review of Systems   ROS COMP: Constitutional, HEENT, cardiovascular, pulmonary, GI, , musculoskeletal, neuro, skin, endocrine and psych systems are negative, except as otherwise noted.      Objective    /70   Pulse 110   Temp 100  F (37.8  C) (Temporal)   Resp 16   Ht 1.6 m (5' 3\")   Wt 54 kg (119 lb)   SpO2 99%   BMI 21.08 kg/m    Body mass index is 21.08 kg/m .  Physical Exam   GENERAL: alert, no distress and fatigued  EYES: Eyes grossly normal to inspection, PERRL and conjunctivae and sclerae normal  HENT: normal cephalic/atraumatic, ear canals and TM's normal, nose and mouth without ulcers or lesions, nasal mucosa edematous , rhinorrhea clear, oropharynx clear, oral mucous membranes moist and tonsillar erythema  NECK: no adenopathy, no asymmetry, masses, or scars and thyroid normal to " palpation  RESP: lungs clear to auscultation - no rales, rhonchi or wheezes  CV: regular rate and rhythm, normal S1 S2, no S3 or S4, no murmur, click or rub, no peripheral edema and peripheral pulses strong  MS: no gross musculoskeletal defects noted, no edema  SKIN: no suspicious lesions or rashes  NEURO: Normal strength and tone, mentation intact and speech normal  BACK: no CVA tenderness, no paralumbar tenderness    Diagnostic Test Results:  Labs reviewed in Epic  Results for orders placed or performed in visit on 06/03/19 (from the past 24 hour(s))   Strep, Rapid Screen   Result Value Ref Range    Specimen Description Throat     Rapid Strep A Screen       NEGATIVE: No Group A streptococcal antigen detected by immunoassay, await culture report.           Assessment & Plan     1. Throat pain  Pending culture  - Strep, Rapid Screen    2. Common cold virus  Home care instructions were reviewed with the patient. The risks, benefits and treatment options of prescribed medications or other treatments have been discussed with the patient. The patient verbalized their understanding and should call or follow up if no improvement or if they develop further problems.           Patient Instructions   Reassured that this appears to be a viral infection.  Treat symptoms with warm salt water gargles, throat lozenges, and over the counter pain, such as tylenol and ibuprofen, as needed.      For dizziness please increase fluids and rest, recommend Flonase 2 sprays once daily for sinus inflammation this is over the counter. If symptoms return return to clinic for further evaluation.     Your rapid strep was negative if your culture is positive will call you with treatment recommendations.     Thank you  KALA Hernandez CNP [facility]

## 2019-06-03 NOTE — TELEPHONE ENCOUNTER
Reason for call:  Symptom  Reason for call:  Patient reporting a symptom    Symptom or request: dizziness    Duration (how long have symptoms been present): this morning    Have you been treated for this before? No    Additional comments: pt is coming in at 140 today but she has been having some dizzy spells this morning. Please advise.     Phone Number patient can be reached at:  Cell number on file:    Telephone Information:   Mobile [phone]       Best Time:  anytime    Can we leave a detailed message on this number:  YES    Call taken on 6/3/2019 at 8:40 AM by Vee Valverde

## 2019-06-03 NOTE — TELEPHONE ENCOUNTER
"1. DESCRIPTION: \"Describe your dizziness.\"      spinning  2. LIGHTHEADED: \"Do you feel lightheaded?\" (e.g., somewhat faint, woozy, weak upon standing)      Hot flash  3. VERTIGO: \"Do you feel like either you or the room is spinning or tilting?\" (i.e. vertigo)      Felt like room was spinning  4. SEVERITY: \"How bad is it?\"  \"Do you feel like you are going to faint?\" \"Can you stand and walk?\"    - MILD - walking normally    - MODERATE - interferes with normal activities (e.g., work, school)     - SEVERE - unable to stand, requires support to walk, feels like passing out now.       Feels better now. After laying down, dizziness passed.   5. ONSET:  \"When did the dizziness begin?\"      Hasn't felt well and was pushing herself to get ready and that's when dizziness began.   6. AGGRAVATING FACTORS: \"Does anything make it worse?\" (e.g., standing, change in head position)      Pushing herself brought it on  7. HEART RATE: \"Can you tell me your heart rate?\" \"How many beats in 15 seconds?\"  (Note: not all patients can do this)        unable  8. CAUSE: \"What do you think is causing the dizziness?\"      Maybe getting a fever, hasn't been feeling well.  9. RECURRENT SYMPTOM: \"Have you had dizziness before?\" If so, ask: \"When was the last time?\" \"What happened that time?\"      When she was getting the flu in the past  10. OTHER SYMPTOMS: \"Do you have any other symptoms?\" (e.g., fever, chest pain, vomiting, diarrhea, bleeding)        Cough, sore throat, stuffy nose.  11. PREGNANCY: \"Is there any chance you are pregnant?\" \"When was your last menstrual period?\"        No.    Daisha Harris RN on 6/3/2019 at 8:52 AM    "

## 2019-06-03 NOTE — TELEPHONE ENCOUNTER
Additional Information    Negative: Shock suspected (e.g., cold/pale/clammy skin, too weak to stand, low BP, rapid pulse)    Negative: Difficult to awaken or acting confused (e.g., disoriented, slurred speech)    Negative: Fainted, and still feels dizzy afterwards    Negative: Severe difficulty breathing (e.g., struggling for each breath, speaks in single words)    Negative: Overdose (accidental or intentional) of medications    Negative: New neurologic deficit that is present now: * Weakness of the face, arm, or leg on one side of the body * Numbness of the face, arm, or leg on one side of the body * Loss of speech or garbled speech    Negative: Heart beating < 50 beats per minute OR > 140 beats per minute    Negative: Sounds like a life-threatening emergency to the triager    Diarrhea is the main symptom    Negative: Shock suspected (e.g., cold/pale/clammy skin, too weak to stand, low BP, rapid pulse)    Negative: Difficult to awaken or acting confused (e.g., disoriented, slurred speech)    Negative: Sounds like a life-threatening emergency to the triager    Negative: Vomiting also present and worse than the diarrhea    Negative: Blood in stool and without diarrhea    Negative: SEVERE abdominal pain (e.g., excruciating) and present > 1 hour    Negative: SEVERE abdominal pain and age > 60    Negative: Bloody, black, or tarry bowel movements    Negative: SEVERE diarrhea (e.g., 7 or more times / day more than normal) and age > 60 years    Negative: Constant abdominal pain lasting > 2 hours    Negative: Drinking very little and has signs of dehydration (e.g., no urine > 12 hours, very dry mouth, very lightheaded)    Negative: Patient sounds very sick or weak to the triager    Negative: SEVERE diarrhea (e.g., 7 or more times / day more than normal) and present > 24 hours (1 day)    Negative: MODERATE diarrhea (e.g., 4-6 times / day more than normal) and present > 48 hours (2 days)    Negative: MODERATE diarrhea  (e.g., 4-6 times / day more than normal) and age > 70 years    Negative: Abdominal pain  (Exception: pain clears completely with each passage of diarrhea stool)    Negative: Fever > 101 F (38.3 C)    Negative: Blood in the stool    Negative: Mucus or pus in stool has been present > 2 days and diarrhea is more than mild    Negative: Weak immune system (e.g., HIV positive, cancer chemo, splenectomy, organ transplant, chronic steroids)    Negative: Travel to a foreign country in past month    Negative: Recent antibiotic therapy (i.e., within last 2 months) and diarrhea present > 3 days since antibiotic was stopped    Negative: Recent hospitalization and diarrhea present > 3 days    Negative: Tube feedings (e.g., nasogastric, g-tube, j-tube)    MILD diarrhea (e.g., 1-3 or more stools than normal in past 24 hours) diarrhea without known cause and present > 7 days    Patient wants to be seen    Protocols used: DIZZINESS-A-OH, DIARRHEA-A-OH

## 2019-06-03 NOTE — LETTER
[facility]  [id number] [place] [id number]  Phone: [phone]    Yahaira 3, 2019        Santa Kinsey  [address] [facility] [id number]          To whom it may concern:    RE: Santa Kinsey    Patient was seen and treated today at our clinic and missed work. Pt may return to work when feeling better and fevers have subsided.     Please contact me for questions or concerns.      Sincerely,        KALA Bravo CNP

## 2019-06-03 NOTE — PATIENT INSTRUCTIONS
Reassured that this appears to be a viral infection.  Treat symptoms with warm salt water gargles, throat lozenges, and over the counter pain, such as tylenol and ibuprofen, as needed.      For dizziness please increase fluids and rest, recommend Flonase 2 sprays once daily for sinus inflammation this is over the counter. If symptoms return return to clinic for further evaluation.     Your rapid strep was negative if your culture is positive will call you with treatment recommendations.     Thank you  Kristen White CNP

## 2019-06-04 LAB
BACTERIA SPEC CULT: NORMAL
SPECIMEN SOURCE: NORMAL

## 2019-06-04 ASSESSMENT — ANXIETY QUESTIONNAIRES: GAD7 TOTAL SCORE: 0

## 2019-06-04 ASSESSMENT — PATIENT HEALTH QUESTIONNAIRE - PHQ9: SUM OF ALL RESPONSES TO PHQ QUESTIONS 1-9: 0

## 2019-08-20 ENCOUNTER — E-VISIT (OUTPATIENT)
Dept: FAMILY MEDICINE | Facility: OTHER | Age: 44
End: 2019-08-20

## 2019-08-20 DIAGNOSIS — B37.31 CANDIDAL VULVOVAGINITIS: ICD-10-CM

## 2019-08-20 DIAGNOSIS — N89.8 VAGINAL DISCHARGE: ICD-10-CM

## 2019-08-20 DIAGNOSIS — N89.8 VAGINAL DISCHARGE: Primary | ICD-10-CM

## 2019-08-20 LAB
ALBUMIN UR-MCNC: NEGATIVE MG/DL
APPEARANCE UR: CLEAR
BILIRUB UR QL STRIP: NEGATIVE
COLOR UR AUTO: YELLOW
GLUCOSE UR STRIP-MCNC: NEGATIVE MG/DL
HGB UR QL STRIP: NEGATIVE
KETONES UR STRIP-MCNC: NEGATIVE MG/DL
LEUKOCYTE ESTERASE UR QL STRIP: NEGATIVE
NITRATE UR QL: NEGATIVE
PH UR STRIP: 6.5 PH (ref 5–7)
SOURCE: NORMAL
SP GR UR STRIP: 1.02 (ref 1–1.03)
SPECIMEN SOURCE: ABNORMAL
UROBILINOGEN UR STRIP-ACNC: 0.2 EU/DL (ref 0.2–1)
WET PREP SPEC: ABNORMAL

## 2019-08-20 PROCEDURE — 99444 ZZC PHYSICIAN ONLINE EVALUATION & MANAGEMENT SERVICE: CPT | Performed by: PHYSICIAN ASSISTANT

## 2019-08-20 PROCEDURE — 81003 URINALYSIS AUTO W/O SCOPE: CPT | Performed by: PHYSICIAN ASSISTANT

## 2019-08-20 PROCEDURE — 87210 SMEAR WET MOUNT SALINE/INK: CPT | Performed by: PHYSICIAN ASSISTANT

## 2019-08-20 RX ORDER — FLUCONAZOLE 150 MG/1
150 TABLET ORAL ONCE
Qty: 3 TABLET | Refills: 0 | Status: SHIPPED | OUTPATIENT
Start: 2019-08-20 | End: 2019-09-30

## 2019-08-20 NOTE — PATIENT INSTRUCTIONS
Yeast Infection (Candida Vaginal Infection)    You have a Candida vaginal infection. This is also known as a yeast infection. It is most often caused by a type of yeast (fungus) called Candida. Candida are normally found in the vagina. But if they increase in number, this can lead to infection and cause symptoms.  Symptoms of a yeast infection can include:    Clumpy or thin, white discharge, which may look like cottage cheese    Itching or burning    Burning with urination  Certain factors can make a yeast infection more likely. These can include:    Taking certain medicines, such as antibiotics or birth control pills    Pregnancy    Diabetes    Weak immune system  A yeast infection is most often treated with antifungal medicine. This may be given as a vaginal cream or pills you take by mouth. Treatment may last for about 1 to 7 days. Women with severe or recurrent infections may need longer courses of treatment.  Home care    If you re prescribed medicine, be sure to use it as directed. Finish all of the medicine, even if your symptoms go away. Note: Don t try to treat yourself using over-the-counter products without talking to your provider first. He or she will let you know if this is a good option for you.    Ask your provider what steps you can take to help reduce your risk of having a yeast infection in the future.  Follow-up care  Follow up with your healthcare provider, or as directed.  When to seek medical advice  Call your healthcare provider right away if:    You have a fever of 100.4 F (38 C) or higher, or as directed by your provider.    Your symptoms worsen, or they don t go away within a few days of starting treatment.    You have new pain in the lower belly or pelvic region.    You have side effects that bother you or a reaction to the cream or pills you re prescribed.    You or any partners you have sex with have new symptoms, such as a rash, joint pain, or sores.  Date Last Reviewed:  10/1/2017    2746-8242 [employer]. [address] [place] [address], [place] [address]. All rights reserved. This information is not intended as a substitute for professional medical care. Always follow your healthcare professional's instructions.

## 2019-09-30 ENCOUNTER — OFFICE VISIT (OUTPATIENT)
Dept: FAMILY MEDICINE | Facility: CLINIC | Age: 44
End: 2019-09-30
Payer: COMMERCIAL

## 2019-09-30 VITALS
BODY MASS INDEX: 20.11 KG/M2 | HEART RATE: 78 BPM | SYSTOLIC BLOOD PRESSURE: 112 MMHG | RESPIRATION RATE: 20 BRPM | TEMPERATURE: 98.4 F | OXYGEN SATURATION: 98 % | HEIGHT: 63 IN | WEIGHT: 113.5 LBS | DIASTOLIC BLOOD PRESSURE: 70 MMHG

## 2019-09-30 DIAGNOSIS — H61.22 IMPACTED CERUMEN OF LEFT EAR: ICD-10-CM

## 2019-09-30 DIAGNOSIS — J20.9 ACUTE BRONCHITIS, UNSPECIFIED ORGANISM: Primary | ICD-10-CM

## 2019-09-30 PROCEDURE — 69209 REMOVE IMPACTED EAR WAX UNI: CPT | Mod: LT | Performed by: FAMILY MEDICINE

## 2019-09-30 PROCEDURE — 99213 OFFICE O/P EST LOW 20 MIN: CPT | Mod: 25 | Performed by: FAMILY MEDICINE

## 2019-09-30 RX ORDER — AZITHROMYCIN 250 MG/1
TABLET, FILM COATED ORAL
Qty: 6 TABLET | Refills: 0 | Status: SHIPPED | OUTPATIENT
Start: 2019-09-30 | End: 2019-11-06

## 2019-09-30 RX ORDER — BENZONATATE 100 MG/1
100 CAPSULE ORAL 3 TIMES DAILY PRN
Qty: 15 CAPSULE | Refills: 0 | Status: SHIPPED | OUTPATIENT
Start: 2019-09-30 | End: 2020-01-02

## 2019-09-30 ASSESSMENT — MIFFLIN-ST. JEOR: SCORE: 1143.96

## 2019-09-30 NOTE — PATIENT INSTRUCTIONS
Santa,    It was great seeing you in clinic today.  I summarized our discussion and your plan below.  Please let me know if you have any questions or concerns.  Please follow up with me as discussed in clinic or sooner if any worsening or additional concerns.     1. Acute bronchitis, unspecified organism  44-year-old female with 5-day history of upper respiratory symptoms, no improvement despite conservative management at home.  Feels that it is getting worse and she is concerned about to work as soon as possible.  She has had multiple sick contacts.  We discussed this is possibly viral, however given her current timeline, will treat with azithromycin, Tessalon to assist with rest.  Recommended good rest, good hydration and healthy diet.  His pain improvement in the next 24 to 48 hours follow-up if any worsening or no improvement.  - azithromycin (ZITHROMAX) 250 MG tablet; Take 2 tablets (500 mg) by mouth daily for 1 day, THEN 1 tablet (250 mg) daily for 4 days.  Dispense: 6 tablet; Refill: 0  - benzonatate (TESSALON) 100 MG capsule; Take 1 capsule (100 mg) by mouth 3 times daily as needed for cough  Dispense: 15 capsule; Refill: 0    2. Impacted cerumen of left ear  Cerumen impaction of the left ear, ears were irrigated in the clinic.  Discussed not using Q-tips, discussed safe cleaning options.  We are able to remove all wax from the right ear, however there was some residual wax in the left ear, brains were normal.  Recommended over-the-counter Debrox.  - HC REMOVAL IMPACTED CERUMEN IRRIGATION/LVG UNILAT       Sincerely,  Dr. SIMON Caldwell MD, FAAFP  Family Medicine Physician  [facility]  [address] [place] [address]    Patient Education

## 2019-09-30 NOTE — NURSING NOTE
Patient identified using two patient identifiers.  Ear exam showing wax occlusion completed by provider.  Solution: warm water was placed in the bilateral ear(s) via syringe with flexable tip.    CLEMENTE Pelaez

## 2019-09-30 NOTE — PROGRESS NOTES
Julian Kinsey is a 44 year old female who presents to clinic today for the following health issues:    44-year-old female with cough, chills, sinus pressure worsening over the past 5 days, she is tried conservative management, however her symptoms continue to worsen.  She is missing work and that something she is unable to do at this time.  She has no nausea or vomiting, however some diarrhea mild.  She is able to take medications by mouth.  She has been exposed to several people who have been ill in her family.     Acute Illness   Acute illness concerns: cough  Onset: 5 days     Fever: no     Chills/Sweats: YES. Both.    Headache (location?): YES    Sinus Pressure:YES    Conjunctivitis:  no    Ear Pain: YES: left    Rhinorrhea: no     Congestion: YES    Sore Throat: YES     Cough: YES. Coughing     Wheeze: YES.     Decreased Appetite: YES    Nausea: no     Vomiting: no     Diarrhea:  YES. Ongoing for 1 month.     Dysuria/Freq.: Frequent urination.     Fatigue/Achiness: YES.both.     Sick/Strep Exposure: YES. Kids and roomates are sick.      Therapies Tried and outcome: Mucinex , Tylenol  ,  and Ibuprofen.     Patient Active Problem List   Diagnosis     Encounter for long-term current use of medication     Hyperlipidemia LDL goal <160     Anxiety     Encounter for smoking cessation counseling     Tobacco use disorder     Plantar verruca     ASCUS of cervix with negative high risk HPV     Intractable migraine without aura and with status migrainosus     Past Surgical History:   Procedure Laterality Date     C APPENDECTOMY         Social History     Tobacco Use     Smoking status: Former Smoker     Packs/day: 0.75     Years: 8.00     Pack years: 6.00     Types: Cigarettes     Smokeless tobacco: Never Used     Tobacco comment: smokes only when stressed out    Substance Use Topics     Alcohol use: No     Family History   Problem Relation Age of Onset     Cancer Paternal Grandfather      Breast Cancer  "Paternal Aunt      Seizure Disorder Son          Current Outpatient Medications   Medication Sig Dispense Refill     azithromycin (ZITHROMAX) 250 MG tablet Take 2 tablets (500 mg) by mouth daily for 1 day, THEN 1 tablet (250 mg) daily for 4 days. 6 tablet 0     benzonatate (TESSALON) 100 MG capsule Take 1 capsule (100 mg) by mouth 3 times daily as needed for cough 15 capsule 0     IBUPROFEN PO        Allergies   Allergen Reactions     No Known Drug Allergies      Recent Labs   Lab Test 10/21/11  1507   TSH 1.63      BP Readings from Last 3 Encounters:   09/30/19 112/70   06/03/19 118/70   07/26/18 104/62    Wt Readings from Last 3 Encounters:   09/30/19 51.5 kg (113 lb 8 oz)   06/03/19 54 kg (119 lb)   07/26/18 53.8 kg (118 lb 8 oz)                    Reviewed and updated as needed this visit by Provider         Review of Systems   ROS COMP: Constitutional, HEENT, cardiovascular, pulmonary, gi and gu systems are negative, except as otherwise noted.      Objective    /70   Pulse 78   Temp 98.4  F (36.9  C) (Temporal)   Resp 20   Ht 1.6 m (5' 3\")   Wt 51.5 kg (113 lb 8 oz)   LMP 09/28/2019 (Exact Date)   SpO2 98%   Breastfeeding? No   BMI 20.11 kg/m    Body mass index is 20.11 kg/m .  Physical Exam   GENERAL: alert, no distress and ill-appearing  EYES: Eyes grossly normal to inspection, PERRL and conjunctivae and sclerae normal  HENT: ear canals and TM's normal, nose and mouth without ulcers or lesions, throat without erythema or exudate  NECK: no adenopathy, no asymmetry, masses, or scars and thyroid normal to palpation  RESP: lungs clear to auscultation - no rales, rhonchi or wheezes  CV: regular rate and rhythm, normal S1 S2, no S3 or S4, no murmur, click or rub, no peripheral edema and peripheral pulses strong  ABDOMEN: soft, nontender, no hepatosplenomegaly, no masses and bowel sounds normal  MS: no gross musculoskeletal defects noted, no edema  NEURO: Normal strength and tone, mentation intact and " speech normal  BACK: no CVA tenderness, no paralumbar tenderness  PSYCH: mentation appears normal, affect normal/bright    Diagnostic Test Results:  Labs reviewed in Epic        ASSESSMENT and PLAN  1. Acute bronchitis, unspecified organism  44-year-old female with 5-day history of upper respiratory symptoms, no improvement despite conservative management at home.  Feels that it is getting worse and she is concerned about to work as soon as possible.  She has had multiple sick contacts.  We discussed this is possibly viral, however given her current timeline, will treat with azithromycin, Tessalon to assist with rest.  Recommended good rest, good hydration and healthy diet.  His pain improvement in the next 24 to 48 hours follow-up if any worsening or no improvement.  - azithromycin (ZITHROMAX) 250 MG tablet; Take 2 tablets (500 mg) by mouth daily for 1 day, THEN 1 tablet (250 mg) daily for 4 days.  Dispense: 6 tablet; Refill: 0  - benzonatate (TESSALON) 100 MG capsule; Take 1 capsule (100 mg) by mouth 3 times daily as needed for cough  Dispense: 15 capsule; Refill: 0    2. Impacted cerumen of left ear  Cerumen impaction of the left ear, ears were irrigated in the clinic.  Discussed not using Q-tips, discussed safe cleaning options.  We are able to remove all wax from the right ear, however there was some residual wax in the left ear, brains were normal.  Recommended over-the-counter Debrox.  - HC REMOVAL IMPACTED CERUMEN IRRIGATION/LVG UNILAT    Return in about 2 weeks (around 10/14/2019) for Annual Well Check and flu vaccination.     Julian Caldwell MD, FAAFP  Family Medicine Physician  [facility]  [address] [place] [address]

## 2019-10-27 ENCOUNTER — HEALTH MAINTENANCE LETTER (OUTPATIENT)
Age: 44
End: 2019-10-27

## 2019-11-05 NOTE — PROGRESS NOTES
Julian Kinsey is a 44 year old female who presents to clinic today for the following health issues:    HPI   Vaginal Symptoms  Onset: about 2 wks    Description:  Vaginal Discharge: white   Itching (Pruritis): no   Burning sensation:  no   Odor: no     Accompanying Signs & Symptoms:  Pain with Urination: no   Abdominal Pain: YES  Fever: no     History:   Sexually active: YES  New Partner: no   Possibility of Pregnancy:  No    Precipitating factors:   Recent Antibiotic Use: none    Alleviating factors:  ibuprofen    Therapies Tried and outcome: see above    Patient Active Problem List   Diagnosis     Encounter for long-term current use of medication     Hyperlipidemia LDL goal <160     Anxiety     Encounter for smoking cessation counseling     Tobacco use disorder     Plantar verruca     ASCUS of cervix with negative high risk HPV     Intractable migraine without aura and with status migrainosus     Past Surgical History:   Procedure Laterality Date     C APPENDECTOMY         Social History     Tobacco Use     Smoking status: Former Smoker     Packs/day: 0.75     Years: 8.00     Pack years: 6.00     Types: Cigarettes     Smokeless tobacco: Never Used     Tobacco comment: smokes only when stressed out    Substance Use Topics     Alcohol use: No     Family History   Problem Relation Age of Onset     Cancer Paternal Grandfather      Breast Cancer Paternal Aunt      Seizure Disorder Son          Current Outpatient Medications   Medication Sig Dispense Refill     IBUPROFEN PO        benzonatate (TESSALON) 100 MG capsule Take 1 capsule (100 mg) by mouth 3 times daily as needed for cough (Patient not taking: Reported on 11/6/2019) 15 capsule 0     Allergies   Allergen Reactions     No Known Drug Allergies      Recent Labs   Lab Test 10/21/11  1507   TSH 1.63      BP Readings from Last 3 Encounters:   11/06/19 96/62   09/30/19 112/70   06/03/19 118/70    Wt Readings from Last 3 Encounters:   11/06/19 52 kg  (114 lb 11.2 oz)   09/30/19 51.5 kg (113 lb 8 oz)   06/03/19 54 kg (119 lb)                    Reviewed and updated as needed this visit by Provider         Review of Systems   ROS COMP: Constitutional, HEENT, cardiovascular, pulmonary, gi and gu systems are negative, except as otherwise noted.      Objective    There were no vitals taken for this visit.  There is no height or weight on file to calculate BMI.  Physical Exam   GENERAL: healthy, alert and no distress  ABDOMEN: soft, nontender, no hepatosplenomegaly, no masses and bowel sounds normal   (female): normal female external genitalia, normal urethral meatus, vaginal mucosa pink, moist, well rugated, and normal cervix/adnexa/uterus without masses or discharge   (female): normal female external genitalia, normal urethral meatus , vaginal mucosa pink, moist, well rugated, normal cervix, adnexae, and uterus without masses. and normal vaginal exam, normal bimanual exam.  She does have a small papule on her left labia, she shaves frequently, papule appears to be mild folliculitis.  Recommended warm compresses, no blade shaving in that area.  Follow-up if that papule worsens and ulcerates.  Does not have a history of HSV  MS: no gross musculoskeletal defects noted, no edema  NEURO: Normal strength and tone, mentation intact and speech normal  PSYCH: mentation appears normal, affect normal/bright    Diagnostic Test Results:  Labs reviewed in Epic  Pregnancy test negative, KOH and wet prep negative        ASSESSMENT and PLAN  1. Vaginal discharge  44-year-old female here with complaint of vaginal discharge.  Wet prep and KOH were negative.  Examination was normal.  Normal-appearing discharge, scant, not malodorous.  She does have a history of BV in the past, we discussed techniques to keep this from recurring.  Encouraged her to have her partner wash prior to intercourse.  - Wet prep  - *UA reflex to Microscopic and Culture ([place] [facility] (except  [place] and [place])  - NEISSERIA GONORRHOEA PCR  - CHLAMYDIA TRACHOMATIS PCR  - HCG qualitative urine    2. Pelvic pain in female  Patient had 2 episodes of sharp lower abdomen pelvic pain over the past 4 days.  Those episodes last a mere seconds and were fleeting.  No current pain.  Bimanual exam was normal.  Pregnancy test was negative.  Recommend follow-up if any recurrence.         Return in about 4 weeks (around 12/4/2019) for Annual Well Check.     Julian Caldwell MD, FAAFP  Family Medicine Physician  [facility] [place]  [address] [place] [address]

## 2019-11-06 ENCOUNTER — OFFICE VISIT (OUTPATIENT)
Dept: FAMILY MEDICINE | Facility: CLINIC | Age: 44
End: 2019-11-06
Payer: COMMERCIAL

## 2019-11-06 VITALS
HEART RATE: 64 BPM | HEIGHT: 63 IN | SYSTOLIC BLOOD PRESSURE: 96 MMHG | RESPIRATION RATE: 16 BRPM | OXYGEN SATURATION: 100 % | TEMPERATURE: 98.2 F | DIASTOLIC BLOOD PRESSURE: 62 MMHG | WEIGHT: 114.7 LBS | BODY MASS INDEX: 20.32 KG/M2

## 2019-11-06 DIAGNOSIS — R10.2 PELVIC PAIN IN FEMALE: ICD-10-CM

## 2019-11-06 DIAGNOSIS — N89.8 VAGINAL DISCHARGE: Primary | ICD-10-CM

## 2019-11-06 LAB
HCG UR QL: NEGATIVE
SPECIMEN SOURCE: NORMAL
WET PREP SPEC: NORMAL

## 2019-11-06 PROCEDURE — 81003 URINALYSIS AUTO W/O SCOPE: CPT | Performed by: FAMILY MEDICINE

## 2019-11-06 PROCEDURE — 87491 CHLMYD TRACH DNA AMP PROBE: CPT | Performed by: FAMILY MEDICINE

## 2019-11-06 PROCEDURE — 87591 N.GONORRHOEAE DNA AMP PROB: CPT | Performed by: FAMILY MEDICINE

## 2019-11-06 PROCEDURE — 99214 OFFICE O/P EST MOD 30 MIN: CPT | Performed by: FAMILY MEDICINE

## 2019-11-06 PROCEDURE — 87210 SMEAR WET MOUNT SALINE/INK: CPT | Performed by: FAMILY MEDICINE

## 2019-11-06 PROCEDURE — 81015 MICROSCOPIC EXAM OF URINE: CPT | Performed by: FAMILY MEDICINE

## 2019-11-06 PROCEDURE — 81025 URINE PREGNANCY TEST: CPT | Performed by: FAMILY MEDICINE

## 2019-11-06 ASSESSMENT — ANXIETY QUESTIONNAIRES
GAD7 TOTAL SCORE: 5
6. BECOMING EASILY ANNOYED OR IRRITABLE: SEVERAL DAYS
7. FEELING AFRAID AS IF SOMETHING AWFUL MIGHT HAPPEN: NOT AT ALL
2. NOT BEING ABLE TO STOP OR CONTROL WORRYING: SEVERAL DAYS
1. FEELING NERVOUS, ANXIOUS, OR ON EDGE: NOT AT ALL
GAD7 TOTAL SCORE: 5
3. WORRYING TOO MUCH ABOUT DIFFERENT THINGS: SEVERAL DAYS
4. TROUBLE RELAXING: SEVERAL DAYS
5. BEING SO RESTLESS THAT IT IS HARD TO SIT STILL: SEVERAL DAYS
7. FEELING AFRAID AS IF SOMETHING AWFUL MIGHT HAPPEN: NOT AT ALL
GAD7 TOTAL SCORE: 5

## 2019-11-06 ASSESSMENT — PATIENT HEALTH QUESTIONNAIRE - PHQ9
10. IF YOU CHECKED OFF ANY PROBLEMS, HOW DIFFICULT HAVE THESE PROBLEMS MADE IT FOR YOU TO DO YOUR WORK, TAKE CARE OF THINGS AT HOME, OR GET ALONG WITH OTHER PEOPLE: NOT DIFFICULT AT ALL
SUM OF ALL RESPONSES TO PHQ QUESTIONS 1-9: 4
SUM OF ALL RESPONSES TO PHQ QUESTIONS 1-9: 4

## 2019-11-06 ASSESSMENT — PAIN SCALES - GENERAL: PAINLEVEL: MODERATE PAIN (5)

## 2019-11-06 ASSESSMENT — MIFFLIN-ST. JEOR: SCORE: 1145.44

## 2019-11-06 NOTE — PATIENT INSTRUCTIONS
Santa,    It was great seeing you in clinic today.  I summarized our discussion and your plan below.  Please let me know if you have any questions or concerns.  Please follow up with me as discussed in clinic or sooner if any worsening or additional concerns.     1. Vaginal discharge  44-year-old female here with complaint of vaginal discharge.  Wet prep and KOH were negative.  Examination was normal.  Normal-appearing discharge, scant, not malodorous.  She does have a history of BV in the past, we discussed techniques to keep this from recurring.  Encouraged her to have her partner wash prior to intercourse.  - Wet prep  - *UA reflex to Microscopic and Culture ([place] and [facility] (except [place] and [place])  - NEISSERIA GONORRHOEA PCR  - CHLAMYDIA TRACHOMATIS PCR  - HCG qualitative urine    2. Pelvic pain in female  Patient had 2 episodes of sharp lower abdomen pelvic pain over the past 4 days.  Those episodes last a mere seconds and were fleeting.  No current pain.  Bimanual exam was normal.  Pregnancy test was negative.  Recommend follow-up if any recurrence.       Sincerely,  Dr. SIMON Caldwell MD, FAAFP  Family Medicine Physician  [facility] [place]  [address] [place] [address]    Patient Education

## 2019-11-07 LAB
ALBUMIN UR-MCNC: NEGATIVE MG/DL
APPEARANCE UR: CLEAR
BILIRUB UR QL STRIP: NEGATIVE
COLOR UR AUTO: YELLOW
GLUCOSE UR STRIP-MCNC: NEGATIVE MG/DL
HGB UR QL STRIP: ABNORMAL
KETONES UR STRIP-MCNC: NEGATIVE MG/DL
LEUKOCYTE ESTERASE UR QL STRIP: NEGATIVE
NITRATE UR QL: NEGATIVE
PH UR STRIP: 6.5 PH (ref 5–7)
RBC #/AREA URNS AUTO: NORMAL /HPF
SOURCE: ABNORMAL
SP GR UR STRIP: 1.02 (ref 1–1.03)
UROBILINOGEN UR STRIP-ACNC: 0.2 EU/DL (ref 0.2–1)
WBC #/AREA URNS AUTO: NORMAL /HPF

## 2019-11-07 ASSESSMENT — PATIENT HEALTH QUESTIONNAIRE - PHQ9: SUM OF ALL RESPONSES TO PHQ QUESTIONS 1-9: 4

## 2019-11-07 ASSESSMENT — ANXIETY QUESTIONNAIRES: GAD7 TOTAL SCORE: 5

## 2019-11-11 NOTE — RESULT ENCOUNTER NOTE
Santa,  Your recent studies were all normal or unremarkable.  Urinalysis did show some trace blood however microscopic evaluation of the urine was normal without any blood cells.  Please let me know if you have any questions or concerns and follow up as discussed in clinic.    Sincerely.  Dr. SIMON Caldwell MD, FAAFP  Family Medicine Physician  [facility] [place]  [id number] [place] [id number]

## 2019-11-25 ENCOUNTER — MYC MEDICAL ADVICE (OUTPATIENT)
Dept: FAMILY MEDICINE | Facility: CLINIC | Age: 44
End: 2019-11-25

## 2019-11-25 DIAGNOSIS — M89.8X0 EXOSTOSES, MULTIPLE: ICD-10-CM

## 2019-11-25 DIAGNOSIS — B96.89 BACTERIAL VAGINOSIS: Primary | ICD-10-CM

## 2019-11-25 DIAGNOSIS — N89.8 VAGINAL DISCHARGE: Primary | ICD-10-CM

## 2019-11-25 DIAGNOSIS — N76.0 BACTERIAL VAGINOSIS: Primary | ICD-10-CM

## 2019-11-25 LAB
SPECIMEN SOURCE: ABNORMAL
WET PREP SPEC: ABNORMAL

## 2019-11-25 PROCEDURE — 87210 SMEAR WET MOUNT SALINE/INK: CPT | Performed by: NURSE PRACTITIONER

## 2019-11-25 RX ORDER — METRONIDAZOLE 500 MG/1
500 TABLET ORAL 2 TIMES DAILY
Qty: 14 TABLET | Refills: 0 | Status: SHIPPED | OUTPATIENT
Start: 2019-11-25 | End: 2019-12-02

## 2019-12-01 ENCOUNTER — MYC MEDICAL ADVICE (OUTPATIENT)
Dept: FAMILY MEDICINE | Facility: CLINIC | Age: 44
End: 2019-12-01

## 2019-12-10 ENCOUNTER — OFFICE VISIT (OUTPATIENT)
Dept: URGENT CARE | Facility: RETAIL CLINIC | Age: 44
End: 2019-12-10
Payer: COMMERCIAL

## 2019-12-10 VITALS
DIASTOLIC BLOOD PRESSURE: 76 MMHG | HEART RATE: 61 BPM | RESPIRATION RATE: 16 BRPM | OXYGEN SATURATION: 99 % | SYSTOLIC BLOOD PRESSURE: 120 MMHG | TEMPERATURE: 98.6 F

## 2019-12-10 DIAGNOSIS — L29.9 ITCHING: Primary | ICD-10-CM

## 2019-12-10 PROCEDURE — 99213 OFFICE O/P EST LOW 20 MIN: CPT | Performed by: PHYSICIAN ASSISTANT

## 2019-12-10 RX ORDER — PREDNISONE 20 MG/1
TABLET ORAL
Qty: 20 TABLET | Refills: 0 | Status: SHIPPED | OUTPATIENT
Start: 2019-12-10 | End: 2020-01-02

## 2019-12-10 ASSESSMENT — ENCOUNTER SYMPTOMS
CHOKING: 0
EYE DISCHARGE: 0
SORE THROAT: 1
ADENOPATHY: 0
STRIDOR: 0
FATIGUE: 0
ARTHRALGIAS: 0
SINUS PRESSURE: 0
HEADACHES: 0
FEVER: 0
HEARTBURN: 1
DIAPHORESIS: 0
SINUS PAIN: 0
VOMITING: 0
SHORTNESS OF BREATH: 0
EYE ITCHING: 0
EYE REDNESS: 0
JOINT SWELLING: 0
WHEEZING: 0
MYALGIAS: 0
COUGH: 1
NAUSEA: 0
CHILLS: 0

## 2019-12-11 NOTE — PATIENT INSTRUCTIONS
Take Zyrtec (cetirizine) every morning and benadryl (diaphenhydramine) every evening.  Prednisone on hold at pharmacy. Fill if no improvement tomorrow evening.  Eucerin cream to whole body daily, especially right after bathing.  Avoid lotions with a scent as these can be irritating.  You are not contagious.  Cool compresses, ice packs, cooler showers for itching relief.  Baking soda paste, calamine lotion or aveeno oatmeal packs for itching.  Rub with affected are with ice cube.  Avoid sunlight and heat.  Avoid scratching to prevent secondary infection.  Watch for any signs of infection - (fever, bright red color, hot to the touch, firm to the touch,  more pain and/or discharge that is cream- yellow/white/green) and follow up if you notice these as an antibiotic may be needed.

## 2019-12-11 NOTE — PROGRESS NOTES
"Chief Complaint   Patient presents with     Pharyngitis     X 3 days      SUBJECTIVE:  Santa Kinsey is a 44 year old female who presents to the clinic today for evaluation of \"itching everywhere\" and a sore throat. She states the itching started 3 days ago. It has been on her scalp, back, neck and bilateral arms. It is not constant but will suddenly come on. She has not had a rash. She states she has been under a lot of stress recently. \"I have 15 year old twin boys and I'm a single mom.\" She also has a sore throat with minimal nasal congestion and a cough. Throat feels like there's something stuck in it. She had mild heart burn last night and took a Tums and felt better.  Recent exposure history: none known  Patient denies new meds, pets, foods, soaps, detergents, lotions, or enviornmental contacts.    Past Medical History:   Diagnosis Date     ASCUS favoring benign 8/19/15    neg HPV     Depressive disorder, not elsewhere classified      Mild major depression (H) 2011     Other, mixed, or unspecified nondependent drug abuse, unspecified     Drug /ETOH abuse (non-depend.)     benzonatate (TESSALON) 100 MG capsule, Take 1 capsule (100 mg) by mouth 3 times daily as needed for cough (Patient not taking: Reported on 2019)  IBUPROFEN PO,   [] metroNIDAZOLE (FLAGYL) 500 MG tablet, Take 1 tablet (500 mg) by mouth 2 times daily for 7 days    No current facility-administered medications on file prior to visit.     Social History     Tobacco Use     Smoking status: Former Smoker     Packs/day: 0.75     Years: 8.00     Pack years: 6.00     Types: Cigarettes     Smokeless tobacco: Never Used     Tobacco comment: smokes only when stressed out    Substance Use Topics     Alcohol use: No     Allergies   Allergen Reactions     No Known Drug Allergies      Review of Systems   Constitutional: Negative for chills, diaphoresis, fatigue and fever.   HENT: Positive for congestion (minimal) and sore throat (minimal). " Negative for mouth sores, sinus pressure, sinus pain and sneezing.    Eyes: Negative for discharge, redness and itching.   Respiratory: Positive for cough. Negative for choking, shortness of breath, wheezing and stridor.    Gastrointestinal: Positive for heartburn. Negative for nausea and vomiting.   Musculoskeletal: Negative for arthralgias, joint swelling and myalgias.   Skin: Negative for pallor and rash (itching).   Neurological: Negative for headaches.   Hematological: Negative for adenopathy.     EXAM:   /76 (BP Location: Left arm, Patient Position: Sitting, Cuff Size: Adult Regular)   Pulse 61   Temp 98.6  F (37  C) (Oral)   Resp 16   SpO2 99%   GENERAL APPEARANCE: healthy, alert and no distress  RESP: lungs clear to auscultation - no rales, rhonchi or wheezes  CV: regular rates and rhythm, normal S1 S2, no murmur noted  SKIN: left anterior neck with erythematous streaks from scratching. Few elevated flesh colored papules. Remainder of scalp, neck, back and arms are clear. No hives, no insect bites, no excoriations.    ASSESSMENT:    ICD-10-CM    1. Itching L29.9 predniSONE (DELTASONE) 20 MG tablet     PLAN:  Patient Instructions   Take Zyrtec (cetirizine) every morning and benadryl (diaphenhydramine) every evening.  Prednisone on hold at pharmacy. Fill if no improvement tomorrow evening.  Eucerin cream to whole body daily, especially right after bathing.  Avoid lotions with a scent as these can be irritating.  You are not contagious.  Cool compresses, ice packs, cooler showers for itching relief.  Baking soda paste, calamine lotion or aveeno oatmeal packs for itching.  Rub with affected are with ice cube.  Avoid sunlight and heat.  Avoid scratching to prevent secondary infection.  Watch for any signs of infection - (fever, bright red color, hot to the touch, firm to the touch,  more pain and/or discharge that is cream- yellow/white/green) and follow up if you notice these as an antibiotic may be  needed.      Follow up with primary care provider with any problems, questions or concerns or if symptoms worsen or fail to improve. Patient agreed to plan and verbalized understanding.    Elvira Downey PA-C  [facility]

## 2019-12-23 ENCOUNTER — MYC MEDICAL ADVICE (OUTPATIENT)
Dept: FAMILY MEDICINE | Facility: CLINIC | Age: 44
End: 2019-12-23

## 2019-12-30 NOTE — PROGRESS NOTES
Julian Kinsey is a 44 year old female who presents to clinic today for the following health issues:    History of Present Illness        She eats 2-3 servings of fruits and vegetables daily.She consumes 5 sweetened beverage(s) daily.  She is taking medications regularly.     GERD/Heartburn  Onset: about 3wks    Description:     Burning in chest: no     Intensity: 8/10    Progression of Symptoms: intermittent    Accompanying Signs & Symptoms:  Does it feel like food gets stuck: YES  Nausea: no   Vomiting (bloody?): no   Abdominal Pain: no   Black-Tarry stools: no :  Bloody stools: no     History:   Previous ulcers: no     Precipitating factors:   Caffeine use: no  Alcohol use: no   NSAID/Aspirin use: no  Tobacco use: no  Worse with nothing.    Alleviating factors:  Deep breathing and staying calm    Therapies Tried and outcome:chewable antacid and Tums    Santa presents today with proximally 3 weeks of mid and left-sided chest pain.  It is worse in the nighttime compared to the daytime.  It is not worse with exertion.  She does not notice any increase in symptoms with food or other triggers.  The first time it occurred she rated as a 10 on 10.  Is intermittent and deep breathing, staying calm, time tends to lock the past.  She has not noticed any black or tarry stools.  She has not noticed any blood in her stools.  She denies any nausea or vomiting.  It does radiate up her throat.  She denies it radiating into her jaw or either arm.    It does not feel like her typical heartburn.    Symptoms did improve on GI cocktail given today.    Patient does smoke approximately half pack per day.  She is not currently interested in quitting.  She previously quit by vaping for 1 month and then cold turkey.  She has tried lozenges, nicotine gum, and other methods in the past.    Patient Active Problem List   Diagnosis     Encounter for long-term current use of medication     Hyperlipidemia LDL goal <160      "Anxiety     Encounter for smoking cessation counseling     Tobacco use disorder     Plantar verruca     ASCUS of cervix with negative high risk HPV     Intractable migraine without aura and with status migrainosus     Past Surgical History:   Procedure Laterality Date     C APPENDECTOMY         Social History     Tobacco Use     Smoking status: Former Smoker     Packs/day: 0.75     Years: 8.00     Pack years: 6.00     Types: Cigarettes     Smokeless tobacco: Never Used     Tobacco comment: smokes only when stressed out    Substance Use Topics     Alcohol use: No     Family History   Problem Relation Age of Onset     Cancer Paternal Grandfather      Breast Cancer Paternal Aunt      Seizure Disorder Son          Current Outpatient Medications   Medication Sig Dispense Refill     famotidine (PEPCID) 10 MG tablet Take 1 tablet (10 mg) by mouth 2 times daily 60 tablet 0     IBUPROFEN PO        Allergies   Allergen Reactions     No Known Drug Allergies      Reviewed and updated as needed this visit by Provider  Tobacco  Allergies  Meds  Problems  Med Hx  Surg Hx  Fam Hx         Review of Systems   ROS COMP: Constitutional, HEENT, lymph, cardiovascular, pulmonary, gi and gu systems are negative, except as otherwise noted.      Objective    /78   Pulse 76   Temp 98.3  F (36.8  C) (Temporal)   Resp 18   Ht 1.6 m (5' 3\")   Wt 53.5 kg (118 lb)   LMP 12/28/2019 (Approximate)   SpO2 99%   BMI 20.90 kg/m    Body mass index is 20.9 kg/m .  Physical Exam   General: Appears well and in no acute distress.  Cardiovascular: Reproducible pain to palpation over sternum.  Regular rate and rhythm, normal S1 and S2 without murmur. No extra heartsounds or friction rub. Radial pulses present and equal bilaterally.  Respiratory: Lungs clear to auscultation bilaterally. No wheezing or crackles. No prolonged expiration. Symmetrical chest rise.  GI/Rectal: Soft, non-tender abdomen. No hepatosplenomegaly. Normal active bowel " sounds.   Musculoskeletal: No gross extremity deformities. No peripheral edema. Normal muscle bulk.     Diagnostic Test Results:  EKG -normal sinus rhythm.  Leftward axis indicating either prior old inferior infarct versus left fascicular block.  Normal precordial lead progression.  No obvious ST segment or T wave changes concerning for acute ischemia.        Assessment & Plan    1. Chest wall pain: Reproducible pain to palpation on exam, pain improved with GI cocktail.  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  Patient is a smoker but has no other risk factors.  Differential includes most likely costochondritis given reproducibility on exam; however, improvement with GI cocktail also indicates gastritis.  Will check H. pylori stool antigen and start Pepcid twice daily 10 mg.  Tylenol for pain.  Avoid NSAIDs, alcohol use, chocolate, fatty foods, and caffeine.  Recommend stress test for further evaluation given abnormal EKG.  She is having symptoms currently; however, no EKG changes making ACS unlikely.  Additionally symptoms do not worsen with exertion/exercise.  - EKG 12-lead complete w/read - Clinics  - lidocaine (XYLOCAINE) 2 % 15 mL, alum & mag hydroxide-simethicone (MYLANTA ES/MAALOX  ES) 15 mL GI Cocktail  - famotidine (PEPCID) 10 MG tablet; Take 1 tablet (10 mg) by mouth 2 times daily  Dispense: 60 tablet; Refill: 0  - Helicobacter pylori Antigen Stool; Future  - Echocardiogram Exercise Stress; Future    2. Influenza vaccination declined    3. Tobacco use disorder: Patient has reduced her smoking.  She states it is her stress relief.  She states she would try to quit once her kids moved out of the house.  She is successfully quit for approximately 1 year in the past.  Discussed options for smoking cessation as well as recommending alternative stress reduction habit.  Dedicated counseling took approximately 4 minutes.  - SMOKING CESSATION COUNSELING 3-10 MIN      Return in about 2 weeks (around 1/16/2020), or if symptoms worsen or fail to improve.    Jared Maharaj MD  [facility]     This chart is completed utilizing dictation software; typos and/or incorrect word substitutions may unintentionally occur.

## 2020-01-02 ENCOUNTER — OFFICE VISIT (OUTPATIENT)
Dept: FAMILY MEDICINE | Facility: CLINIC | Age: 45
End: 2020-01-02
Payer: COMMERCIAL

## 2020-01-02 VITALS
BODY MASS INDEX: 20.91 KG/M2 | DIASTOLIC BLOOD PRESSURE: 78 MMHG | HEIGHT: 63 IN | WEIGHT: 118 LBS | RESPIRATION RATE: 18 BRPM | OXYGEN SATURATION: 99 % | HEART RATE: 76 BPM | TEMPERATURE: 98.3 F | SYSTOLIC BLOOD PRESSURE: 102 MMHG

## 2020-01-02 DIAGNOSIS — R07.89 CHEST WALL PAIN: Primary | ICD-10-CM

## 2020-01-02 DIAGNOSIS — Z28.21 INFLUENZA VACCINATION DECLINED: ICD-10-CM

## 2020-01-02 DIAGNOSIS — F17.200 TOBACCO USE DISORDER: ICD-10-CM

## 2020-01-02 PROCEDURE — 99406 BEHAV CHNG SMOKING 3-10 MIN: CPT | Performed by: FAMILY MEDICINE

## 2020-01-02 PROCEDURE — 99214 OFFICE O/P EST MOD 30 MIN: CPT | Performed by: FAMILY MEDICINE

## 2020-01-02 PROCEDURE — 93000 ELECTROCARDIOGRAM COMPLETE: CPT | Performed by: FAMILY MEDICINE

## 2020-01-02 RX ORDER — FAMOTIDINE 10 MG
10 TABLET ORAL 2 TIMES DAILY
Qty: 60 TABLET | Refills: 0 | Status: SHIPPED | OUTPATIENT
Start: 2020-01-02 | End: 2021-02-03

## 2020-01-02 ASSESSMENT — PAIN SCALES - GENERAL: PAINLEVEL: EXTREME PAIN (8)

## 2020-01-02 ASSESSMENT — MIFFLIN-ST. JEOR: SCORE: 1164.37

## 2020-01-02 NOTE — PATIENT INSTRUCTIONS
Important Takeaway Points From This Visit:    I would like you to use Pepcid twice daily for 1 month. Let me know how your symptoms are doing.    Please bring in a stool sample when able for the bacteria test.    Avoid NSAID medications (aleve, ibuprofen, naproxen) as well as caffeine.    Consider alternative forms of stress reduction other than smoking.     When you are feeling better you are due for a physical    If you notice your symptoms are worse on exertion/exercise I would want to know right away.    I would like you to have a stress test in the near future.      As always, please call with any questions or concerns. I look forward to seeing you again soon!    Take care,  Dr. Maharaj    Your current medication list is printed. Please keep this with you - it is helpful to bring this current list to any other medical appointments. It can also be helpful if you ever go to the emergency room or hospital.    If you had lab testing today we will call you with the results. The phone number we will call with your results is # [phone] (home) . If this is not the best number please call our clinic and change the number.    If you need any refills, please call your pharmacy and they will contact us.    If you have any further concerns or wish to schedule another appointment, please call our office at [phone].    If you have a medical emergency, please call 175.    Thank you for coming to [facility]!

## 2020-01-06 DIAGNOSIS — R07.89 CHEST WALL PAIN: ICD-10-CM

## 2020-01-06 PROCEDURE — 87338 HPYLORI STOOL AG IA: CPT | Performed by: FAMILY MEDICINE

## 2020-01-08 ENCOUNTER — TELEPHONE (OUTPATIENT)
Dept: FAMILY MEDICINE | Facility: CLINIC | Age: 45
End: 2020-01-08

## 2020-01-08 LAB — H PYLORI AG STL QL IA: NEGATIVE

## 2020-01-08 NOTE — TELEPHONE ENCOUNTER
Left message asking patient to return call.  Please inform patient of RESULTS from Provider below.     Your lab results came back normal. If your symptoms are not improved you may try over the counter pepcid or prilosec for heartburn symptoms.     Please call the clinic with any questions you may have.     Have a great day,     Dr. Denny

## 2020-01-08 NOTE — LETTER
[facility]  [address], [address]  [place] [address]  [phone]      January 9, 2020    Santa Kinsey                                                                                                                     [address] [address]        Dear Santa,    Your lab results came back normal. If your symptoms are not improved you may try over the counter pepcid or prilosec for heartburn symptoms.     Please call the clinic with any questions you may have.     Have a great day,     Dr. Denny     Component      Latest Ref Rng & Units 1/6/2020   Helicobacter pylori Antigen Stool      NEG:Negative Negative

## 2020-09-13 NOTE — PROGRESS NOTES
SUBJECTIVE:   CC: Santa Kinsey is an 45 year old woman who presents for preventive health visit.     Healthy Habits:     Getting at least 3 servings of Calcium per day:  Yes    Bi-annual eye exam:  NO    Dental care twice a year:  Yes    Sleep apnea or symptoms of sleep apnea:  None    Diet:  Regular (no restrictions)    Frequency of exercise:  None    Taking medications regularly:  Yes    Medication side effects:  None    PHQ-2 Total Score: 0    Additional concerns today:  No          Vaginal Symptoms  Onset: 1 week    Description:  Vaginal Discharge: white   Itching (Pruritis): no   Burning sensation:  no   Odor: no     Accompanying Signs & Symptoms:  Pain with Urination: YES  Abdominal Pain: YES  Fever: no     History:   Sexually active: YES  New Partner: no   Possibility of Pregnancy:  No    Precipitating factors:   Recent Antibiotic Use: no     Alleviating factors:  none    Therapies Tried and outcome: noneAnswers for HPI/ROS submitted by the patient on 9/18/2020   Annual Exam:  If you checked off any problems, how difficult have these problems made it for you to do your work, take care of things at home, or get along with other people?: Not difficult at all  PHQ9 TOTAL SCORE: 2  TRACEY 7 TOTAL SCORE: 3      Today's PHQ-2 Score:   PHQ-2 ( 1999 [employer]) 9/18/2020   Q1: Little interest or pleasure in doing things 0   Q2: Feeling down, depressed or hopeless 0   PHQ-2 Score 0   Q1: Little interest or pleasure in doing things Not at all   Q2: Feeling down, depressed or hopeless Not at all   PHQ-2 Score 0       Abuse: Current or Past (Physical, Sexual or Emotional) - No  Do you feel safe in your environment? Yes    Wt Readings from Last 2 Encounters:   09/18/20 47.2 kg (104 lb)   01/02/20 53.5 kg (118 lb)           Social History     Tobacco Use     Smoking status: Current Every Day Smoker     Packs/day: 0.75     Years: 8.00     Pack years: 6.00     Types: Cigarettes     Smokeless tobacco: Never Used     Tobacco  comment: smokes only when stressed out    Substance Use Topics     Alcohol use: No     If you drink alcohol do you typically have >3 drinks per day or >7 drinks per week? Not applicable    Alcohol Use 9/18/2020   Prescreen: >3 drinks/day or >7 drinks/week? Not Applicable   Prescreen: >3 drinks/day or >7 drinks/week? -   No flowsheet data found.    Reviewed orders with patient.  Reviewed health maintenance and updated orders accordingly - Yes  Lab work is in process    Mammogram not appropriate for this patient based on age.    Pertinent mammograms are reviewed under the imaging tab.  History of abnormal Pap smear: NO - age 30-65 PAP every 5 years with negative HPV co-testing recommended  PAP / HPV Latest Ref Rng & Units 7/26/2018 8/19/2015 1/24/2011   PAP - NIL ASC-US(A) NIL   HPV 16 DNA NEG:Negative Negative Negative -   HPV 18 DNA NEG:Negative Negative Negative -   OTHER HR HPV NEG:Negative Negative Negative -     Reviewed and updated as needed this visit by clinical staff  Tobacco  Soc Hx        Reviewed and updated as needed this visit by Provider        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     Other, mixed, or unspecified nondependent drug abuse, unspecified     Drug /ETOH abuse (non-depend.)      Past Surgical History:   Procedure Laterality Date     C APPENDECTOMY         Review of Systems   Constitutional: Negative for chills and fever.   HENT: Negative for congestion, ear pain, hearing loss and sore throat.    Eyes: Negative for pain and visual disturbance.   Respiratory: Negative for cough and shortness of breath.    Cardiovascular: Negative for chest pain, palpitations and peripheral edema.   Gastrointestinal: Positive for abdominal pain and constipation. Negative for diarrhea, heartburn, hematochezia and nausea.   Genitourinary: Positive for dysuria and frequency.  "Negative for genital sores, hematuria and urgency.   Musculoskeletal: Negative for arthralgias, joint swelling and myalgias.   Skin: Negative for rash.   Neurological: Negative for dizziness, weakness, headaches and paresthesias.   Psychiatric/Behavioral: Positive for mood changes. The patient is not nervous/anxious.           OBJECTIVE:   BP 98/62   Pulse 70   Temp 98.6  F (37  C) (Temporal)   Resp 18   Ht 1.6 m (5' 3\")   Wt 47.2 kg (104 lb)   LMP 08/29/2020   BMI 18.42 kg/m    Physical Exam  Constitutional:       Appearance: She is well-developed.   HENT:      Right Ear: External ear normal.      Left Ear: External ear normal.      Nose: Nose normal.   Eyes:      General: Lids are normal.      Conjunctiva/sclera: Conjunctivae normal.      Pupils: Pupils are equal, round, and reactive to light.   Neck:      Musculoskeletal: Normal range of motion and neck supple.      Thyroid: No thyroid mass.   Cardiovascular:      Rate and Rhythm: Normal rate and regular rhythm.      Heart sounds: Normal heart sounds.   Pulmonary:      Effort: Pulmonary effort is normal.      Breath sounds: Normal breath sounds.   Chest:      Breasts:         Right: Normal.         Left: Normal.   Abdominal:      General: Bowel sounds are normal.      Palpations: Abdomen is soft.      Tenderness: There is no abdominal tenderness.   Genitourinary:     Labia:         Right: No rash, tenderness, lesion or injury.         Left: No rash, tenderness, lesion or injury.       Adnexa: Right adnexa normal and left adnexa normal.        Right: No mass, tenderness or fullness.          Left: No mass, tenderness or fullness.        Rectum: Normal.   Musculoskeletal: Normal range of motion.   Lymphadenopathy:      Head:      Right side of head: No submental, submandibular, tonsillar, preauricular, posterior auricular or occipital adenopathy.      Left side of head: No submental, submandibular, tonsillar, preauricular, posterior auricular or occipital " adenopathy.      Cervical: No cervical adenopathy.      Upper Body:      Right upper body: No supraclavicular, axillary or pectoral adenopathy.      Left upper body: No supraclavicular, axillary or pectoral adenopathy.   Skin:     General: Skin is warm and dry.   Neurological:      General: No focal deficit present.      Mental Status: She is alert and oriented to person, place, and time.      Cranial Nerves: Cranial nerves are intact.   Psychiatric:         Attention and Perception: Attention normal.         Mood and Affect: Mood normal.         Behavior: Behavior normal.         Thought Content: Thought content normal.         Judgment: Judgment normal.           Diagnostic Test Results:  Labs reviewed in Epic  No results found for this or any previous visit (from the past 24 hour(s)).    ASSESSMENT/PLAN:   1. Routine general medical examination at a health care facility  -Updated HM  - Declined vaccines  - See below for additional concerns and billing.   - Urine Microscopic    2. Hyperlipidemia LDL goal <160  - Return to clinic for fasting labs     3. TRACEY (generalized anxiety disorder)  - Stable     4. Weight loss  - Ongoing. Continues to lose weight over the last 6 months. Does not intentionally try. Has been eating and not made any specific changes. Denies depression symptoms. Denies any bleeding. No nausea or vomiting, no new medications. No family history of thyroid disorder.   - Discussed basic work up and then follow up with me as this is concerning.   Wt Readings from Last 2 Encounters:   09/18/20 47.2 kg (104 lb)   01/02/20 53.5 kg (118 lb)     - CBC with platelets and differential  - Comprehensive metabolic panel (BMP + Alb, Alk Phos, ALT, AST, Total. Bili, TP)  - TSH with free T4 reflex  - *UA reflex to Microscopic and Culture ([place] [facility] (except [place] and [place])    5. Tobacco use disorder      6. Screen for STD (sexually transmitted disease)  - Has has some pelvic pain with  "vaginal discharge (creamy discolored).   - Recommend labs today for STD, pelvic exam without tenderness noted today, recommend pelvic ultrasound as well.   - Wet prep  - Chlamydia trachomatis PCR  - Neisseria gonorrhoeae PCR  - Hepatitis C RNA, quantitative  - Hepatitis B surface antigen  - HIV Antigen Antibody Combo  - US Pelvic Complete with Transvaginal; Future    7. Screening for diabetes mellitus  - Return for fasting labs.     8. Screening for lipid disorders    - Lipid panel reflex to direct LDL Fasting; Future    9. Pelvic pain  - As noted above   - US Pelvic Complete with Transvaginal; Future    COUNSELING:  Reviewed preventive health counseling, as reflected in patient instructions       Regular exercise       Healthy diet/nutrition       Immunizations    Declined: Influenza due to Other Side effects                   Safe sex practices/STD prevention    Estimated body mass index is 18.42 kg/m  as calculated from the following:    Height as of this encounter: 1.6 m (5' 3\").    Weight as of this encounter: 47.2 kg (104 lb).    Weight management plan Work up for weight loss    She reports that she has been smoking cigarettes. She has a 6.00 pack-year smoking history. She has never used smokeless tobacco.      Counseling Resources:  ATP IV Guidelines  Pooled Cohorts Equation Calculator  Breast Cancer Risk Calculator  BRCA-Related Cancer Risk Assessment: FHS-7 Tool  FRAX Risk Assessment  ICSI Preventive Guidelines  Dietary Guidelines for Americans, 2010  USDA's MyPlate  ASA Prophylaxis  Lung CA Screening    KALA Dee [facility]  "

## 2020-09-18 ENCOUNTER — OFFICE VISIT (OUTPATIENT)
Dept: FAMILY MEDICINE | Facility: OTHER | Age: 45
End: 2020-09-18
Payer: COMMERCIAL

## 2020-09-18 VITALS
TEMPERATURE: 98.6 F | RESPIRATION RATE: 18 BRPM | HEART RATE: 70 BPM | SYSTOLIC BLOOD PRESSURE: 98 MMHG | BODY MASS INDEX: 18.43 KG/M2 | HEIGHT: 63 IN | WEIGHT: 104 LBS | DIASTOLIC BLOOD PRESSURE: 62 MMHG

## 2020-09-18 DIAGNOSIS — R10.2 PELVIC PAIN: ICD-10-CM

## 2020-09-18 DIAGNOSIS — E78.5 HYPERLIPIDEMIA LDL GOAL <160: ICD-10-CM

## 2020-09-18 DIAGNOSIS — Z00.00 ROUTINE GENERAL MEDICAL EXAMINATION AT A HEALTH CARE FACILITY: Primary | ICD-10-CM

## 2020-09-18 DIAGNOSIS — Z13.220 SCREENING FOR LIPID DISORDERS: ICD-10-CM

## 2020-09-18 DIAGNOSIS — F41.1 GAD (GENERALIZED ANXIETY DISORDER): ICD-10-CM

## 2020-09-18 DIAGNOSIS — Z13.1 SCREENING FOR DIABETES MELLITUS: ICD-10-CM

## 2020-09-18 DIAGNOSIS — Z11.3 SCREEN FOR STD (SEXUALLY TRANSMITTED DISEASE): ICD-10-CM

## 2020-09-18 DIAGNOSIS — R63.4 WEIGHT LOSS: ICD-10-CM

## 2020-09-18 DIAGNOSIS — F17.200 TOBACCO USE DISORDER: ICD-10-CM

## 2020-09-18 LAB
ALBUMIN SERPL-MCNC: 4.6 G/DL (ref 3.4–5)
ALBUMIN UR-MCNC: NEGATIVE MG/DL
ALP SERPL-CCNC: 74 U/L (ref 40–150)
ALT SERPL W P-5'-P-CCNC: 19 U/L (ref 0–50)
ANION GAP SERPL CALCULATED.3IONS-SCNC: 7 MMOL/L (ref 3–14)
APPEARANCE UR: CLEAR
AST SERPL W P-5'-P-CCNC: 16 U/L (ref 0–45)
BASOPHILS # BLD AUTO: 0 10E9/L (ref 0–0.2)
BASOPHILS NFR BLD AUTO: 0.3 %
BILIRUB SERPL-MCNC: 0.5 MG/DL (ref 0.2–1.3)
BILIRUB UR QL STRIP: NEGATIVE
BUN SERPL-MCNC: 10 MG/DL (ref 7–30)
CALCIUM SERPL-MCNC: 9.2 MG/DL (ref 8.5–10.1)
CHLORIDE SERPL-SCNC: 104 MMOL/L (ref 94–109)
CO2 SERPL-SCNC: 27 MMOL/L (ref 20–32)
COLOR UR AUTO: YELLOW
CREAT SERPL-MCNC: 0.93 MG/DL (ref 0.52–1.04)
DIFFERENTIAL METHOD BLD: NORMAL
EOSINOPHIL # BLD AUTO: 0.1 10E9/L (ref 0–0.7)
EOSINOPHIL NFR BLD AUTO: 1.1 %
ERYTHROCYTE [DISTWIDTH] IN BLOOD BY AUTOMATED COUNT: 12 % (ref 10–15)
GFR SERPL CREATININE-BSD FRML MDRD: 75 ML/MIN/{1.73_M2}
GLUCOSE SERPL-MCNC: 72 MG/DL (ref 70–99)
GLUCOSE UR STRIP-MCNC: NEGATIVE MG/DL
HCT VFR BLD AUTO: 39.3 % (ref 35–47)
HGB BLD-MCNC: 13.3 G/DL (ref 11.7–15.7)
HGB UR QL STRIP: ABNORMAL
KETONES UR STRIP-MCNC: NEGATIVE MG/DL
LEUKOCYTE ESTERASE UR QL STRIP: NEGATIVE
LYMPHOCYTES # BLD AUTO: 2.6 10E9/L (ref 0.8–5.3)
LYMPHOCYTES NFR BLD AUTO: 27.8 %
MCH RBC QN AUTO: 30.7 PG (ref 26.5–33)
MCHC RBC AUTO-ENTMCNC: 33.8 G/DL (ref 31.5–36.5)
MCV RBC AUTO: 91 FL (ref 78–100)
MONOCYTES # BLD AUTO: 0.6 10E9/L (ref 0–1.3)
MONOCYTES NFR BLD AUTO: 6.9 %
NEUTROPHILS # BLD AUTO: 5.9 10E9/L (ref 1.6–8.3)
NEUTROPHILS NFR BLD AUTO: 63.9 %
NITRATE UR QL: NEGATIVE
NON-SQ EPI CELLS #/AREA URNS LPF: NORMAL /LPF
PH UR STRIP: 7 PH (ref 5–7)
PLATELET # BLD AUTO: 291 10E9/L (ref 150–450)
POTASSIUM SERPL-SCNC: 3.3 MMOL/L (ref 3.4–5.3)
PROT SERPL-MCNC: 8.6 G/DL (ref 6.8–8.8)
RBC # BLD AUTO: 4.33 10E12/L (ref 3.8–5.2)
RBC #/AREA URNS AUTO: NORMAL /HPF
SODIUM SERPL-SCNC: 138 MMOL/L (ref 133–144)
SOURCE: ABNORMAL
SP GR UR STRIP: 1.01 (ref 1–1.03)
SPECIMEN SOURCE: NORMAL
TSH SERPL DL<=0.005 MIU/L-ACNC: 1.61 MU/L (ref 0.4–4)
UROBILINOGEN UR STRIP-ACNC: 0.2 EU/DL (ref 0.2–1)
WBC # BLD AUTO: 9.2 10E9/L (ref 4–11)
WBC #/AREA URNS AUTO: NORMAL /HPF
WET PREP SPEC: NORMAL

## 2020-09-18 PROCEDURE — 36415 COLL VENOUS BLD VENIPUNCTURE: CPT | Performed by: NURSE PRACTITIONER

## 2020-09-18 PROCEDURE — 87210 SMEAR WET MOUNT SALINE/INK: CPT | Performed by: NURSE PRACTITIONER

## 2020-09-18 PROCEDURE — 87591 N.GONORRHOEAE DNA AMP PROB: CPT | Performed by: NURSE PRACTITIONER

## 2020-09-18 PROCEDURE — 87389 HIV-1 AG W/HIV-1&-2 AB AG IA: CPT | Performed by: NURSE PRACTITIONER

## 2020-09-18 PROCEDURE — 99213 OFFICE O/P EST LOW 20 MIN: CPT | Mod: 25 | Performed by: NURSE PRACTITIONER

## 2020-09-18 PROCEDURE — 87491 CHLMYD TRACH DNA AMP PROBE: CPT | Performed by: NURSE PRACTITIONER

## 2020-09-18 PROCEDURE — 87340 HEPATITIS B SURFACE AG IA: CPT | Performed by: NURSE PRACTITIONER

## 2020-09-18 PROCEDURE — 99396 PREV VISIT EST AGE 40-64: CPT | Performed by: NURSE PRACTITIONER

## 2020-09-18 PROCEDURE — 87522 HEPATITIS C REVRS TRNSCRPJ: CPT | Performed by: NURSE PRACTITIONER

## 2020-09-18 PROCEDURE — 80050 GENERAL HEALTH PANEL: CPT | Performed by: NURSE PRACTITIONER

## 2020-09-18 PROCEDURE — 81001 URINALYSIS AUTO W/SCOPE: CPT | Performed by: NURSE PRACTITIONER

## 2020-09-18 ASSESSMENT — ENCOUNTER SYMPTOMS
SORE THROAT: 0
DIARRHEA: 0
FREQUENCY: 1
ABDOMINAL PAIN: 1
HEMATOCHEZIA: 0
COUGH: 0
PALPITATIONS: 0
EYE PAIN: 0
SHORTNESS OF BREATH: 0
JOINT SWELLING: 0
WEAKNESS: 0
NERVOUS/ANXIOUS: 0
DYSURIA: 1
HEARTBURN: 0
PARESTHESIAS: 0
CHILLS: 0
CONSTIPATION: 1
HEADACHES: 0
DIZZINESS: 0
ARTHRALGIAS: 0
NAUSEA: 0
HEMATURIA: 0
FEVER: 0
MYALGIAS: 0

## 2020-09-18 ASSESSMENT — ANXIETY QUESTIONNAIRES
3. WORRYING TOO MUCH ABOUT DIFFERENT THINGS: SEVERAL DAYS
7. FEELING AFRAID AS IF SOMETHING AWFUL MIGHT HAPPEN: NOT AT ALL
GAD7 TOTAL SCORE: 3
6. BECOMING EASILY ANNOYED OR IRRITABLE: SEVERAL DAYS
1. FEELING NERVOUS, ANXIOUS, OR ON EDGE: NOT AT ALL
2. NOT BEING ABLE TO STOP OR CONTROL WORRYING: SEVERAL DAYS
5. BEING SO RESTLESS THAT IT IS HARD TO SIT STILL: NOT AT ALL
7. FEELING AFRAID AS IF SOMETHING AWFUL MIGHT HAPPEN: NOT AT ALL
GAD7 TOTAL SCORE: 3
4. TROUBLE RELAXING: NOT AT ALL
GAD7 TOTAL SCORE: 3

## 2020-09-18 ASSESSMENT — PATIENT HEALTH QUESTIONNAIRE - PHQ9
10. IF YOU CHECKED OFF ANY PROBLEMS, HOW DIFFICULT HAVE THESE PROBLEMS MADE IT FOR YOU TO DO YOUR WORK, TAKE CARE OF THINGS AT HOME, OR GET ALONG WITH OTHER PEOPLE: NOT DIFFICULT AT ALL
SUM OF ALL RESPONSES TO PHQ QUESTIONS 1-9: 2
SUM OF ALL RESPONSES TO PHQ QUESTIONS 1-9: 2

## 2020-09-18 ASSESSMENT — MIFFLIN-ST. JEOR: SCORE: 1095.87

## 2020-09-19 ASSESSMENT — ANXIETY QUESTIONNAIRES: GAD7 TOTAL SCORE: 3

## 2020-09-21 LAB
HBV SURFACE AG SERPL QL IA: NONREACTIVE
HCV RNA SERPL NAA+PROBE-ACNC: NORMAL [IU]/ML
HCV RNA SERPL NAA+PROBE-LOG IU: NORMAL LOG IU/ML
HIV 1+2 AB+HIV1 P24 AG SERPL QL IA: NONREACTIVE

## 2020-10-19 ENCOUNTER — ANCILLARY PROCEDURE (OUTPATIENT)
Dept: ULTRASOUND IMAGING | Facility: OTHER | Age: 45
End: 2020-10-19
Attending: NURSE PRACTITIONER
Payer: COMMERCIAL

## 2020-10-19 DIAGNOSIS — Z11.3 SCREEN FOR STD (SEXUALLY TRANSMITTED DISEASE): ICD-10-CM

## 2020-10-19 DIAGNOSIS — R10.2 PELVIC PAIN: ICD-10-CM

## 2020-10-20 ENCOUNTER — TELEPHONE (OUTPATIENT)
Dept: FAMILY MEDICINE | Facility: OTHER | Age: 45
End: 2020-10-20

## 2020-10-20 NOTE — TELEPHONE ENCOUNTER
Lm for patient to call us back    Please call patient.  There is a small amount of fluid in the endometrial cavity which could be related to her period if she currently has had or is about to have her period.     No concerns with ovaries.     Recommend scheduling follow-up with KV.     Lois Griffith PA-C

## 2021-01-07 ENCOUNTER — MYC MEDICAL ADVICE (OUTPATIENT)
Dept: FAMILY MEDICINE | Facility: OTHER | Age: 46
End: 2021-01-07

## 2021-01-07 NOTE — TELEPHONE ENCOUNTER
I agree we need to do a further work up and some imaging.     I spoke with Lois Griffith who is willing to see patient on Friday and will proceed with imaging and labs.     Please let patient know she is scheduled with ES 1/8/21 at 10:40 am. Otherwise she can see me next Wednesday if this does not work.         KALA Dee CNP  Questions or concerns please feel free to send me a [employer] message or call me  Phone : [phone]

## 2021-01-07 NOTE — TELEPHONE ENCOUNTER
Called and discussed message below with patient. She stated that tomorrow does not work for her. Therefore, the patient was schedule with Julia next Wednesday at 4:20.     Nikolai Page RN, BSN  [facility] [place]  January 7, 2021

## 2021-01-10 ENCOUNTER — HEALTH MAINTENANCE LETTER (OUTPATIENT)
Age: 46
End: 2021-01-10

## 2021-01-13 ENCOUNTER — MYC MEDICAL ADVICE (OUTPATIENT)
Dept: FAMILY MEDICINE | Facility: OTHER | Age: 46
End: 2021-01-13

## 2021-01-13 ENCOUNTER — OFFICE VISIT (OUTPATIENT)
Dept: FAMILY MEDICINE | Facility: OTHER | Age: 46
End: 2021-01-13
Payer: COMMERCIAL

## 2021-01-13 ENCOUNTER — TELEPHONE (OUTPATIENT)
Dept: FAMILY MEDICINE | Facility: OTHER | Age: 46
End: 2021-01-13

## 2021-01-13 VITALS
TEMPERATURE: 97.9 F | SYSTOLIC BLOOD PRESSURE: 110 MMHG | HEART RATE: 69 BPM | WEIGHT: 106 LBS | HEIGHT: 63 IN | BODY MASS INDEX: 18.78 KG/M2 | OXYGEN SATURATION: 100 % | DIASTOLIC BLOOD PRESSURE: 74 MMHG

## 2021-01-13 DIAGNOSIS — F41.1 GAD (GENERALIZED ANXIETY DISORDER): ICD-10-CM

## 2021-01-13 DIAGNOSIS — N76.0 BACTERIAL VAGINITIS: Primary | ICD-10-CM

## 2021-01-13 DIAGNOSIS — R63.4 WEIGHT LOSS: Primary | ICD-10-CM

## 2021-01-13 DIAGNOSIS — R87.610 ASCUS OF CERVIX WITH NEGATIVE HIGH RISK HPV: ICD-10-CM

## 2021-01-13 DIAGNOSIS — Z13.220 SCREENING FOR HYPERLIPIDEMIA: ICD-10-CM

## 2021-01-13 DIAGNOSIS — B96.89 BACTERIAL VAGINITIS: Primary | ICD-10-CM

## 2021-01-13 LAB
ALBUMIN SERPL-MCNC: 3.8 G/DL (ref 3.4–5)
ALP SERPL-CCNC: 60 U/L (ref 40–150)
ALT SERPL W P-5'-P-CCNC: 13 U/L (ref 0–50)
ANION GAP SERPL CALCULATED.3IONS-SCNC: 4 MMOL/L (ref 3–14)
AST SERPL W P-5'-P-CCNC: 9 U/L (ref 0–45)
BASOPHILS # BLD AUTO: 0 10E9/L (ref 0–0.2)
BASOPHILS NFR BLD AUTO: 0.4 %
BILIRUB SERPL-MCNC: 0.3 MG/DL (ref 0.2–1.3)
BUN SERPL-MCNC: 7 MG/DL (ref 7–30)
CALCIUM SERPL-MCNC: 8.9 MG/DL (ref 8.5–10.1)
CHLORIDE SERPL-SCNC: 108 MMOL/L (ref 94–109)
CHOLEST SERPL-MCNC: 162 MG/DL
CO2 SERPL-SCNC: 29 MMOL/L (ref 20–32)
CREAT SERPL-MCNC: 0.79 MG/DL (ref 0.52–1.04)
CRP SERPL-MCNC: <2.9 MG/L (ref 0–8)
DIFFERENTIAL METHOD BLD: NORMAL
EOSINOPHIL # BLD AUTO: 0.1 10E9/L (ref 0–0.7)
EOSINOPHIL NFR BLD AUTO: 1.4 %
ERYTHROCYTE [DISTWIDTH] IN BLOOD BY AUTOMATED COUNT: 11.8 % (ref 10–15)
ERYTHROCYTE [SEDIMENTATION RATE] IN BLOOD BY WESTERGREN METHOD: 6 MM/H (ref 0–20)
GFR SERPL CREATININE-BSD FRML MDRD: >90 ML/MIN/{1.73_M2}
GLUCOSE SERPL-MCNC: 73 MG/DL (ref 70–99)
HCT VFR BLD AUTO: 35.3 % (ref 35–47)
HDLC SERPL-MCNC: 52 MG/DL
HGB BLD-MCNC: 11.8 G/DL (ref 11.7–15.7)
LDLC SERPL CALC-MCNC: 97 MG/DL
LYMPHOCYTES # BLD AUTO: 2.2 10E9/L (ref 0.8–5.3)
LYMPHOCYTES NFR BLD AUTO: 31.4 %
MCH RBC QN AUTO: 30.5 PG (ref 26.5–33)
MCHC RBC AUTO-ENTMCNC: 33.4 G/DL (ref 31.5–36.5)
MCV RBC AUTO: 91 FL (ref 78–100)
MONOCYTES # BLD AUTO: 0.4 10E9/L (ref 0–1.3)
MONOCYTES NFR BLD AUTO: 5.3 %
NEUTROPHILS # BLD AUTO: 4.4 10E9/L (ref 1.6–8.3)
NEUTROPHILS NFR BLD AUTO: 61.5 %
NONHDLC SERPL-MCNC: 110 MG/DL
PLATELET # BLD AUTO: 283 10E9/L (ref 150–450)
POTASSIUM SERPL-SCNC: 3.6 MMOL/L (ref 3.4–5.3)
PROT SERPL-MCNC: 7.2 G/DL (ref 6.8–8.8)
RBC # BLD AUTO: 3.87 10E12/L (ref 3.8–5.2)
RETICS # AUTO: 55.3 10E9/L (ref 25–95)
RETICS/RBC NFR AUTO: 1.4 % (ref 0.5–2)
SODIUM SERPL-SCNC: 141 MMOL/L (ref 133–144)
SPECIMEN SOURCE: ABNORMAL
TRIGL SERPL-MCNC: 65 MG/DL
TSH SERPL DL<=0.005 MIU/L-ACNC: 1.2 MU/L (ref 0.4–4)
WBC # BLD AUTO: 7.1 10E9/L (ref 4–11)
WET PREP SPEC: ABNORMAL

## 2021-01-13 PROCEDURE — 87210 SMEAR WET MOUNT SALINE/INK: CPT | Performed by: NURSE PRACTITIONER

## 2021-01-13 PROCEDURE — 36415 COLL VENOUS BLD VENIPUNCTURE: CPT | Performed by: NURSE PRACTITIONER

## 2021-01-13 PROCEDURE — 85045 AUTOMATED RETICULOCYTE COUNT: CPT | Performed by: NURSE PRACTITIONER

## 2021-01-13 PROCEDURE — 81003 URINALYSIS AUTO W/O SCOPE: CPT | Performed by: NURSE PRACTITIONER

## 2021-01-13 PROCEDURE — 86431 RHEUMATOID FACTOR QUANT: CPT | Performed by: NURSE PRACTITIONER

## 2021-01-13 PROCEDURE — 99214 OFFICE O/P EST MOD 30 MIN: CPT | Performed by: NURSE PRACTITIONER

## 2021-01-13 PROCEDURE — 86140 C-REACTIVE PROTEIN: CPT | Performed by: NURSE PRACTITIONER

## 2021-01-13 PROCEDURE — 86200 CCP ANTIBODY: CPT | Performed by: NURSE PRACTITIONER

## 2021-01-13 PROCEDURE — 85652 RBC SED RATE AUTOMATED: CPT | Performed by: NURSE PRACTITIONER

## 2021-01-13 PROCEDURE — 86038 ANTINUCLEAR ANTIBODIES: CPT | Performed by: NURSE PRACTITIONER

## 2021-01-13 PROCEDURE — 80050 GENERAL HEALTH PANEL: CPT | Performed by: NURSE PRACTITIONER

## 2021-01-13 PROCEDURE — 80061 LIPID PANEL: CPT | Performed by: NURSE PRACTITIONER

## 2021-01-13 RX ORDER — BUSPIRONE HYDROCHLORIDE 5 MG/1
5 TABLET ORAL 2 TIMES DAILY
Qty: 60 TABLET | Refills: 0 | Status: SHIPPED | OUTPATIENT
Start: 2021-01-13 | End: 2021-02-03

## 2021-01-13 ASSESSMENT — MIFFLIN-ST. JEOR: SCORE: 1104.94

## 2021-01-13 NOTE — PATIENT INSTRUCTIONS
- Labs today   - CT as scheduled   - Recommend starting Buspar twice daily with meals  - Journal how the medication is making you feel.   - Meditation for anxiety, deep breathing, positive self talk.         KALA Dee CNP  Questions or concerns please feel free to send me a [employer] message or call me  Phone : [phone]      Patient Education     Buspirone Hydrochloride Oral tablet  What is this medicine?  BUSPIRONE (byoo SPYE anu) is used to treat anxiety disorders.  This medicine may be used for other purposes; ask your health care provider or pharmacist if you have questions.  What should I tell my health care provider before I take this medicine?  They need to know if you have any of these conditions:    kidney or liver disease    an unusual or allergic reaction to buspirone, other medicines, foods, dyes, or preservatives    pregnant or trying to get pregnant    breast-feeding  How should I use this medicine?  Take this medicine by mouth with a glass of water. Follow the directions on the prescription label. You may take this medicine with or without food. To ensure that this medicine always works the same way for you, you should take it either always with or always without food. Take your doses at regular intervals. Do not take your medicine more often than directed. Do not stop taking except on the advice of your doctor or health care professional.  Talk to your pediatrician regarding the use of this medicine in children. Special care may be needed.  Overdosage: If you think you have taken too much of this medicine contact a poison control center or emergency room at once.  NOTE: This medicine is only for you. Do not share this medicine with others.  What if I miss a dose?  If you miss a dose, take it as soon as you can. If it is almost time for your next dose, take only that dose. Do not take double or extra doses.  What may interact with this medicine?  Do not take this medicine with any of the  following medications:    linezolid    MAOIs like Carbex, Eldepryl, Marplan, Nardil, and Parnate    methylene blue    procarbazine  This medicine may also interact with the following medications:    diazepam    digoxin    diltiazem    erythromycin    grapefruit juice    haloperidol    medicines for mental depression or mood problems    medicines for seizures like carbamazepine, phenobarbital and phenytoin    nefazodone    other medications for anxiety    rifampin    ritonavir    some antifungal medicines like itraconazole, ketoconazole, and voriconazole    verapamil    warfarin  This list may not describe all possible interactions. Give your health care provider a list of all the medicines, herbs, non-prescription drugs, or dietary supplements you use. Also tell them if you smoke, drink alcohol, or use illegal drugs. Some items may interact with your medicine.  What should I watch for while using this medicine?  Visit your doctor or health care professional for regular checks on your progress. It may take 1 to 2 weeks before your anxiety gets better.  You may get drowsy or dizzy. Do not drive, use machinery, or do anything that needs mental alertness until you know how this drug affects you. Do not stand or sit up quickly, especially if you are an older patient. This reduces the risk of dizzy or fainting spells. Alcohol can make you more drowsy and dizzy. Avoid alcoholic drinks.  What side effects may I notice from receiving this medicine?  Side effects that you should report to your doctor or health care professional as soon as possible:    blurred vision or other vision changes    chest pain    confusion    difficulty breathing    feelings of hostility or anger    muscle aches and pains    numbness or tingling in hands or feet    ringing in the ears    skin rash and itching    vomiting    weakness  Side effects that usually do not require medical attention (report to your doctor or health care professional if they  continue or are bothersome):    disturbed dreams, nightmares    headache    nausea    restlessness or nervousness    sore throat and nasal congestion    stomach upset  This list may not describe all possible side effects. Call your doctor for medical advice about side effects. You may report side effects to FDA at 6-053-UHL-4541.  Where should I keep my medicine?  Keep out of the reach of children.  Store at room temperature below 30 degrees C (86 degrees F). Protect from light. Keep container tightly closed. Throw away any unused medicine after the expiration date.  NOTE:This sheet is a summary. It may not cover all possible information. If you have questions about this medicine, talk to your doctor, pharmacist, or health care provider. Copyright  2016 [employer]

## 2021-01-13 NOTE — PROGRESS NOTES
Assessment & Plan     Weight loss  - Reviewed weight loss over time it appears she has lost quite a bit of weight in the last 9-12 months.   - Unclear etiology. Labs at last visit were normal with pelvic ultrasound.   - Patient notes that she is concerned that something is wrong although she does note increased anxiety as well.   - Discussed labs and imaging.  - Comprehensive metabolic panel (BMP + Alb, Alk Phos, ALT, AST, Total. Bili, TP)  - CBC with platelets and differential  - Blood Morphology Pathologist Review  - Reticulocyte Count  - CRP, inflammation  - ESR: Erythrocyte sedimentation rate  - Anti Nuclear Lay IgG by IFA with Reflex  - Cyclic Citrullinated Peptide Antibody IgG  - Rheumatoid factor  - TSH with free T4 reflex  - CT Chest Abdomen Pelvis w/o & w Contrast; Future  - Lipid panel reflex to direct LDL Non-fasting  - *UA reflex to Microscopic and Culture ([facility] (except [place] and [place])  - Wet prep    TRACEY (generalized anxiety disorder)  - Continue counseling  - Recommend trial of Buspar 5mg twice daily and increase depending on how she does.   - busPIRone (BUSPAR) 5 MG tablet; Take 1 tablet (5 mg) by mouth 2 times daily    Screening for hyperlipidemia    - Lipid panel reflex to direct LDL Non-fasting          The patient indicates understanding of these issues and agrees with the plan.    Patient Instructions   - Labs today   - CT as scheduled   - Recommend starting Buspar twice daily with meals  - Journal how the medication is making you feel.   - Meditation for anxiety, deep breathing, positive self talk.         KALA Dee CNP  Questions or concerns please feel free to send me a [employer] message or call me  Phone : [phone]      Patient Education     Buspirone Hydrochloride Oral tablet  What is this medicine?  BUSPIRONE (ignacia brennan) is used to treat anxiety disorders.  This medicine may be used for other purposes; ask your health care provider or  pharmacist if you have questions.  What should I tell my health care provider before I take this medicine?  They need to know if you have any of these conditions:    kidney or liver disease    an unusual or allergic reaction to buspirone, other medicines, foods, dyes, or preservatives    pregnant or trying to get pregnant    breast-feeding  How should I use this medicine?  Take this medicine by mouth with a glass of water. Follow the directions on the prescription label. You may take this medicine with or without food. To ensure that this medicine always works the same way for you, you should take it either always with or always without food. Take your doses at regular intervals. Do not take your medicine more often than directed. Do not stop taking except on the advice of your doctor or health care professional.  Talk to your pediatrician regarding the use of this medicine in children. Special care may be needed.  Overdosage: If you think you have taken too much of this medicine contact a poison control center or emergency room at once.  NOTE: This medicine is only for you. Do not share this medicine with others.  What if I miss a dose?  If you miss a dose, take it as soon as you can. If it is almost time for your next dose, take only that dose. Do not take double or extra doses.  What may interact with this medicine?  Do not take this medicine with any of the following medications:    linezolid    MAOIs like Carbex, Eldepryl, Marplan, Nardil, and Parnate    methylene blue    procarbazine  This medicine may also interact with the following medications:    diazepam    digoxin    diltiazem    erythromycin    grapefruit juice    haloperidol    medicines for mental depression or mood problems    medicines for seizures like carbamazepine, phenobarbital and phenytoin    nefazodone    other medications for anxiety    rifampin    ritonavir    some antifungal medicines like itraconazole, ketoconazole, and  voriconazole    verapamil    warfarin  This list may not describe all possible interactions. Give your health care provider a list of all the medicines, herbs, non-prescription drugs, or dietary supplements you use. Also tell them if you smoke, drink alcohol, or use illegal drugs. Some items may interact with your medicine.  What should I watch for while using this medicine?  Visit your doctor or health care professional for regular checks on your progress. It may take 1 to 2 weeks before your anxiety gets better.  You may get drowsy or dizzy. Do not drive, use machinery, or do anything that needs mental alertness until you know how this drug affects you. Do not stand or sit up quickly, especially if you are an older patient. This reduces the risk of dizzy or fainting spells. Alcohol can make you more drowsy and dizzy. Avoid alcoholic drinks.  What side effects may I notice from receiving this medicine?  Side effects that you should report to your doctor or health care professional as soon as possible:    blurred vision or other vision changes    chest pain    confusion    difficulty breathing    feelings of hostility or anger    muscle aches and pains    numbness or tingling in hands or feet    ringing in the ears    skin rash and itching    vomiting    weakness  Side effects that usually do not require medical attention (report to your doctor or health care professional if they continue or are bothersome):    disturbed dreams, nightmares    headache    nausea    restlessness or nervousness    sore throat and nasal congestion    stomach upset  This list may not describe all possible side effects. Call your doctor for medical advice about side effects. You may report side effects to FDA at 9-222-FDA-6445.  Where should I keep my medicine?  Keep out of the reach of children.  Store at room temperature below 30 degrees C (86 degrees F). Protect from light. Keep container tightly closed. Throw away any unused medicine  "after the expiration date.  NOTE:This sheet is a summary. It may not cover all possible information. If you have questions about this medicine, talk to your doctor, pharmacist, or health care provider. Copyright  2016 Gold Standard               Return in about 1 week (around 1/20/2021) for Mood Check.    KALA Dee CNP  [facility]    Julian Kinsey is a 45 year old female who presents to clinic today for the following health issues accompanied by her   HPI     Weight concerns - Patient states that you know all about it. She has lost over 30lbs in 6 months.     Vitals:    01/13/21 1621   Weight: 48.1 kg (106 lb)     Wt Readings from Last 2 Encounters:   01/13/21 48.1 kg (106 lb)   09/18/20 47.2 kg (104 lb)     09/20- 118   11/19- 114   09/    Eats everything and anything right now.   No acid reflux  No bleeding  Still getting periods  No abdominal pain  Positive for night sweats, not every night.   Starting to see bones and losing too much weight. This makes her sad to see this.    Carmine notes that she is eating frequently and still losing weight. He does notice increased anxiety level and stress. Over thinking a lot of things. Currently in pre-marital counseling too to help.   Anxious and curious if that is contributing.       Counselor states that there is a possibility that the relationship she is in unhealthy. No abuse.       Review of Systems         Objective    /74 (BP Location: Left arm, Patient Position: Sitting, Cuff Size: Adult Regular)   Pulse 69   Temp 97.9  F (36.6  C) (Temporal)   Ht 1.6 m (5' 3\")   Wt 48.1 kg (106 lb)   SpO2 100%   Breastfeeding No   BMI 18.78 kg/m    Body mass index is 18.78 kg/m .  Physical Exam  HENT:      Mouth/Throat:      Mouth: Mucous membranes are moist.   Eyes:      Pupils: Pupils are equal, round, and reactive to light.   Neck:      Musculoskeletal: Normal range of motion.   Cardiovascular:      Rate and " Rhythm: Normal rate and regular rhythm.   Pulmonary:      Effort: Pulmonary effort is normal.      Breath sounds: Normal breath sounds.   Abdominal:      General: Abdomen is flat. Bowel sounds are normal.      Palpations: Abdomen is soft.   Skin:     General: Skin is warm.   Neurological:      General: No focal deficit present.      Mental Status: She is alert and oriented to person, place, and time.   Psychiatric:         Mood and Affect: Mood normal.         Behavior: Behavior normal.         Thought Content: Thought content normal.         Judgment: Judgment normal.            Results for orders placed or performed in visit on 01/13/21   Comprehensive metabolic panel (BMP + Alb, Alk Phos, ALT, AST, Total. Bili, TP)     Status: None   Result Value Ref Range    Sodium 141 133 - 144 mmol/L    Potassium 3.6 3.4 - 5.3 mmol/L    Chloride 108 94 - 109 mmol/L    Carbon Dioxide 29 20 - 32 mmol/L    Anion Gap 4 3 - 14 mmol/L    Glucose 73 70 - 99 mg/dL    Urea Nitrogen 7 7 - 30 mg/dL    Creatinine 0.79 0.52 - 1.04 mg/dL    GFR Estimate >90 >60 mL/min/[1.73_m2]    GFR Estimate If Black >90 >60 mL/min/[1.73_m2]    Calcium 8.9 8.5 - 10.1 mg/dL    Bilirubin Total 0.3 0.2 - 1.3 mg/dL    Albumin 3.8 3.4 - 5.0 g/dL    Protein Total 7.2 6.8 - 8.8 g/dL    Alkaline Phosphatase 60 40 - 150 U/L    ALT 13 0 - 50 U/L    AST 9 0 - 45 U/L   CBC with platelets and differential     Status: None   Result Value Ref Range    WBC 7.1 4.0 - 11.0 10e9/L    RBC Count 3.87 3.8 - 5.2 10e12/L    Hemoglobin 11.8 11.7 - 15.7 g/dL    Hematocrit 35.3 35.0 - 47.0 %    MCV 91 78 - 100 fl    MCH 30.5 26.5 - 33.0 pg    MCHC 33.4 31.5 - 36.5 g/dL    RDW 11.8 10.0 - 15.0 %    Platelet Count 283 150 - 450 10e9/L    % Neutrophils 61.5 %    % Lymphocytes 31.4 %    % Monocytes 5.3 %    % Eosinophils 1.4 %    % Basophils 0.4 %    Absolute Neutrophil 4.4 1.6 - 8.3 10e9/L    Absolute Lymphocytes 2.2 0.8 - 5.3 10e9/L    Absolute Monocytes 0.4 0.0 - 1.3 10e9/L     Absolute Eosinophils 0.1 0.0 - 0.7 10e9/L    Absolute Basophils 0.0 0.0 - 0.2 10e9/L    Diff Method Automated Method    Blood Morphology Pathologist Review     Status: None   Result Value Ref Range    Copath Report       Patient Name: PRAMOD NASH  MR#: [id number]  Specimen #: [id number]  Collected: 1/13/2021  Received: 1/14/2021  Reported: 1/14/2021 14:10  Ordering Phy(s): JACKSON KIM    For improved result formatting, select 'View Enhanced Report Format' under   Linked Documents section.    TEST(S):  Peripheral Smear Morphology    FINAL DIAGNOSIS:  Peripheral blood: Examination:  - Essentially normal hemogram and blood smear    Electronically signed out by:    Juan Mclean M.D.    CLINICAL HISTORY:  45-year-old woman with unintentional weight loss    PERIPHERAL BLOOD DATA:  PERIPHERAL MORPHOLOGY:    ERYTHROCYTES: The red blood cells appear normochromic.  There is no   significant anisocytosis with minimal  poikilocytosis.  Polychromasia is not increased. No significant   schistocytes are identified.  No rouleaux  formation is seen.    LEUKOCYTES: The white blood cells are normal in number and morphology.  No   overt dysplastic changes are seen.  No circulating blasts are identified.    PLATEL ETS: The platelets are normal in number and granulation.    CLINICAL LAB RESULTS:  Battery Order No. Lab Test Code Clinical Result Ref. Range Units Result   Date  Hemogram/Diff/PLT S77020 ER WBC Count 7.1 4.0-11.0 10e9/L 1/13/2021 17:47       RBC Count 3.87 3.8-5.2 10e12/L 1/13/2021 17:47       Hemoglobin 11.8 11.7-15.7 g/dL 1/13/2021 17:47       Hematocrit 35.3 35.0-47.0 % 1/13/2021 17:47       MCV 91  fl 1/13/2021 17:47       MCH 30.5 26.5-33.0 pg 1/13/2021 17:47       MCHC 33.4 31.5-36.5 g/dL 1/13/2021 17:47       RDW 11.8 10.0-15.0 % 1/13/2021 17:47       Platelet Count 283 150-450 10e9/L 1/13/2021 17:47       % Neutrophils 61.5  % 1/13/2021 17:47       % Lymphocytes 31.4  % 1/13/2021 17:47       % Monocytes  5.3  % 1/13/2021 17:47       % Eosinophils 1.4  % 1/13/2021 17:47       % Basophils 0.4  % 1/13/2021 17:47       abs Neutrophils 4.4 1.6-8.3 10e9/L 1/13/2021 17:47       abs Lymphocytes 2.2 0.8-5.3 10e9/L 1/13/2021 17:47       abs Monocytes 0.4 0.0-1.3 10e9/L 1/13/2021 17 :47       abs Eosinophils 0.1 0.0-0.7 10e9/L 1/13/2021 17:47       abs Basophils 0.0 0.0-0.2 10e9/L 1/13/2021 17:47        SEE TEXT   1/13/2021 17:47       Text/Comments:  Automated Method    Retic  PL Retic % 1.4 0.5-2.0 % 1/13/2021 19:25       Retic abs 55.3 25-95 10e9/L 1/13/2021 19:25    The technical component of this testing was completed at the [facility], with the professional component performed   at the [facility], [address] [place] [address]  [phone] ([phone])    CPT Codes:  A: 71183-GOXH    COLLECTION SITE:  Client:  Replaced by [facility]  Location:  ER (P)       Reticulocyte Count     Status: None   Result Value Ref Range    % Retic 1.4 0.5 - 2.0 %    Absolute Retic 55.3 25 - 95 10e9/L   CRP, inflammation     Status: None   Result Value Ref Range    CRP Inflammation <2.9 0.0 - 8.0 mg/L   ESR: Erythrocyte sedimentation rate     Status: None   Result Value Ref Range    Sed Rate 6 0 - 20 mm/h   Anti Nuclear Lay IgG by IFA with Reflex     Status: None   Result Value Ref Range    SAM interpretation Negative NEG^Negative   Cyclic Citrullinated Peptide Antibody IgG     Status: None   Result Value Ref Range    Cyclic Citrullinated Peptide Antibody, IgG 2 <7 U/mL   Rheumatoid factor     Status: None   Result Value Ref Range    Rheumatoid Factor <7 <12 IU/mL   TSH with free T4 reflex     Status: None   Result Value Ref Range    TSH 1.20 0.40 - 4.00 mU/L   Lipid panel reflex to direct LDL Non-fasting     Status: None   Result Value Ref Range    Cholesterol 162 <200 mg/dL    Triglycerides 65 <150 mg/dL    HDL Cholesterol 52 >49 mg/dL    LDL  Cholesterol Calculated 97 <100 mg/dL    Non HDL Cholesterol 110 <130 mg/dL   *UA reflex to Microscopic and Culture ([facility] (except [place] and [place])     Status: None    Specimen: Unspecified Urine   Result Value Ref Range    Color Urine Yellow     Appearance Urine Clear     Glucose Urine Negative NEG^Negative mg/dL    Bilirubin Urine Negative NEG^Negative    Ketones Urine Negative NEG^Negative mg/dL    Specific Gravity Urine 1.015 1.003 - 1.035    Blood Urine Negative NEG^Negative    pH Urine 7.0 5.0 - 7.0 pH    Protein Albumin Urine Negative NEG^Negative mg/dL    Urobilinogen Urine 0.2 0.2 - 1.0 EU/dL    Nitrite Urine Negative NEG^Negative    Leukocyte Esterase Urine Negative NEG^Negative    Source Unspecified Urine    Wet prep     Status: Abnormal    Specimen: Vagina   Result Value Ref Range    Specimen Description Vagina     Wet Prep No Trichomonas seen     Wet Prep Few  Clue cells seen   (A)     Wet Prep No yeast seen     Wet Prep Few  WBC'S seen

## 2021-01-13 NOTE — TELEPHONE ENCOUNTER
Next 5 appointments (look out 90 days)    Jan 13, 2021  4:20 PM  Office Visit with KALA Lau [facility] ([facility]) [address] [place] [id number] [phone]        KV please review prior to appointment.    Marilyn Gilbert MA

## 2021-01-13 NOTE — TELEPHONE ENCOUNTER
Based on our visitor policy we can call him      KALA Dee CNP  Questions or concerns please feel free to send me a [employer] message or call me  Phone : [phone]

## 2021-01-13 NOTE — TELEPHONE ENCOUNTER
Please call patient her wet prep did show clue cells, which would mean bacterial vaginosis. I can send her some vaginal cream or oral medication to help treat this. Please let me know what she prefers.       KALA Dee CNP  Questions or concerns please feel free to send me a [employer] message or call me  Phone : [phone]

## 2021-01-14 LAB
ALBUMIN UR-MCNC: NEGATIVE MG/DL
ANA SER QL IF: NEGATIVE
APPEARANCE UR: CLEAR
BILIRUB UR QL STRIP: NEGATIVE
CCP AB SER IA-ACNC: 2 U/ML
COLOR UR AUTO: YELLOW
COPATH REPORT: NORMAL
GLUCOSE UR STRIP-MCNC: NEGATIVE MG/DL
HGB UR QL STRIP: NEGATIVE
KETONES UR STRIP-MCNC: NEGATIVE MG/DL
LEUKOCYTE ESTERASE UR QL STRIP: NEGATIVE
NITRATE UR QL: NEGATIVE
PH UR STRIP: 7 PH (ref 5–7)
RHEUMATOID FACT SER NEPH-ACNC: <7 IU/ML (ref 0–20)
SOURCE: NORMAL
SP GR UR STRIP: 1.01 (ref 1–1.03)
UROBILINOGEN UR STRIP-ACNC: 0.2 EU/DL (ref 0.2–1)

## 2021-01-15 RX ORDER — METRONIDAZOLE 500 MG/1
500 TABLET ORAL 2 TIMES DAILY
Qty: 14 TABLET | Refills: 0 | Status: SHIPPED | OUTPATIENT
Start: 2021-01-15 | End: 2021-01-22

## 2021-01-15 NOTE — PROGRESS NOTES
Santa is a 45 year old who is being evaluated via a billable telephone visit.      What phone number would you like to be contacted at? [phone]  How would you like to obtain your AVS? [employer]  Assessment & Plan     Pancreatic lesion  -Patient here today to talk about her CT findings and labs.  Overall her CT appears to have stable findings with the exception of a 0.6 cm cystic lesion along the tail of the pancreas.  Radiologist had noted that this could be a pseudocyst or cystic neoplasm a follow up CT or MRI was recommended in 2 years.  Left-sided pelvic varicosities were also mentioned.  Given findings I do recommend that she follow-up with a gastroenterologist to discuss if a biopsy is necessary given her extreme weight loss.  I am not certain if the weight loss is in relation to the pancreatic lesion.  I discussed this with her in detail today over the phone.  Patient will follow up with gastroenterology and I will will await recommendations.  - GASTROENTEROLOGY ADULT REF CONSULT ONLY; Future    TRACEY (generalized anxiety disorder)  -In terms of her anxiety patient was started on BuSpar last week.  She notes that she stopped it suddenly as it was causing her migraines.  Has a history of migraines.  We discussed going back to the 1 tablet once a day.  To see how she does.  -May need to consider an SSRI although she is not in favor of this.  -Continue counseling.  - busPIRone (BUSPAR) 5 MG tablet; Take 1 tablet (5 mg) by mouth daily    Intractable migraine without aura and with status migrainosus  -Induced by BuSpar.  Will monitor.    Weight Loss  Or weight loss I would like her to keep a journal of her food intake.  And we may need to possibly have her see nutrition.  She does note that she feels she is eating enough in a day.     Patient instructions sent via [employer] today.  With above-noted.      The patient indicates understanding of these issues and agrees with the plan.    Patient Instructions   - Start  Buspar once daily  - If you continue to get headaches from this please discontinue and follow up with me in clinic, otherwise I will see you in 2 weeks.   - Follow up with GI.       KALA Dee CNP  Questions or concerns please feel free to send me a [employer] message or call me  Phone : [phone]          Return in about 2 weeks (around 2/3/2021).    KALA Dee CNP  [facility] BOOM Pratt is a 45 year old who presents to clinic today for the following health issues     HPI     Test results follow up  Buspar, got a migraine with twice daily.          Review of Systems         Objective           Vitals:  No vitals were obtained today due to virtual visit.    Physical Exam   healthy, alert and no distress  PSYCH: Alert and oriented times 3; coherent speech, normal   rate and volume, able to articulate logical thoughts, able   to abstract reason, no tangential thoughts, no hallucinations   or delusions  Her affect is normal  RESP: No cough, no audible wheezing, able to talk in full sentences  Remainder of exam unable to be completed due to telephone visits    No results found.          Phone call duration: 11 minutes

## 2021-01-15 NOTE — TELEPHONE ENCOUNTER
RX sent.       KALA Dee CNP  Questions or concerns please feel free to send me a [employer] message or call me  Phone : [phone]

## 2021-01-18 ENCOUNTER — ANCILLARY PROCEDURE (OUTPATIENT)
Dept: CT IMAGING | Facility: CLINIC | Age: 46
End: 2021-01-18
Attending: NURSE PRACTITIONER
Payer: COMMERCIAL

## 2021-01-18 DIAGNOSIS — R63.4 WEIGHT LOSS: ICD-10-CM

## 2021-01-18 PROCEDURE — 74177 CT ABD & PELVIS W/CONTRAST: CPT | Mod: TC | Performed by: RADIOLOGY

## 2021-01-18 PROCEDURE — 71260 CT THORAX DX C+: CPT | Mod: TC | Performed by: RADIOLOGY

## 2021-01-18 RX ORDER — IOPAMIDOL 755 MG/ML
70 INJECTION, SOLUTION INTRAVASCULAR ONCE
Status: COMPLETED | OUTPATIENT
Start: 2021-01-18 | End: 2021-01-18

## 2021-01-18 RX ADMIN — IOPAMIDOL 70 ML: 755 INJECTION, SOLUTION INTRAVASCULAR at 15:06

## 2021-01-20 ENCOUNTER — VIRTUAL VISIT (OUTPATIENT)
Dept: FAMILY MEDICINE | Facility: OTHER | Age: 46
End: 2021-01-20
Payer: COMMERCIAL

## 2021-01-20 ENCOUNTER — TELEPHONE (OUTPATIENT)
Dept: FAMILY MEDICINE | Facility: OTHER | Age: 46
End: 2021-01-20

## 2021-01-20 DIAGNOSIS — G43.011 INTRACTABLE MIGRAINE WITHOUT AURA AND WITH STATUS MIGRAINOSUS: ICD-10-CM

## 2021-01-20 DIAGNOSIS — F41.1 GAD (GENERALIZED ANXIETY DISORDER): ICD-10-CM

## 2021-01-20 DIAGNOSIS — K86.9 PANCREATIC LESION: Primary | ICD-10-CM

## 2021-01-20 PROCEDURE — 99214 OFFICE O/P EST MOD 30 MIN: CPT | Mod: TEL | Performed by: NURSE PRACTITIONER

## 2021-01-20 RX ORDER — BUSPIRONE HYDROCHLORIDE 5 MG/1
5 TABLET ORAL DAILY
Qty: 30 TABLET | Refills: 0 | Status: SHIPPED | OUTPATIENT
Start: 2021-01-20 | End: 2021-02-03 | Stop reason: SINTOL

## 2021-01-20 NOTE — TELEPHONE ENCOUNTER
called pt and left message to call clinic back. Per checkout note need to schedule face to face or virtual appointment with Julia Christensen and give phone number for GI in [place] ([phone]).

## 2021-01-20 NOTE — PATIENT INSTRUCTIONS
- Start Buspar once daily  - If you continue to get headaches from this please discontinue and follow up with me in clinic, otherwise I will see you in 2 weeks.   - Follow up with KALA iPzarro CNP  Questions or concerns please feel free to send me a [employer] message or call me  Phone : [phone]

## 2021-02-02 ENCOUNTER — MYC MEDICAL ADVICE (OUTPATIENT)
Dept: FAMILY MEDICINE | Facility: OTHER | Age: 46
End: 2021-02-02

## 2021-02-02 NOTE — TELEPHONE ENCOUNTER
She can stop the Buspar. I would recommend a virtual or F2F to discuss the itching. In the meantime we can have an RN chat with her about this to be sure no urgent concerns. If none then recommend OTC care.       KALA Dee CNP  Questions or concerns please feel free to send me a [employer] message or call me  Phone : [phone]

## 2021-02-02 NOTE — TELEPHONE ENCOUNTER
Writer called to discuss with the patient further. LM for the patient to return call. Please review the message below with the patient. Transfer to triage.   Writer sent a [employer] message.     Nikolai Page RN, BSN  [place] [facility]  February 2, 2021

## 2021-02-03 ENCOUNTER — MYC MEDICAL ADVICE (OUTPATIENT)
Dept: FAMILY MEDICINE | Facility: OTHER | Age: 46
End: 2021-02-03

## 2021-02-03 ENCOUNTER — VIRTUAL VISIT (OUTPATIENT)
Dept: FAMILY MEDICINE | Facility: OTHER | Age: 46
End: 2021-02-03
Payer: COMMERCIAL

## 2021-02-03 DIAGNOSIS — L30.9 DERMATITIS: Primary | ICD-10-CM

## 2021-02-03 DIAGNOSIS — K21.9 GASTROESOPHAGEAL REFLUX DISEASE, UNSPECIFIED WHETHER ESOPHAGITIS PRESENT: ICD-10-CM

## 2021-02-03 DIAGNOSIS — R07.89 CHEST WALL PAIN: ICD-10-CM

## 2021-02-03 PROCEDURE — 99214 OFFICE O/P EST MOD 30 MIN: CPT | Mod: TEL | Performed by: NURSE PRACTITIONER

## 2021-02-03 RX ORDER — FAMOTIDINE 10 MG
10 TABLET ORAL 2 TIMES DAILY
Qty: 60 TABLET | Refills: 0 | Status: SHIPPED | OUTPATIENT
Start: 2021-02-03 | End: 2021-02-03

## 2021-02-03 RX ORDER — METHYLPREDNISOLONE 4 MG
TABLET, DOSE PACK ORAL
Qty: 21 TABLET | Refills: 0 | Status: SHIPPED | OUTPATIENT
Start: 2021-02-03 | End: 2021-02-17

## 2021-02-03 NOTE — PATIENT INSTRUCTIONS
- Start Prilosec once daily in the AM on an empty stomach  - Start Medrol dose jackie.   - Start Zyrtec daily for rash  - Stop any NSAIDS  - Follow up with me on Friday.        KALA Dee CNP  Questions or concerns please feel free to send me a [employer] message or call me  Phone : [phone]

## 2021-02-03 NOTE — PROGRESS NOTES
Santa is a 45 year old who is being evaluated via a billable telephone visit.      What phone number would you like to be contacted at?   How would you like to obtain your AVS? MyChart  Assessment & Plan     Dermatitis  - She feels it could be dry skin or even some reaction from the buspar.   - Uncertain etiology  - Recommend oral medrol dose jackie given systemic concern for dermatitis.   - Recommend Zyrtec daily     Gastroesophageal reflux disease, unspecified whether esophagitis present  - Restart Prilosec once daily  - Stop NSAIDS  - Monitor for trigger foods and follow up Friday to let me know how she is doing.   - Recommend GI consult for scope, she is seeing them in a couple weeks for her pancreatic lesion. Recommend discussing EGD/Colonoscopy as well.   - omeprazole (PRILOSEC) 20 MG DR capsule; Take 1 capsule (20 mg) by mouth daily              The patient indicates understanding of these issues and agrees with the plan.    Patient Instructions   - Start Prilosec once daily in the AM on an empty stomach  - Start Medrol dose jackie.   - Start Zyrtec daily for rash  - Stop any NSAIDS  - Follow up with me on Friday.        KALA Dee CNP  Questions or concerns please feel free to send me a [employer] message or call me  Phone : [phone]          No follow-ups on file.    KALA Dee CNP  [facility]     Santa is a 45 year old who presents to clinic today for the following health issues     HPI     Dermatitis and burning back, neck, chest. Goes everywhere. Stopped the Buspar.   No fever, no headaches.   Acid reflux when she wakes up and at night.     Review of Systems         Objective           Vitals:  No vitals were obtained today due to virtual visit.    Physical Exam   healthy, alert and no distress  PSYCH: Alert and oriented times 3; coherent speech, normal   rate and volume, able to articulate logical thoughts, able   to abstract reason, no tangential  thoughts, no hallucinations   or delusions  Her affect is normal  RESP: No cough, no audible wheezing, able to talk in full sentences  Remainder of exam unable to be completed due to telephone visits    Diagnostic: None         Phone call duration: 8 minutes

## 2021-02-03 NOTE — TELEPHONE ENCOUNTER
Attempted to reach the patient with the following information.  Left message for patient to return call to clinic.   My chart sent.    If she calls back-  Julia can work her in at 1230 if she returns call by 1145 so we can check her in- PLEASE TRANSFER her to the team for check in if virtual is not available send to [phone].   If its after 1145 she will have to schedule for Friday or with someone else.       Becca Schwartz MA

## 2021-02-08 ENCOUNTER — TELEPHONE (OUTPATIENT)
Dept: FAMILY MEDICINE | Facility: OTHER | Age: 46
End: 2021-02-08

## 2021-02-08 NOTE — TELEPHONE ENCOUNTER
Left message for pt to return our call      Pt was not seen today and needs a new telephone visit during her lunch.     CRISTIAN states she is welcome to wait until 12:40 next week Wednesday (I have her scheduled for that date) but CRISTIAN could also talk with her this Wednesday at 520 (cortney put a hold on this spot incase it worked. )    Please move and release appts as needed.

## 2021-02-12 NOTE — PROGRESS NOTES
Santa is a 45 year old who is being evaluated via a billable telephone visit.      What phone number would you like to be contacted at? [phone]  How would you like to obtain your AVS? MyChart    Assessment & Plan     Gastroesophageal reflux disease, unspecified whether esophagitis present  - Ongoing, worsened lately  - Recommend increasing Prilosec to twice daily, avoiding triggers.   - Advised to discuss EGD with GI when seeing them next week for pancreatic lesion.       Pancreatic lesion  - Following up with GI     Weight loss  - continues to struggle to gain weight.  - May want to consider selective serotonin reuptake inhibitor for anxiety as well has GI consult and possibly consulting with nutrition as well.     Dysuria  - Patient sent Mychart after appointment   - Requested urine as she feels she has a UTI  - I put in a wet prep and a UA.   - Will treat with Ciprofloxacin depending on results.   - Wet prep; Future  - *UA reflex to Microscopic and Culture ([place] and [facility] (except [place] and [place]); Future    The patient indicates understanding of these issues and agrees with the plan.    There are no Patient Instructions on file for this visit.    No follow-ups on file.    KALA Dee CNP  [facility]    Julian Pratt is a 45 year old who presents for the following health issues       HPI     Itching has gotten a little better, using hydrocortisone cream   heartburn is so bad at night that makes her not want to breath and she has to tell her self to relax, she takes tums and it makes it bearable         Review of Systems         Objective           Vitals:  No vitals were obtained today due to virtual visit.    Physical Exam   healthy, alert and no distress  PSYCH: Alert and oriented times 3; coherent speech, normal   rate and volume, able to articulate logical thoughts, able   to abstract reason, no tangential thoughts, no hallucinations   or delusions   Her affect is normal  RESP: No cough, no audible wheezing, able to talk in full sentences  Remainder of exam unable to be completed due to telephone visits    Diagnostic:       Phone call duration  7  minutes

## 2021-02-17 ENCOUNTER — MYC MEDICAL ADVICE (OUTPATIENT)
Dept: FAMILY MEDICINE | Facility: OTHER | Age: 46
End: 2021-02-17

## 2021-02-17 ENCOUNTER — VIRTUAL VISIT (OUTPATIENT)
Dept: FAMILY MEDICINE | Facility: OTHER | Age: 46
End: 2021-02-17
Payer: COMMERCIAL

## 2021-02-17 DIAGNOSIS — R30.0 DYSURIA: ICD-10-CM

## 2021-02-17 DIAGNOSIS — R63.4 WEIGHT LOSS: ICD-10-CM

## 2021-02-17 DIAGNOSIS — K86.9 PANCREATIC LESION: ICD-10-CM

## 2021-02-17 DIAGNOSIS — K21.9 GASTROESOPHAGEAL REFLUX DISEASE, UNSPECIFIED WHETHER ESOPHAGITIS PRESENT: Primary | ICD-10-CM

## 2021-02-17 PROCEDURE — 99213 OFFICE O/P EST LOW 20 MIN: CPT | Mod: TEL | Performed by: NURSE PRACTITIONER

## 2021-02-17 NOTE — TELEPHONE ENCOUNTER
[employer] message sent to patient.  See previous mychart for symptoms.  Sarah Shelby RN on 2/17/2021 at 5:44 PM

## 2021-02-17 NOTE — TELEPHONE ENCOUNTER
[employer] message sent with information on virtual visit.    Sarah Shelby RN on 2/17/2021 at 5:35 PM

## 2021-02-18 ENCOUNTER — TELEPHONE (OUTPATIENT)
Dept: FAMILY MEDICINE | Facility: OTHER | Age: 46
End: 2021-02-18

## 2021-02-18 DIAGNOSIS — R30.0 DYSURIA: ICD-10-CM

## 2021-02-18 LAB
ALBUMIN UR-MCNC: NEGATIVE MG/DL
APPEARANCE UR: CLEAR
BILIRUB UR QL STRIP: NEGATIVE
COLOR UR AUTO: YELLOW
GLUCOSE UR STRIP-MCNC: NEGATIVE MG/DL
HGB UR QL STRIP: ABNORMAL
KETONES UR STRIP-MCNC: NEGATIVE MG/DL
LEUKOCYTE ESTERASE UR QL STRIP: NEGATIVE
NITRATE UR QL: NEGATIVE
NON-SQ EPI CELLS #/AREA URNS LPF: NORMAL /LPF
PH UR STRIP: 6 PH (ref 5–7)
RBC #/AREA URNS AUTO: NORMAL /HPF
SOURCE: ABNORMAL
SP GR UR STRIP: 1.02 (ref 1–1.03)
SPECIMEN SOURCE: NORMAL
UROBILINOGEN UR STRIP-ACNC: 0.2 EU/DL (ref 0.2–1)
WBC #/AREA URNS AUTO: NORMAL /HPF
WET PREP SPEC: NORMAL

## 2021-02-18 PROCEDURE — 87210 SMEAR WET MOUNT SALINE/INK: CPT | Performed by: NURSE PRACTITIONER

## 2021-02-18 PROCEDURE — 81001 URINALYSIS AUTO W/SCOPE: CPT | Performed by: NURSE PRACTITIONER

## 2021-02-18 NOTE — TELEPHONE ENCOUNTER
I put in orders for a urine and wet prep. She had a virtual today so we will just add this to it. Please call her to schedule lab.     KALA Dee CNP  Questions or concerns please feel free to send me a [employer] message or call me  Phone : [phone]

## 2021-02-19 ENCOUNTER — OFFICE VISIT (OUTPATIENT)
Dept: FAMILY MEDICINE | Facility: OTHER | Age: 46
End: 2021-02-19
Payer: COMMERCIAL

## 2021-02-19 VITALS
DIASTOLIC BLOOD PRESSURE: 62 MMHG | RESPIRATION RATE: 16 BRPM | OXYGEN SATURATION: 99 % | HEART RATE: 89 BPM | SYSTOLIC BLOOD PRESSURE: 94 MMHG | TEMPERATURE: 99.8 F

## 2021-02-19 DIAGNOSIS — N95.2 ATROPHIC VAGINITIS: ICD-10-CM

## 2021-02-19 DIAGNOSIS — R30.0 DYSURIA: Primary | ICD-10-CM

## 2021-02-19 PROCEDURE — 99213 OFFICE O/P EST LOW 20 MIN: CPT | Performed by: NURSE PRACTITIONER

## 2021-02-19 RX ORDER — GLYCERIN/MIN OIL/POLYCARBOPHIL
1 GEL WITH APPLICATOR (GRAM) VAGINAL
Qty: 35 G | Refills: 0 | Status: SHIPPED | OUTPATIENT
Start: 2021-02-20 | End: 2022-01-26

## 2021-02-19 NOTE — PATIENT INSTRUCTIONS
- Start Replens to vaginal lubricant with intercourse and to vaginal area 2-3 times weekly.   - Recommend checking STD testing today.   - Follow up with gynecology if no resolution of symptoms.       KALA Dee CNP  Questions or concerns please feel free to send me a [employer] message or call me  Phone : [phone]      Patient Education     Atrophic Vaginitis    Atrophic vaginitis means the walls of your vagina have become thin. This happens when your body makes too little of the hormone estrogen. Menopause or surgical removal of the ovaries are the most common causes for a drop in estrogen. Breastfeeding can also cause the hormone level to drop.   Symptoms of atrophic vaginitis include:    Dryness, soreness, burning, or itching in the vagina    Vaginal discharge  Sex can be uncomfortable, even painful. After sex, you may have bleeding from your vagina. You may also have burning or pain when you urinate.   Home care  Your healthcare provider may recommend one or more of these as treatment:     Vaginal creams, lotions, and lubricants. These products help relieve vaginal dryness. They don t need a prescription. They can be found in the personal care section of most pharmacies. Creams and lotions are used daily to help keep the vagina moist. Lubricants help reduce dryness and pain during sex. Choose water-based lubricants. Don t use petroleum jelly, mineral oil, or other oils. These increase the chance of infection.    Hormone therapy (HT). HT increases the amount of estrogen in your body. This can help manage or relieve symptoms. HT can be given in pill form. It may be given as a lotion, cream, ring put into the vagina, or a patch on the skin. The risks and benefits of HT vary for each woman. For instance, your risk may be higher if you have had breast cancer. Discuss this treatment with your healthcare provider. Not every woman can use HT.  You don t need to stop having sex. In fact, regular sex can help  keep vaginal tissues healthy. Take steps to make sex more comfortable by using water-based lubricants.   Preventing infections  Atrophic vaginitis makes an infection of the vagina or the urinary tract more likely. To help reduce your risk:     Keep your genitals clean. When you bathe, wash the outside of your vagina with mild soap and water. Clean gently between the folds of your vagina.    Wipe from front to back after a bowel movement.    Don t douche unless your healthcare provider tells you to.    Don't use scented toilet paper, scented vaginal sprays, or scented tampons.    Don't wear clothes that are tight in the crotch. These include pantyhose, jeans, and leggings. Wear cotton underwear. Change it every day.  Follow-up care  Follow up with your healthcare provider, or as advised.  When to seek medical advice  Call your health care provider right away if any of these occur:    Fever of 100.4 F (38 C) or higher, or as directed by your healthcare provider    Symptoms don t go away or get worse even with treatment    Vaginal area swells or becomes painful    Vaginal area bleeds, but not because of your period    Bad-smelling discharge from the vagina    Pain or burning when you urinate or you have trouble passing urine    Open sores develop around vagina   [employer] last reviewed this educational content on 10/1/2019    7086-1907 [employer]. [address] [place] [address], [place] [address]. All rights reserved. This information is not intended as a substitute for professional medical care. Always follow your healthcare professional's instructions.

## 2021-02-19 NOTE — PROGRESS NOTES
Assessment & Plan     Dysuria  - UA from our virtual and wet prep were normal so I asked patient to come in for exam as she was leaving for [place].     Atrophic vaginitis  - Based on exam finding I recommend starting Replens three times weekly and PRN and follow up in 2-3 weeks to see if this helping her. If not then I recommend that we follow up with gynecology or urology. She has seen urology in the past.   - I do not see any infectious cause of her symptoms at this time.   - Vaginal Lubricant (REPLENS) GEL; Place 1 Application vaginally three times a week    Weight Loss  Plans to follow up with GI next week in regards to her pancreatic lesion/cyst.   Continue on Prilosec daily to see if this helps with her acid reflux as well.   We will need to consider nutrition consult as well.     GERD  - As noted above   - Stop NSAID use     The patient indicates understanding of these issues and agrees with the plan.    Patient Instructions   - Start Replens to vaginal lubricant with intercourse and to vaginal area 2-3 times weekly.   - Recommend checking STD testing today.   - Follow up with gynecology if no resolution of symptoms.       KALA Dee CNP  Questions or concerns please feel free to send me a [employer] message or call me  Phone : [phone]      Patient Education     Atrophic Vaginitis    Atrophic vaginitis means the walls of your vagina have become thin. This happens when your body makes too little of the hormone estrogen. Menopause or surgical removal of the ovaries are the most common causes for a drop in estrogen. Breastfeeding can also cause the hormone level to drop.   Symptoms of atrophic vaginitis include:    Dryness, soreness, burning, or itching in the vagina    Vaginal discharge  Sex can be uncomfortable, even painful. After sex, you may have bleeding from your vagina. You may also have burning or pain when you urinate.   Home care  Your healthcare provider may recommend one or more of  these as treatment:     Vaginal creams, lotions, and lubricants. These products help relieve vaginal dryness. They don t need a prescription. They can be found in the personal care section of most pharmacies. Creams and lotions are used daily to help keep the vagina moist. Lubricants help reduce dryness and pain during sex. Choose water-based lubricants. Don t use petroleum jelly, mineral oil, or other oils. These increase the chance of infection.    Hormone therapy (HT). HT increases the amount of estrogen in your body. This can help manage or relieve symptoms. HT can be given in pill form. It may be given as a lotion, cream, ring put into the vagina, or a patch on the skin. The risks and benefits of HT vary for each woman. For instance, your risk may be higher if you have had breast cancer. Discuss this treatment with your healthcare provider. Not every woman can use HT.  You don t need to stop having sex. In fact, regular sex can help keep vaginal tissues healthy. Take steps to make sex more comfortable by using water-based lubricants.   Preventing infections  Atrophic vaginitis makes an infection of the vagina or the urinary tract more likely. To help reduce your risk:     Keep your genitals clean. When you bathe, wash the outside of your vagina with mild soap and water. Clean gently between the folds of your vagina.    Wipe from front to back after a bowel movement.    Don t douche unless your healthcare provider tells you to.    Don't use scented toilet paper, scented vaginal sprays, or scented tampons.    Don't wear clothes that are tight in the crotch. These include pantyhose, jeans, and leggings. Wear cotton underwear. Change it every day.  Follow-up care  Follow up with your healthcare provider, or as advised.  When to seek medical advice  Call your health care provider right away if any of these occur:    Fever of 100.4 F (38 C) or higher, or as directed by your healthcare provider    Symptoms don t go  away or get worse even with treatment    Vaginal area swells or becomes painful    Vaginal area bleeds, but not because of your period    Bad-smelling discharge from the vagina    Pain or burning when you urinate or you have trouble passing urine    Open sores develop around vagina   [employer] last reviewed this educational content on 10/1/2019    1294-9718 [employer]. [address] [place] [address], [place] [address]. All rights reserved. This information is not intended as a substitute for professional medical care. Always follow your healthcare professional's instructions.               Return in about 3 days (around 2/22/2021) for recheck symptoms.    KALA Dee CNP  [facility] BOOM Pratt is a 45 year old who presents for the following health issues     HPI     Hurts when she urinates  Not all the time.   Along the outside urethra.   Yellow urine, with a little odor  UA and Wet prep from yesterday normal.       Review of Systems         Objective    BP 94/62   Pulse 89   Temp 99.8  F (37.7  C) (Temporal)   Resp 16   SpO2 99%   There is no height or weight on file to calculate BMI.  Physical Exam  Abdominal:      Hernia: There is no hernia in the left inguinal area or right inguinal area.   Genitourinary:     Labia:         Right: No rash, tenderness, lesion or injury.         Left: No rash, tenderness, lesion or injury.       Urethra: No urethral pain or urethral swelling.      Vagina: No vaginal discharge.            Orders Only on 02/18/2021   Component Date Value Ref Range Status     Color Urine 02/18/2021 Yellow   Final     Appearance Urine 02/18/2021 Clear   Final     Glucose Urine 02/18/2021 Negative  NEG^Negative mg/dL Final     Bilirubin Urine 02/18/2021 Negative  NEG^Negative Final     Ketones Urine 02/18/2021 Negative  NEG^Negative mg/dL Final     Specific Gravity Urine 02/18/2021 1.020  1.003 - 1.035 Final     Blood Urine 02/18/2021 Trace*  NEG^Negative Final     pH Urine 02/18/2021 6.0  5.0 - 7.0 pH Final     Protein Albumin Urine 02/18/2021 Negative  NEG^Negative mg/dL Final     Urobilinogen Urine 02/18/2021 0.2  0.2 - 1.0 EU/dL Final     Nitrite Urine 02/18/2021 Negative  NEG^Negative Final     Leukocyte Esterase Urine 02/18/2021 Negative  NEG^Negative Final     Source 02/18/2021 Midstream Urine   Final     Specimen Description 02/18/2021 Vagina   Final     Wet Prep 02/18/2021 No Trichomonas seen   Final     Wet Prep 02/18/2021 No clue cells seen   Final     Wet Prep 02/18/2021 No yeast seen   Final     Wet Prep 02/18/2021    Final                    Value:WBC'S seen  Few       WBC Urine 02/18/2021 0 - 5  OTO5^0 - 5 /HPF Final     RBC Urine 02/18/2021 O - 2  OTO2^O - 2 /HPF Final     Squamous Epithelial /LPF Urine 02/18/2021 Few  FEW^Few /LPF Final

## 2021-02-22 ENCOUNTER — VIRTUAL VISIT (OUTPATIENT)
Dept: GASTROENTEROLOGY | Facility: CLINIC | Age: 46
End: 2021-02-22
Attending: NURSE PRACTITIONER
Payer: COMMERCIAL

## 2021-02-22 DIAGNOSIS — K21.9 GASTROESOPHAGEAL REFLUX DISEASE, UNSPECIFIED WHETHER ESOPHAGITIS PRESENT: ICD-10-CM

## 2021-02-22 DIAGNOSIS — K86.9 PANCREATIC LESION: ICD-10-CM

## 2021-02-22 DIAGNOSIS — K62.5 BRBPR (BRIGHT RED BLOOD PER RECTUM): ICD-10-CM

## 2021-02-22 DIAGNOSIS — R63.4 WEIGHT LOSS: Primary | ICD-10-CM

## 2021-02-22 PROCEDURE — 99203 OFFICE O/P NEW LOW 30 MIN: CPT | Mod: TEL | Performed by: INTERNAL MEDICINE

## 2021-02-22 NOTE — PROGRESS NOTES
HPI:    Santa  presents today for a telephone visit to discuss a pancreatic cyst which was seen on a CT in January that was done for unintentional weight loss.  Has lost 30lbs over the 6 months unintentionally. No pain with eating, no issues with nausea or vomiting. Doesn't think she is eating less than normal and is generally eating the same things that she was before. Weight has been stable lately but hasn't been able to gain weight back. Has been struggling with acid reflux  - was recently started on omeprazole which seems to help if she takes it. No nausea or vomiting.  No early satiety. Has chronic constipation but no change from before.     Occasional blood in the stool - bright red with wiping - thinks its from hemorrhoids. Has been going on for years.     No family history of pancreatic malignancies. No history of colon or stomach issues.     Does struggle with stress/anxiety - seems like it has been better lately.    Occasional NSAIDs ( 2 times a week).    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     Other, mixed, or unspecified nondependent drug abuse, unspecified     Drug /ETOH abuse (non-depend.)       Past Surgical History:   Procedure Laterality Date     C APPENDECTOMY         Family History   Problem Relation Age of Onset     Cancer Paternal Grandfather      Breast Cancer Paternal Aunt      Seizure Disorder Son        Social History     Tobacco Use     Smoking status: Current Every Day Smoker     Packs/day: 0.75     Years: 8.00     Pack years: 6.00     Types: Cigarettes     Smokeless tobacco: Never Used     Tobacco comment: smokes only when stressed out    Substance Use Topics     Alcohol use: No     CT:  IMPRESSION:  1.  0.6 cm cystic lesion in the tail of the pancreas could represent a  cyst,  pseudocyst, or cystic neoplasm. Follow-up CT or MRI of the  pancreas recommended in 2 years.  2.  Left-sided pelvic varicosities.     Assessment and Plan:    # pancreatic cyst - likely IPMN - discussed need for monitoring.  No family history of pancreatic malignancies that she is aware of but is a current smoker.  Will plan for MRCP in 6 months to further evaluate - pending results may start to space out.    # weight loss - no localizing GI symptoms aside from worsening GERD.  Weight has stabilized but isn't able to gain any back - will refer to nutrition for further evaluation.  Additionally, will plan for EGD/colonoscopy.     # rectal bleeding - likely hemorrhoidal but should be further evaluated with colonoscopy given longstanding persistent symptoms    # GERD - EGD as above    RTC after testing    Mikaela Olmedo DO     Phone call duration: 16 minutes

## 2021-02-22 NOTE — PATIENT INSTRUCTIONS
Please call [phone] to schedule an endoscopy if you don't hear from them in a few days - you will need a pre-op with your doctor and a covid test a few days before the procedure.    You should have an MRI done in about May to follow-up your pancreatic cyst.    Please see the nutritionist.

## 2021-02-22 NOTE — PROGRESS NOTES
Santa is a 45 year old who is being evaluated via a billable telephone visit.      What phone number would you like to be contacted at?[phone]   How would you like to obtain your AVS? MyChart  Phone call duration:  Minutes  Jameson Obregon CMA

## 2021-02-23 ENCOUNTER — TELEPHONE (OUTPATIENT)
Dept: GASTROENTEROLOGY | Facility: CLINIC | Age: 46
End: 2021-02-23

## 2021-02-23 NOTE — TELEPHONE ENCOUNTER
Per last visit with Dr Olmedo:      Check Out Comments: Needs MRCP around May, nutrition referral and EGD/colonoscopy with [facility]      Called patient and left message providing call center number for MRI and nutrition referral.  Provided Endoscopy scheduling number for the [facility] EGD/and colonoscopy.    *Call center when patient returns call please assist in scheduling referral to nutrition and MRI.        Anna Tobin  Surgical Specialties Procedure   [facility] [place]  2/23/2021 10:44 AM

## 2021-03-08 ENCOUNTER — OFFICE VISIT (OUTPATIENT)
Dept: FAMILY MEDICINE | Facility: OTHER | Age: 46
End: 2021-03-08
Payer: COMMERCIAL

## 2021-03-08 VITALS
HEART RATE: 82 BPM | WEIGHT: 105.4 LBS | SYSTOLIC BLOOD PRESSURE: 102 MMHG | OXYGEN SATURATION: 100 % | HEIGHT: 63 IN | BODY MASS INDEX: 18.68 KG/M2 | DIASTOLIC BLOOD PRESSURE: 72 MMHG | RESPIRATION RATE: 16 BRPM | TEMPERATURE: 97.2 F

## 2021-03-08 DIAGNOSIS — E78.5 HYPERLIPIDEMIA LDL GOAL <160: ICD-10-CM

## 2021-03-08 DIAGNOSIS — K21.00 GASTROESOPHAGEAL REFLUX DISEASE WITH ESOPHAGITIS WITHOUT HEMORRHAGE: ICD-10-CM

## 2021-03-08 DIAGNOSIS — F17.200 TOBACCO USE DISORDER: ICD-10-CM

## 2021-03-08 DIAGNOSIS — R63.4 WEIGHT LOSS: ICD-10-CM

## 2021-03-08 DIAGNOSIS — Z01.818 PREOP GENERAL PHYSICAL EXAM: Primary | ICD-10-CM

## 2021-03-08 DIAGNOSIS — K86.9 PANCREATIC LESION: ICD-10-CM

## 2021-03-08 DIAGNOSIS — F41.9 ANXIETY: ICD-10-CM

## 2021-03-08 LAB
ALBUMIN SERPL-MCNC: 3.9 G/DL (ref 3.4–5)
ALP SERPL-CCNC: 64 U/L (ref 40–150)
ALT SERPL W P-5'-P-CCNC: 18 U/L (ref 0–50)
ANION GAP SERPL CALCULATED.3IONS-SCNC: 1 MMOL/L (ref 3–14)
AST SERPL W P-5'-P-CCNC: 10 U/L (ref 0–45)
BASOPHILS # BLD AUTO: 0 10E9/L (ref 0–0.2)
BASOPHILS NFR BLD AUTO: 0.3 %
BILIRUB SERPL-MCNC: 0.3 MG/DL (ref 0.2–1.3)
BUN SERPL-MCNC: 11 MG/DL (ref 7–30)
CALCIUM SERPL-MCNC: 8.9 MG/DL (ref 8.5–10.1)
CHLORIDE SERPL-SCNC: 105 MMOL/L (ref 94–109)
CO2 SERPL-SCNC: 32 MMOL/L (ref 20–32)
CREAT SERPL-MCNC: 0.91 MG/DL (ref 0.52–1.04)
DIFFERENTIAL METHOD BLD: NORMAL
EOSINOPHIL # BLD AUTO: 0.1 10E9/L (ref 0–0.7)
EOSINOPHIL NFR BLD AUTO: 1.5 %
ERYTHROCYTE [DISTWIDTH] IN BLOOD BY AUTOMATED COUNT: 12.2 % (ref 10–15)
GFR SERPL CREATININE-BSD FRML MDRD: 77 ML/MIN/{1.73_M2}
GLUCOSE SERPL-MCNC: 82 MG/DL (ref 70–99)
HCT VFR BLD AUTO: 38.2 % (ref 35–47)
HGB BLD-MCNC: 12.9 G/DL (ref 11.7–15.7)
LYMPHOCYTES # BLD AUTO: 2.6 10E9/L (ref 0.8–5.3)
LYMPHOCYTES NFR BLD AUTO: 33.5 %
MCH RBC QN AUTO: 30.3 PG (ref 26.5–33)
MCHC RBC AUTO-ENTMCNC: 33.8 G/DL (ref 31.5–36.5)
MCV RBC AUTO: 90 FL (ref 78–100)
MONOCYTES # BLD AUTO: 0.6 10E9/L (ref 0–1.3)
MONOCYTES NFR BLD AUTO: 7.9 %
NEUTROPHILS # BLD AUTO: 4.5 10E9/L (ref 1.6–8.3)
NEUTROPHILS NFR BLD AUTO: 56.8 %
PLATELET # BLD AUTO: 312 10E9/L (ref 150–450)
POTASSIUM SERPL-SCNC: 3.8 MMOL/L (ref 3.4–5.3)
PROT SERPL-MCNC: 7.3 G/DL (ref 6.8–8.8)
RBC # BLD AUTO: 4.26 10E12/L (ref 3.8–5.2)
SODIUM SERPL-SCNC: 138 MMOL/L (ref 133–144)
WBC # BLD AUTO: 7.9 10E9/L (ref 4–11)

## 2021-03-08 PROCEDURE — 36415 COLL VENOUS BLD VENIPUNCTURE: CPT | Performed by: NURSE PRACTITIONER

## 2021-03-08 PROCEDURE — 80053 COMPREHEN METABOLIC PANEL: CPT | Performed by: NURSE PRACTITIONER

## 2021-03-08 PROCEDURE — 99214 OFFICE O/P EST MOD 30 MIN: CPT | Performed by: NURSE PRACTITIONER

## 2021-03-08 PROCEDURE — 85025 COMPLETE CBC W/AUTO DIFF WBC: CPT | Performed by: NURSE PRACTITIONER

## 2021-03-08 ASSESSMENT — PAIN SCALES - GENERAL: PAINLEVEL: NO PAIN (0)

## 2021-03-08 ASSESSMENT — MIFFLIN-ST. JEOR: SCORE: 1102.22

## 2021-03-08 ASSESSMENT — ENCOUNTER SYMPTOMS
MUSCULOSKELETAL NEGATIVE: 1
DIARRHEA: 1
EYES NEGATIVE: 1
CONSTITUTIONAL NEGATIVE: 1
RESPIRATORY NEGATIVE: 1
PSYCHIATRIC NEGATIVE: 1
CONSTIPATION: 1
NEUROLOGICAL NEGATIVE: 1
CARDIOVASCULAR NEGATIVE: 1

## 2021-03-08 NOTE — PROGRESS NOTES
[facility]  [address]  [place] [address]  Phone: [phone]  Primary Provider: Jackson Christensen  Pre-op Performing Provider: JACKSON CHRISTENSEN      PREOPERATIVE EVALUATION:  Today's date: 3/8/2021    Santa Kinsey is a 45 year old female who presents for a preoperative evaluation.    Surgical Information:  Surgery/Procedure: Colonoscopy/EGD  Surgery Location: [facility]  Surgeon: Mikaela Olmedo DO  Surgery Date: 4/2/21  Time of Surgery: 12:30pm  Where patient plans to recover: At home with family  Fax number for surgical facility: Note does not need to be faxed, will be available electronically in Epic.    Type of Anesthesia Anticipated: Local with MAC    Assessment & Plan     The proposed surgical procedure is considered INTERMEDIATE risk.    Preop general physical exam    - CBC with platelets and differential  - Comprehensive metabolic panel (BMP + Alb, Alk Phos, ALT, AST, Total. Bili, TP)    Weight loss  - Unintentional weight loss, has lost over 30lbs in 6 months unintentionally. No pain with eating and no nausea or vomiting. Worsening Acid reflux.   - CT abdomen and pelvis revealed pancreatic lesion/cyst, referred to GI to have repeat imaging   - History of ETOH abuse and meth, has been sober for 18 years.     Pancreatic lesion  - Per GI likely to be IPMN, monitor and have MRCP in 6 months to further evaluate.     Gastroesophageal reflux disease with esophagitis without hemorrhage  - EGD planned.     Hyperlipidemia LDL goal <160      Tobacco use disorder  - Encouraged smoking cessation as it relates to acid reflux and overall health    Anxiety  - Worsened lately, had to stop Buspar as it gave her headaches.  - Would recommend considering selective serotonin reuptake inhibitor. She had declined this previously.          Possible Sleep Apnea: Low risk score 1.        Risks and Recommendations:  The patient has the following additional risks  and recommendations for perioperative complications:  Pulmonary:    - Incentive spirometry post-op   - Smoking cessation recommended.     Medication Instructions:  Patient is to take all scheduled medications on the day of surgery    RECOMMENDATION:  APPROVAL GIVEN to proceed with proposed procedure, with negative Covid-19 testing.       Subjective     HPI related to upcoming procedure:   Ongoing worsening Acid reflux with unintentional weight loss.     Preop Questions 3/8/2021   1. Have you ever had a heart attack or stroke? No   2. Have you ever had surgery on your heart or blood vessels, such as a stent placement, a coronary artery bypass, or surgery on an artery in your head, neck, heart, or legs? No   3. Do you have chest pain with activity? No   4. Do you have a history of  heart failure? No   5. Do you currently have a cold, bronchitis or symptoms of other infection? No   6. Do you have a cough, shortness of breath, or wheezing? No   7. Do you or anyone in your family have previous history of blood clots? No   8. Do you or does anyone in your family have a serious bleeding problem such as prolonged bleeding following surgeries or cuts? No   9. Have you ever had problems with anemia or been told to take iron pills? No   10. Have you had any abnormal blood loss such as black, tarry or bloody stools, or abnormal vaginal bleeding? YES - Blood in the stools- 6 months ago   11. Have you ever had a blood transfusion? No   12. Are you willing to have a blood transfusion if it is medically needed before, during, or after your surgery? NO -   13. Have you or any of your relatives ever had problems with anesthesia? No   14. Do you have sleep apnea, excessive snoring or daytime drowsiness? YES - Snoring   14a. Do you have a CPAP machine? No   15. Do you have any artifical heart valves or other implanted medical devices like a pacemaker, defibrillator, or continuous glucose monitor? No   16. Do you have artificial joints?  No   17. Are you allergic to latex? No   18. Is there any chance that you may be pregnant? No       Health Care Directive:  Patient does not have a Health Care Directive or Living Will: Discussed advance care planning with patient; however, patient declined at this time.    Preoperative Review of :   reviewed - no record of controlled substances prescribed.      Status of Chronic Conditions:  See problem list for active medical problems.  Problems all longstanding and stable, except as noted/documented.  See ROS for pertinent symptoms related to these conditions.      Review of Systems   Constitutional: Negative.    HENT: Negative.    Eyes: Negative.    Respiratory: Negative.    Cardiovascular: Negative.    Gastrointestinal: Positive for constipation and diarrhea.   Genitourinary: Negative.    Musculoskeletal: Negative.    Neurological: Negative.    Psychiatric/Behavioral: Negative.          Patient Active Problem List    Diagnosis Date Noted     Plantar verruca 10/24/2017     Priority: Medium     Encounter for smoking cessation counseling 03/26/2016     Priority: Medium     Tobacco use disorder 03/26/2016     Priority: Medium     Intractable migraine without aura and with status migrainosus 01/12/2016     Priority: Medium     ASCUS of cervix with negative high risk HPV 08/19/2015     Priority: Medium     8/19/15 ASCUS pap, neg HPV. Plan: cotest in 3 years.  7/26/18 NIL pap, neg HR HPV. Plan: cotest in 5 years.       Anxiety 10/14/2011     Priority: Medium     Hyperlipidemia LDL goal <160 09/09/2011     Priority: Medium     Encounter for long-term current use of medication 11/12/2004     Priority: Medium     Problem list name updated by automated process. 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     Other, mixed, or unspecified nondependent drug abuse,  "unspecified     Drug /ETOH abuse (non-depend.)     Past Surgical History:   Procedure Laterality Date     C APPENDECTOMY       Current Outpatient Medications   Medication Sig Dispense Refill     omeprazole (PRILOSEC) 20 MG DR capsule Take 1 capsule (20 mg) by mouth daily 90 capsule 1     IBUPROFEN PO        Vaginal Lubricant (REPLENS) GEL Place 1 Application vaginally three times a week (Patient not taking: Reported on 3/8/2021) 35 g 0       Allergies   Allergen Reactions     No Known Drug Allergies         Social History     Tobacco Use     Smoking status: Current Every Day Smoker     Packs/day: 0.75     Years: 8.00     Pack years: 6.00     Types: Cigarettes     Smokeless tobacco: Never Used     Tobacco comment: smokes only when stressed out    Substance Use Topics     Alcohol use: No     Family History   Problem Relation Age of Onset     Cancer Paternal Grandfather      Breast Cancer Paternal Aunt      Seizure Disorder Son      History   Drug Use No         Objective     /72 (BP Location: Right arm, Patient Position: Chair, Cuff Size: Adult Regular)   Pulse 82   Temp 97.2  F (36.2  C) (Temporal)   Resp 16   Ht 1.6 m (5' 3\")   Wt 47.8 kg (105 lb 6.4 oz)   LMP 02/22/2021   SpO2 100%   BMI 18.67 kg/m      Physical Exam    GENERAL APPEARANCE: healthy, alert and no distress     EYES: EOMI, PERRL     HENT: ear canals and TM's normal and nose and mouth without ulcers or lesions     NECK: no adenopathy, no asymmetry, masses, or scars and thyroid normal to palpation     RESP: lungs clear to auscultation - no rales, rhonchi or wheezes     CV: regular rates and rhythm, normal S1 S2, no S3 or S4 and no murmur, click or rub     ABDOMEN:  soft, nontender, no HSM or masses and bowel sounds normal     MS: extremities normal- no gross deformities noted, no evidence of inflammation in joints, FROM in all extremities.     SKIN: no suspicious lesions or rashes     NEURO: Normal strength and tone, sensory exam grossly " normal, mentation intact and speech normal     PSYCH: mentation appears normal. and affect normal/bright     LYMPHATICS: No cervical adenopathy    Recent Labs   Lab Test 01/13/21  1715 09/18/20  1704   HGB 11.8 13.3    291    138   POTASSIUM 3.6 3.3*   CR 0.79 0.93        Diagnostics:  Recent Results (from the past 168 hour(s))   CBC with platelets and differential    Collection Time: 03/08/21  5:12 PM   Result Value Ref Range    WBC 7.9 4.0 - 11.0 10e9/L    RBC Count 4.26 3.8 - 5.2 10e12/L    Hemoglobin 12.9 11.7 - 15.7 g/dL    Hematocrit 38.2 35.0 - 47.0 %    MCV 90 78 - 100 fl    MCH 30.3 26.5 - 33.0 pg    MCHC 33.8 31.5 - 36.5 g/dL    RDW 12.2 10.0 - 15.0 %    Platelet Count 312 150 - 450 10e9/L    % Neutrophils 56.8 %    % Lymphocytes 33.5 %    % Monocytes 7.9 %    % Eosinophils 1.5 %    % Basophils 0.3 %    Absolute Neutrophil 4.5 1.6 - 8.3 10e9/L    Absolute Lymphocytes 2.6 0.8 - 5.3 10e9/L    Absolute Monocytes 0.6 0.0 - 1.3 10e9/L    Absolute Eosinophils 0.1 0.0 - 0.7 10e9/L    Absolute Basophils 0.0 0.0 - 0.2 10e9/L    Diff Method Automated Method    Comprehensive metabolic panel (BMP + Alb, Alk Phos, ALT, AST, Total. Bili, TP)    Collection Time: 03/08/21  5:12 PM   Result Value Ref Range    Sodium 138 133 - 144 mmol/L    Potassium 3.8 3.4 - 5.3 mmol/L    Chloride 105 94 - 109 mmol/L    Carbon Dioxide 32 20 - 32 mmol/L    Anion Gap 1 (L) 3 - 14 mmol/L    Glucose 82 70 - 99 mg/dL    Urea Nitrogen 11 7 - 30 mg/dL    Creatinine 0.91 0.52 - 1.04 mg/dL    GFR Estimate 77 >60 mL/min/[1.73_m2]    GFR Estimate If Black 89 >60 mL/min/[1.73_m2]    Calcium 8.9 8.5 - 10.1 mg/dL    Bilirubin Total 0.3 0.2 - 1.3 mg/dL    Albumin 3.9 3.4 - 5.0 g/dL    Protein Total 7.3 6.8 - 8.8 g/dL    Alkaline Phosphatase 64 40 - 150 U/L    ALT 18 0 - 50 U/L    AST 10 0 - 45 U/L      No EKG required, no history of coronary heart disease, significant arrhythmia, peripheral arterial disease or other structural heart  disease.    Revised Cardiac Risk Index (RCRI):  The patient has the following serious cardiovascular risks for perioperative complications:   - No serious cardiac risks = 0 points     RCRI Interpretation: 0 points: Class I (very low risk - 0.4% complication rate)             Signed Electronically by: KALA Dee CNP  Copy of this evaluation report is provided to requesting physician.    [facility] Preop Guidelines    Revised Cardiac Risk Index

## 2021-03-08 NOTE — PATIENT INSTRUCTIONS
-   Preparing for Your Surgery  Getting started  A nurse will call you to review your health history and instructions. They will give you an arrival time based on your scheduled surgery time.  Please be ready to share the following:    Your doctor's clinic name and phone number    Your medical, surgical and anesthesia history    A list of allergies and sensitivities    A list of medicines, including herbal treatments and over-the-counter drugs    Whether the patient has a legal guardian (ask how to send us the papers in advance)  If you have a child who's having surgery, please ask for a copy of Preparing for Your Child's Surgery.    Preparing for surgery    Within 30 days of surgery: Have a pre-op exam (sometimes called an H&P, or History and Physical). This can be done at a clinic or pre-operative center.  ? If you're having a , you may not need this exam. Talk to your care team    At your pre-op exam, talk to your care team about all medicines you take. If you need to stop any medicines before surgery, ask when to start taking them again.  ? We do this for your safety. Many medicines can make you bleed too much during surgery. Some change how well surgery (anesthesia) drugs work.    Call your insurance company to let them know you're having surgery. (If you don't have insurance, call [phone].)    Call your clinic if there's any change in your health. This includes signs of a cold or flu (sore throat, runny nose, cough, rash, fever). It also includes a scrape or scratch near the surgery site.    If you have questions on the day of surgery, call your hospital or surgery center.  Eating and drinking guidelines  For your safety: Unless your surgeon tells you otherwise, follow the guidelines below.    Eat and drink as usual until 8 hours before surgery. After that, no food or milk.    Drink clear liquids until 2 hours before surgery. These are liquids you can see through, like water, Gatorade and Propel  Water. You may also have black coffee and tea (no cream or milk).    Nothing by mouth within 2 hours of surgery. This includes gum, candy and breath mints.    If you drink, stop drinking alcohol the night before surgery.    If your care team tells you to take medicine on the morning of surgery, it's okay to take it with a sip of water.  Preventing infection    Shower or bathe the night before and morning of your surgery. Follow the instructions your clinic gave you. (If no instructions, use regular soap.)    Don't shave or clip hair near your surgery site. We'll remove the hair if needed.    Don't smoke or vape the morning of surgery. You may chew nicotine gum up to 2 hours before surgery. A nicotine patch is okay.  ? Note: Some surgeries require you to completely quit smoking and nicotine. Check with your surgeon.    Your care team will make every effort to keep you safe from infection. We will:  ? Clean our hands often with soap and water (or an alcohol-based hand rub).  ? Clean the skin at your surgery site with a special soap that kills germs.  ? Give you a special gown to keep you warm. (Cold raises the risk of infection.)  ? Wear special hair covers, masks, gowns and gloves during surgery.  ? Give antibiotic medicine, if prescribed. Not all surgeries need antibiotics.  What to bring on the day of surgery    Photo ID and insurance card    Copy of your health care directive, if you have one    Glasses and hearing aides (bring cases)  ? You can't wear contacts during surgery    Inhaler and eye drops, if you use them (tell us about these when you arrive)    CPAP machine or breathing device, if you use them    A few personal items, if spending the night    If you have . . .  ? A pacemaker or ICD (cardiac defibrillator): Bring the ID card.  ? An implanted stimulator: Bring the remote control.  ? A legal guardian: Bring a copy of the certified (court-stamped) guardianship papers.  Please remove any jewelry, including  body piercings. Leave jewelry and other valuables at home.  If you're going home the day of surgery  Important: If you don't follow the rules below, we must cancel your surgery.     Arrange for someone to drive you home after surgery. You may not drive, take a taxi or take public transportation by yourself (unless you'll have local anesthesia only).    Arrange for a responsible adult to stay with you overnight. If you don't, we may keep you in the hospital overnight, and you may need to pay the costs yourself.  Questions?   If you have any questions for your care team, list them here: _________________________________________________________________________________________________________________________________________________________________________________________________________________________________________________________________________________________________________________________  For informational purposes only. Not to replace the advice of your health care provider. Copyright   2003, 2019 [facility]. All rights reserved. Clinically reviewed by Trina Justin MD. [employer] [id number] - REV 4/20.

## 2021-03-22 DIAGNOSIS — Z11.59 ENCOUNTER FOR SCREENING FOR OTHER VIRAL DISEASES: ICD-10-CM

## 2021-03-25 RX ORDER — BISACODYL 5 MG/1
15 TABLET, DELAYED RELEASE ORAL SEE ADMIN INSTRUCTIONS
Qty: 3 TABLET | Refills: 0 | Status: SHIPPED | OUTPATIENT
Start: 2021-03-25 | End: 2021-11-11

## 2021-03-25 RX ORDER — SODIUM, POTASSIUM,MAG SULFATES 17.5-3.13G
2 SOLUTION, RECONSTITUTED, ORAL ORAL SEE ADMIN INSTRUCTIONS
Qty: 354 ML | Refills: 0 | Status: SHIPPED | OUTPATIENT
Start: 2021-03-25 | End: 2022-08-26

## 2021-03-30 DIAGNOSIS — Z11.59 ENCOUNTER FOR SCREENING FOR OTHER VIRAL DISEASES: ICD-10-CM

## 2021-03-30 LAB
SARS-COV-2 RNA RESP QL NAA+PROBE: NORMAL
SPECIMEN SOURCE: NORMAL

## 2021-03-30 PROCEDURE — U0005 INFEC AGEN DETEC AMPLI PROBE: HCPCS | Performed by: INTERNAL MEDICINE

## 2021-03-30 PROCEDURE — U0003 INFECTIOUS AGENT DETECTION BY NUCLEIC ACID (DNA OR RNA); SEVERE ACUTE RESPIRATORY SYNDROME CORONAVIRUS 2 (SARS-COV-2) (CORONAVIRUS DISEASE [COVID-19]), AMPLIFIED PROBE TECHNIQUE, MAKING USE OF HIGH THROUGHPUT TECHNOLOGIES AS DESCRIBED BY CMS-2020-01-R: HCPCS | Performed by: INTERNAL MEDICINE

## 2021-03-31 LAB
LABORATORY COMMENT REPORT: NORMAL
SARS-COV-2 RNA RESP QL NAA+PROBE: NEGATIVE
SPECIMEN SOURCE: NORMAL

## 2021-04-01 ENCOUNTER — ANESTHESIA EVENT (OUTPATIENT)
Dept: SURGERY | Facility: AMBULATORY SURGERY CENTER | Age: 46
End: 2021-04-01

## 2021-04-02 ENCOUNTER — ANESTHESIA (OUTPATIENT)
Dept: SURGERY | Facility: AMBULATORY SURGERY CENTER | Age: 46
End: 2021-04-02

## 2021-04-02 ENCOUNTER — HOSPITAL ENCOUNTER (OUTPATIENT)
Facility: AMBULATORY SURGERY CENTER | Age: 46
Discharge: HOME | End: 2021-04-02
Attending: INTERNAL MEDICINE | Admitting: INTERNAL MEDICINE
Payer: COMMERCIAL

## 2021-04-02 VITALS
OXYGEN SATURATION: 98 % | SYSTOLIC BLOOD PRESSURE: 101 MMHG | DIASTOLIC BLOOD PRESSURE: 65 MMHG | RESPIRATION RATE: 16 BRPM | HEART RATE: 75 BPM | TEMPERATURE: 97.2 F

## 2021-04-02 VITALS — HEART RATE: 78 BPM

## 2021-04-02 DIAGNOSIS — Z12.11 SCREEN FOR COLON CANCER: Primary | ICD-10-CM

## 2021-04-02 LAB
COLONOSCOPY: NORMAL
HCG UR QL: NEGATIVE
UPPER GI ENDOSCOPY: NORMAL

## 2021-04-02 PROCEDURE — G8907 PT DOC NO EVENTS ON DISCHARG: HCPCS

## 2021-04-02 PROCEDURE — 88305 TISSUE EXAM BY PATHOLOGIST: CPT | Performed by: PATHOLOGY

## 2021-04-02 PROCEDURE — 81025 URINE PREGNANCY TEST: CPT | Performed by: ANESTHESIOLOGY

## 2021-04-02 PROCEDURE — 43239 EGD BIOPSY SINGLE/MULTIPLE: CPT

## 2021-04-02 PROCEDURE — 45378 DIAGNOSTIC COLONOSCOPY: CPT

## 2021-04-02 PROCEDURE — G8918 PT W/O PREOP ORDER IV AB PRO: HCPCS

## 2021-04-02 RX ORDER — ONDANSETRON 2 MG/ML
4 INJECTION INTRAMUSCULAR; INTRAVENOUS
Status: DISCONTINUED | OUTPATIENT
Start: 2021-04-02 | End: 2021-04-03 | Stop reason: HOSPADM

## 2021-04-02 RX ORDER — DEXAMETHASONE SODIUM PHOSPHATE 4 MG/ML
4 INJECTION, SOLUTION INTRA-ARTICULAR; INTRALESIONAL; INTRAMUSCULAR; INTRAVENOUS; SOFT TISSUE EVERY 10 MIN PRN
Status: DISCONTINUED | OUTPATIENT
Start: 2021-04-02 | End: 2021-04-03 | Stop reason: HOSPADM

## 2021-04-02 RX ORDER — LIDOCAINE 40 MG/G
CREAM TOPICAL
Status: DISCONTINUED | OUTPATIENT
Start: 2021-04-02 | End: 2021-04-03 | Stop reason: HOSPADM

## 2021-04-02 RX ORDER — SODIUM CHLORIDE, SODIUM LACTATE, POTASSIUM CHLORIDE, CALCIUM CHLORIDE 600; 310; 30; 20 MG/100ML; MG/100ML; MG/100ML; MG/100ML
INJECTION, SOLUTION INTRAVENOUS CONTINUOUS
Status: DISCONTINUED | OUTPATIENT
Start: 2021-04-02 | End: 2021-04-03 | Stop reason: HOSPADM

## 2021-04-02 RX ORDER — NALOXONE HYDROCHLORIDE 0.4 MG/ML
0.4 INJECTION, SOLUTION INTRAMUSCULAR; INTRAVENOUS; SUBCUTANEOUS
Status: DISCONTINUED | OUTPATIENT
Start: 2021-04-02 | End: 2021-04-03 | Stop reason: HOSPADM

## 2021-04-02 RX ORDER — NALOXONE HYDROCHLORIDE 0.4 MG/ML
0.2 INJECTION, SOLUTION INTRAMUSCULAR; INTRAVENOUS; SUBCUTANEOUS
Status: DISCONTINUED | OUTPATIENT
Start: 2021-04-02 | End: 2021-04-03 | Stop reason: HOSPADM

## 2021-04-02 RX ORDER — LABETALOL HYDROCHLORIDE 5 MG/ML
10 INJECTION, SOLUTION INTRAVENOUS
Status: DISCONTINUED | OUTPATIENT
Start: 2021-04-02 | End: 2021-04-03 | Stop reason: HOSPADM

## 2021-04-02 RX ORDER — ONDANSETRON 4 MG/1
4 TABLET, ORALLY DISINTEGRATING ORAL EVERY 6 HOURS PRN
Status: DISCONTINUED | OUTPATIENT
Start: 2021-04-02 | End: 2021-04-03 | Stop reason: HOSPADM

## 2021-04-02 RX ORDER — PROPOFOL 10 MG/ML
INJECTION, EMULSION INTRAVENOUS CONTINUOUS PRN
Status: DISCONTINUED | OUTPATIENT
Start: 2021-04-02 | End: 2021-04-02

## 2021-04-02 RX ORDER — ONDANSETRON 2 MG/ML
4 INJECTION INTRAMUSCULAR; INTRAVENOUS EVERY 6 HOURS PRN
Status: DISCONTINUED | OUTPATIENT
Start: 2021-04-02 | End: 2021-04-03 | Stop reason: HOSPADM

## 2021-04-02 RX ORDER — KETOROLAC TROMETHAMINE 30 MG/ML
30 INJECTION, SOLUTION INTRAMUSCULAR; INTRAVENOUS EVERY 6 HOURS PRN
Status: DISCONTINUED | OUTPATIENT
Start: 2021-04-02 | End: 2021-04-03 | Stop reason: HOSPADM

## 2021-04-02 RX ORDER — ONDANSETRON 2 MG/ML
4 INJECTION INTRAMUSCULAR; INTRAVENOUS EVERY 30 MIN PRN
Status: DISCONTINUED | OUTPATIENT
Start: 2021-04-02 | End: 2021-04-03 | Stop reason: HOSPADM

## 2021-04-02 RX ORDER — PROPOFOL 10 MG/ML
INJECTION, EMULSION INTRAVENOUS PRN
Status: DISCONTINUED | OUTPATIENT
Start: 2021-04-02 | End: 2021-04-02

## 2021-04-02 RX ORDER — FENTANYL CITRATE 50 UG/ML
25-50 INJECTION, SOLUTION INTRAMUSCULAR; INTRAVENOUS
Status: DISCONTINUED | OUTPATIENT
Start: 2021-04-02 | End: 2021-04-03 | Stop reason: HOSPADM

## 2021-04-02 RX ORDER — PROCHLORPERAZINE MALEATE 10 MG
10 TABLET ORAL EVERY 6 HOURS PRN
Status: DISCONTINUED | OUTPATIENT
Start: 2021-04-02 | End: 2021-04-03 | Stop reason: HOSPADM

## 2021-04-02 RX ORDER — MEPERIDINE HYDROCHLORIDE 25 MG/ML
12.5 INJECTION INTRAMUSCULAR; INTRAVENOUS; SUBCUTANEOUS
Status: DISCONTINUED | OUTPATIENT
Start: 2021-04-02 | End: 2021-04-03 | Stop reason: HOSPADM

## 2021-04-02 RX ORDER — ONDANSETRON 2 MG/ML
INJECTION INTRAMUSCULAR; INTRAVENOUS PRN
Status: DISCONTINUED | OUTPATIENT
Start: 2021-04-02 | End: 2021-04-02

## 2021-04-02 RX ORDER — LIDOCAINE HYDROCHLORIDE 20 MG/ML
INJECTION, SOLUTION INFILTRATION; PERINEURAL PRN
Status: DISCONTINUED | OUTPATIENT
Start: 2021-04-02 | End: 2021-04-02

## 2021-04-02 RX ORDER — ONDANSETRON 4 MG/1
4 TABLET, ORALLY DISINTEGRATING ORAL EVERY 30 MIN PRN
Status: DISCONTINUED | OUTPATIENT
Start: 2021-04-02 | End: 2021-04-03 | Stop reason: HOSPADM

## 2021-04-02 RX ORDER — FLUMAZENIL 0.1 MG/ML
0.2 INJECTION, SOLUTION INTRAVENOUS
Status: DISCONTINUED | OUTPATIENT
Start: 2021-04-02 | End: 2021-04-03 | Stop reason: HOSPADM

## 2021-04-02 RX ORDER — ALBUTEROL SULFATE 0.83 MG/ML
2.5 SOLUTION RESPIRATORY (INHALATION) EVERY 4 HOURS PRN
Status: DISCONTINUED | OUTPATIENT
Start: 2021-04-02 | End: 2021-04-03 | Stop reason: HOSPADM

## 2021-04-02 RX ORDER — OXYCODONE HYDROCHLORIDE 5 MG/1
5-10 TABLET ORAL EVERY 4 HOURS PRN
Status: DISCONTINUED | OUTPATIENT
Start: 2021-04-02 | End: 2021-04-03 | Stop reason: HOSPADM

## 2021-04-02 RX ADMIN — PROPOFOL 150 MCG/KG/MIN: 10 INJECTION, EMULSION INTRAVENOUS at 13:41

## 2021-04-02 RX ADMIN — PROPOFOL 80 MG: 10 INJECTION, EMULSION INTRAVENOUS at 13:41

## 2021-04-02 RX ADMIN — ONDANSETRON 4 MG: 2 INJECTION INTRAMUSCULAR; INTRAVENOUS at 13:46

## 2021-04-02 RX ADMIN — LIDOCAINE HYDROCHLORIDE 5 ML: 20 INJECTION, SOLUTION INFILTRATION; PERINEURAL at 13:41

## 2021-04-02 RX ADMIN — SODIUM CHLORIDE, SODIUM LACTATE, POTASSIUM CHLORIDE, CALCIUM CHLORIDE: 600; 310; 30; 20 INJECTION, SOLUTION INTRAVENOUS at 13:28

## 2021-04-02 RX ADMIN — PROPOFOL 30 MG: 10 INJECTION, EMULSION INTRAVENOUS at 13:43

## 2021-04-02 RX ADMIN — PROPOFOL 30 MG: 10 INJECTION, EMULSION INTRAVENOUS at 13:45

## 2021-04-02 ASSESSMENT — LIFESTYLE VARIABLES: TOBACCO_USE: 1

## 2021-04-02 NOTE — ANESTHESIA POSTPROCEDURE EVALUATION
Patient: Santa Kinsey    Procedure(s):  COLONOSCOPY, WITH CO2 INSUFFLATION  ESOPHAGOGASTRODUODENOSCOPY, WITH CO2 INSUFFLATION  Esophagogastroduodenoscopy, With Biopsy    Diagnosis:Pancreatic lesion [K86.9]  Weight loss [R63.4]  Gastroesophageal reflux disease, unspecified whether esophagitis present [K21.9]  BRBPR (bright red blood per rectum) [K62.5]  Diagnosis Additional Information: No value filed.    Anesthesia Type:  MAC    Note:  Disposition: Outpatient   Postop Pain Control: Uneventful            Sign Out: Well controlled pain   PONV:    Neuro/Psych: Uneventful            Sign Out: Acceptable/Baseline neuro status   Airway/Respiratory: Uneventful            Sign Out: Acceptable/Baseline resp. status   CV/Hemodynamics: Uneventful            Sign Out: Acceptable CV status   Other NRE:    DID A NON-ROUTINE EVENT OCCUR?          Last vitals:  Vitals:    04/02/21 1243 04/02/21 1431 04/02/21 1455   BP: 111/62 99/58 101/65   Pulse:  75 75   Resp: 16 16 16   Temp: 36.5  C (97.7  F) 36.2  C (97.2  F)    SpO2: 100% 98% 98%       Last vitals prior to Anesthesia Care Transfer:  CRNA VITALS  4/2/2021 1400 - 4/2/2021 1500      4/2/2021             EKG:  NSR          Electronically Signed By: Brittany Peters MD  April 2, 2021  3:01 PM

## 2021-04-02 NOTE — ANESTHESIA PREPROCEDURE EVALUATION
Anesthesia Pre-Procedure Evaluation    Patient: Santa Kinsey   MRN: [id number] : 1975        Preoperative Diagnosis: Pancreatic lesion [K86.9]  Weight loss [R63.4]  Gastroesophageal reflux disease, unspecified whether esophagitis present [K21.9]  BRBPR (bright red blood per rectum) [K62.5]   Procedure : Procedure(s):  COLONOSCOPY, WITH CO2 INSUFFLATION  ESOPHAGOGASTRODUODENOSCOPY, WITH CO2 INSUFFLATION     Past Medical History:   Diagnosis Date     ASCUS favoring benign 8/19/15    neg HPV     Depressive disorder, not elsewhere classified      Mild major depression (H) 2011     Other, mixed, or unspecified nondependent drug abuse, unspecified     Drug /ETOH abuse (non-depend.)      Past Surgical History:   Procedure Laterality Date     C APPENDECTOMY        Allergies   Allergen Reactions     No Known Drug Allergies       Social History     Tobacco Use     Smoking status: Current Every Day Smoker     Packs/day: 0.75     Years: 8.00     Pack years: 6.00     Types: Cigarettes     Smokeless tobacco: Never Used     Tobacco comment: smokes only when stressed out    Substance Use Topics     Alcohol use: No      Wt Readings from Last 1 Encounters:   21 47.8 kg (105 lb 6.4 oz)        Anesthesia Evaluation   Pt has had prior anesthetic. Type: General.    History of anesthetic complications  - motion sickness.      ROS/MED HX  ENT/Pulmonary:     (+) tobacco use, Current use, 6  Pack-Year Hx,      Neurologic:       Cardiovascular:     (+) Dyslipidemia -----    METS/Exercise Tolerance:     Hematologic:  - neg hematologic  ROS     Musculoskeletal:  - neg musculoskeletal ROS     GI/Hepatic: Comment: Pancrease cyst, unintentional significant weight loss    (+) GERD,     Renal/Genitourinary:  - neg Renal ROS     Endo:  - neg endo ROS     Psychiatric/Substance Use: Comment: Abstinent for 18 years      (+) psychiatric history anxiety and depression alcohol abuse Recreational drug usage: Meth.    Infectious Disease:   - neg infectious disease ROS     Malignancy:  - neg malignancy ROS     Other:            Physical Exam    Airway  airway exam normal      Mallampati: I   TM distance: > 3 FB   Neck ROM: full   Mouth opening: > 3 cm    Respiratory Devices and Support         Dental  no notable dental history         Cardiovascular   cardiovascular exam normal       Rhythm and rate: regular and normal     Pulmonary   pulmonary exam normal                OUTSIDE LABS:  CBC:   Lab Results   Component Value Date    WBC 7.9 03/08/2021    WBC 7.1 01/13/2021    HGB 12.9 03/08/2021    HGB 11.8 01/13/2021    HCT 38.2 03/08/2021    HCT 35.3 01/13/2021     03/08/2021     01/13/2021     BMP:   Lab Results   Component Value Date     03/08/2021     01/13/2021    POTASSIUM 3.8 03/08/2021    POTASSIUM 3.6 01/13/2021    CHLORIDE 105 03/08/2021    CHLORIDE 108 01/13/2021    CO2 32 03/08/2021    CO2 29 01/13/2021    BUN 11 03/08/2021    BUN 7 01/13/2021    CR 0.91 03/08/2021    CR 0.79 01/13/2021    GLC 82 03/08/2021    GLC 73 01/13/2021     COAGS: No results found for: PTT, INR, FIBR  POC:   Lab Results   Component Value Date    HCG Negative 04/02/2021     HEPATIC:   Lab Results   Component Value Date    ALBUMIN 3.9 03/08/2021    PROTTOTAL 7.3 03/08/2021    ALT 18 03/08/2021    AST 10 03/08/2021    ALKPHOS 64 03/08/2021    BILITOTAL 0.3 03/08/2021     OTHER:   Lab Results   Component Value Date    PH 6.0 04/20/2001    NANETTE 8.9 03/08/2021    TSH 1.20 01/13/2021    CRP <2.9 01/13/2021    SED 6 01/13/2021       Anesthesia Plan    ASA Status:  2   NPO Status:  NPO Appropriate    Anesthesia Type: MAC.     - Reason for MAC: straight local not clinically adequate   Induction: Propofol.   Maintenance: TIVA.        Consents    Anesthesia Plan(s) and associated risks, benefits, and realistic alternatives discussed. Questions answered and patient/representative(s) expressed understanding.     - Discussed with:  Patient      - Extended  Intubation/Ventilatory Support Discussed: No.      - Patient is DNR/DNI Status: No    Use of blood products discussed: No .     Postoperative Care    Pain management: IV analgesics, Oral pain medications.   PONV prophylaxis: Ondansetron (or other 5HT-3), Background Propofol Infusion     Comments:    MAC, sedation, GA as back up, standard ASA monitors  All pertinent results and records reviewed, risks, included but not limited to hypoventilation, hypoxemia, laryngo/bronchospasm, N/V, intraoperative awareness due to sedation only d/w patient, all questions, concerns addressed            Brittany Peters MD

## 2021-04-02 NOTE — ANESTHESIA CARE TRANSFER NOTE
Patient: Santa Kinsey    Procedure(s):  COLONOSCOPY, WITH CO2 INSUFFLATION  ESOPHAGOGASTRODUODENOSCOPY, WITH CO2 INSUFFLATION  Esophagogastroduodenoscopy, With Biopsy    Diagnosis: Pancreatic lesion [K86.9]  Weight loss [R63.4]  Gastroesophageal reflux disease, unspecified whether esophagitis present [K21.9]  BRBPR (bright red blood per rectum) [K62.5]  Diagnosis Additional Information: No value filed.    Anesthesia Type:   MAC     Note:      Level of Consciousness: awake  Oxygen Supplementation: room air    Independent Airway: airway patency satisfactory and stable  Dentition: dentition unchanged  Vital Signs Stable: post-procedure vital signs reviewed and stable  Report to RN Given: handoff report given  Patient transferred to: Phase II  Comments: Patient awake, alert, and oriented. SpO2 100%.  No apparent anesthesia complications.   Handoff Report: Identifed the Patient, Identified the Reponsible Provider, Reviewed the pertinent medical history, Discussed the surgical course, Reviewed Intra-OP anesthesia mangement and issues during anesthesia, Set expectations for post-procedure period and Allowed opportunity for questions and acknowledgement of understanding      Vitals: (Last set prior to Anesthesia Care Transfer)  CRNA VITALS  4/2/2021 1400 - 4/2/2021 1432      4/2/2021             EKG:  NSR        Electronically Signed By: KALA Rubalcava CRNA  April 2, 2021  2:32 PM

## 2021-04-07 LAB — COPATH REPORT: NORMAL

## 2021-04-08 DIAGNOSIS — R63.4 WEIGHT LOSS: Primary | ICD-10-CM

## 2021-04-09 ENCOUNTER — TELEPHONE (OUTPATIENT)
Dept: GASTROENTEROLOGY | Facility: CLINIC | Age: 46
End: 2021-04-09

## 2021-04-09 NOTE — TELEPHONE ENCOUNTER
Writer called patient to read results from pathology report and schedule a repeat EGD with Libby, per Dr Olmedo.  LVM for patient to call us back to discuss.  Jameson Obregon, CMA

## 2021-04-12 NOTE — TELEPHONE ENCOUNTER
Writer read results of pathology report to patient, per Dr Roberts.  Patient would like a call from Dr Roberts to discuss the reason why another EGD is needed.  Writer told patient it is to monitor progress on any inflammation after stopping any ibuprofen / NSAID's but told patient that Dr Roberts would call her sometime this week.  The patient did ask if this could be contributing to the weight loss the patient had been experiencing.  The patient can be reached from 1230 to 130 or after 330 PM  The patient agreed and will await Dr. Roberts's call.  Jameson Obregon CMA

## 2021-04-12 NOTE — TELEPHONE ENCOUNTER
Patient  called our clinic back to discuss appropriate time to call for scheduling Patient would like a phone call after 330. Noted by writer and attempt to make contact after 330 PM.  Jameson Obregon, SCOTTY

## 2021-04-29 ENCOUNTER — HOSPITAL ENCOUNTER (OUTPATIENT)
Facility: AMBULATORY SURGERY CENTER | Age: 46
End: 2021-04-29
Attending: INTERNAL MEDICINE
Payer: COMMERCIAL

## 2021-04-30 NOTE — TELEPHONE ENCOUNTER
Per Dr. Olmedo, she has contacted patient. Patient is scheduled for repeat EGD on 5/17/21.     Santa Bran LPN

## 2021-05-04 DIAGNOSIS — Z11.59 ENCOUNTER FOR SCREENING FOR OTHER VIRAL DISEASES: ICD-10-CM

## 2021-07-29 ENCOUNTER — MYC MEDICAL ADVICE (OUTPATIENT)
Dept: FAMILY MEDICINE | Facility: OTHER | Age: 46
End: 2021-07-29

## 2021-07-30 NOTE — TELEPHONE ENCOUNTER
Discussed vaccine with patient and she would like to proceed with [employer] here at [place]. Please schedule on nurse schedule.         KALA Dee CNP  Questions or concerns please feel free to send me a FanMob message or call me  Phone : [phone]

## 2021-07-30 NOTE — TELEPHONE ENCOUNTER
Awaiting response.       KALA Dee CNP  Questions or concerns please feel free to send me a [employer] message or call me  Phone : [phone]

## 2021-08-03 ENCOUNTER — IMMUNIZATION (OUTPATIENT)
Dept: PEDIATRICS | Facility: OTHER | Age: 46
End: 2021-08-03
Payer: COMMERCIAL

## 2021-08-03 PROCEDURE — 91300 PR COVID VAC PFIZER DIL RECON 30 MCG/0.3 ML IM: CPT

## 2021-08-03 PROCEDURE — 0001A PR COVID VAC PFIZER DIL RECON 30 MCG/0.3 ML IM: CPT

## 2021-08-27 ENCOUNTER — IMMUNIZATION (OUTPATIENT)
Dept: PEDIATRICS | Facility: OTHER | Age: 46
End: 2021-08-27
Attending: FAMILY MEDICINE
Payer: COMMERCIAL

## 2021-08-27 PROCEDURE — 91300 PR COVID VAC PFIZER DIL RECON 30 MCG/0.3 ML IM: CPT

## 2021-08-27 PROCEDURE — 0002A PR COVID VAC PFIZER DIL RECON 30 MCG/0.3 ML IM: CPT

## 2021-10-23 ENCOUNTER — HEALTH MAINTENANCE LETTER (OUTPATIENT)
Age: 46
End: 2021-10-23

## 2021-10-25 ENCOUNTER — NURSE TRIAGE (OUTPATIENT)
Dept: FAMILY MEDICINE | Facility: OTHER | Age: 46
End: 2021-10-25

## 2021-10-25 ENCOUNTER — MYC MEDICAL ADVICE (OUTPATIENT)
Dept: FAMILY MEDICINE | Facility: OTHER | Age: 46
End: 2021-10-25

## 2021-10-25 NOTE — TELEPHONE ENCOUNTER
Left message for patient to return call. Will respond to patient's MyChart regarding vaginal symptoms.

## 2021-10-27 NOTE — TELEPHONE ENCOUNTER
Patient read mychart and did not respond.    Encounter closed.  Sarah Shelby RN on 10/27/2021 at 10:27 AM

## 2021-11-11 ENCOUNTER — E-VISIT (OUTPATIENT)
Dept: FAMILY MEDICINE | Facility: OTHER | Age: 46
End: 2021-11-11

## 2021-11-11 ENCOUNTER — LAB (OUTPATIENT)
Dept: LAB | Facility: OTHER | Age: 46
End: 2021-11-11
Payer: COMMERCIAL

## 2021-11-11 DIAGNOSIS — R30.0 DYSURIA: ICD-10-CM

## 2021-11-11 DIAGNOSIS — R30.0 DYSURIA: Primary | ICD-10-CM

## 2021-11-11 LAB
ALBUMIN UR-MCNC: NEGATIVE MG/DL
APPEARANCE UR: CLEAR
BILIRUB UR QL STRIP: NEGATIVE
CLUE CELLS: ABNORMAL
COLOR UR AUTO: YELLOW
GLUCOSE UR STRIP-MCNC: NEGATIVE MG/DL
HGB UR QL STRIP: ABNORMAL
KETONES UR STRIP-MCNC: NEGATIVE MG/DL
LEUKOCYTE ESTERASE UR QL STRIP: NEGATIVE
NITRATE UR QL: NEGATIVE
PH UR STRIP: 6.5 [PH] (ref 5–7)
RBC #/AREA URNS AUTO: ABNORMAL /HPF
SP GR UR STRIP: 1.01 (ref 1–1.03)
SQUAMOUS #/AREA URNS AUTO: ABNORMAL /LPF
TRICHOMONAS, WET PREP: ABNORMAL
UROBILINOGEN UR STRIP-ACNC: 0.2 E.U./DL
WBC #/AREA URNS AUTO: ABNORMAL /HPF
WBC'S/HIGH POWER FIELD, WET PREP: ABNORMAL
YEAST, WET PREP: ABNORMAL

## 2021-11-11 PROCEDURE — 99421 OL DIG E/M SVC 5-10 MIN: CPT | Performed by: NURSE PRACTITIONER

## 2021-11-11 PROCEDURE — 87210 SMEAR WET MOUNT SALINE/INK: CPT

## 2021-11-11 PROCEDURE — 81001 URINALYSIS AUTO W/SCOPE: CPT

## 2021-11-12 NOTE — PATIENT INSTRUCTIONS
Dear Santa Kinsey    I recommend you monitor your symptoms and if any worsening symptoms let us know. Your Urine and vaginal swab are normal.     Thanks for choosing us as your health care partner,    KALA Dee CNP   independent

## 2021-11-23 ENCOUNTER — E-VISIT (OUTPATIENT)
Dept: URGENT CARE | Facility: CLINIC | Age: 46
End: 2021-11-23
Payer: COMMERCIAL

## 2021-11-23 ENCOUNTER — LAB (OUTPATIENT)
Dept: FAMILY MEDICINE | Facility: CLINIC | Age: 46
End: 2021-11-23
Attending: PHYSICIAN ASSISTANT
Payer: COMMERCIAL

## 2021-11-23 DIAGNOSIS — Z20.822 SUSPECTED COVID-19 VIRUS INFECTION: Primary | ICD-10-CM

## 2021-11-23 DIAGNOSIS — Z20.822 SUSPECTED COVID-19 VIRUS INFECTION: ICD-10-CM

## 2021-11-23 PROCEDURE — 99421 OL DIG E/M SVC 5-10 MIN: CPT | Performed by: PHYSICIAN ASSISTANT

## 2021-11-23 PROCEDURE — U0003 INFECTIOUS AGENT DETECTION BY NUCLEIC ACID (DNA OR RNA); SEVERE ACUTE RESPIRATORY SYNDROME CORONAVIRUS 2 (SARS-COV-2) (CORONAVIRUS DISEASE [COVID-19]), AMPLIFIED PROBE TECHNIQUE, MAKING USE OF HIGH THROUGHPUT TECHNOLOGIES AS DESCRIBED BY CMS-2020-01-R: HCPCS

## 2021-11-23 PROCEDURE — U0005 INFEC AGEN DETEC AMPLI PROBE: HCPCS

## 2021-11-23 NOTE — PATIENT INSTRUCTIONS
Dear Santa Kinsey,    Your symptoms show that you may have coronavirus (COVID-19). This illness can cause fever, cough and trouble breathing. Many people get a mild case and get better on their own. Some people can get very sick.    Will I be tested for COVID-19?  We would like to test you for Covid-19 virus. I have placed orders for this test.     To schedule: go to your [employer] home page and scroll down to the section that says  You have an appointment that needs to be scheduled  and click the large green button that says  Schedule Now  and follow the steps to find the next available openings.    If you are unable to complete these [employer] scheduling steps, please call [phone] to schedule your testing.     Return to work/school/ guidance:  Please let your workplace manager and staffing office know when your quarantine ends     We can t give you an exact date as it depends on the above. You can calculate this on your own or work with your manager/staffing office to calculate this. (For example if you were exposed on 10/4, you would have to quarantine for 14 full days. That would be through 10/18. You could return on 10/19.)      If you receive a positive COVID-19 test result, follow the guidance of the those who are giving you the results. Usually the return to work is 10 (or in some cases 20 days from symptom onset.) If you work at [facility], you must also be cleared by Employee Occupational Health and Safety to return to work.        If you receive a negative COVID-19 test result and did not have a high risk exposure to someone with a known positive COVID-19 test, you can return to work once you're free of fever for 24 hours without fever-reducing medication and your symptoms are improving or resolved.      If you receive a negative COVID-19 test and If you had a high risk exposure to someone who has tested positive for COVID-19 then you can return to work 14 days after your last contact  with the positive individual    Note: If you have ongoing exposure to the covid positive person, this quarantine period may be more than 14 days. (For example, if you are continued to be exposed to your child who tested positive and cannot isolate from them, then the quarantine of 7-14 days can't start until your child is no longer contagious. This is typically 10 days from onset of the child's symptoms. So the total duration may be 17-24 days in this case.)    Sign up for [email or web address].   We know it's scary to hear that you might have COVID-19. We want to track your symptoms to make sure you're okay over the next 2 weeks. Please look for an email from [email or web address]--this is a free, online program that we'll use to keep in touch. To sign up, follow the link in the email you will receive. Learn more at [email or web address]    How can I take care of myself?    Get lots of rest. Drink extra fluids (unless a doctor has told you not to)    Take Tylenol (acetaminophen) or ibuprofen for fever or pain. If you have liver or kidney problems, ask your family doctor if it's okay to take Tylenol o ibuprofen    If you have other health problems (like cancer, heart failure, an organ transplant or severe kidney disease): Call your specialty clinic if you don't feel better in the next 2 days.    Know when to call 911. Emergency warning signs include:  o Trouble breathing or shortness of breath  o Pain or pressure in the chest that doesn't go away  o Feeling confused like you haven't felt before, or not being able to wake up  o Bluish-colored lips or face    Where can I get more information?  [facility] - About COVID-19:   [email or web address]    CDC - What to Do If You're Sick:   www.cdc.gov/coronavirus/2019-ncov/about/steps-when-sick.html    November 23, 2021  RE:  Santa Kinsey                                                                                                                  [address]  [place]  [facility] [address]      To whom it may concern:    I evaluated Santa Kinsey on November 23, 2021. Santa Kinsey should be excused from work/school.     They should let their workplace manager and staffing office know when their quarantine ends.    We can not give an exact date as it depends on the information below. They can calculate this on their own or work with their manager/staffing office to calculate this. (For example if they were exposed on 10/04, they would have to quarantine for 14 full days. That would be through 10/18. They could return on 10/19.)    Quarantine Guidelines:      If patient receives a positive COVID-19 test result, they should follow the guidance of those who are giving the results. Usually the return to work is 10 (or in some cases 20 days from symptom onset.) If they work at [employer], they must be cleared by Employee Occupational Health and Safety to return to work.        If patient receives a negative COVID-19 test result and did not have a high risk exposure to someone with a known positive COVID-19 test, they can return to work once they're free of fever for 24 hours without fever-reducing medication and their symptoms are improving or resolved.      If patient receives a negative COVID-19 test and if they had a high risk exposure to someone who has tested positive for COVID-19 then they can return to work 14 days after their last contact with the positive individual    Note: If there is ongoing exposure to the covid positive person, this quarantine period may be longer than 14 days. (For example, if they are continually exposed to their child, who tested positive and cannot isolate from them, then the quarantine of 7-14 days can't start until their child is no longer contagious. This is typically 10 days from onset to the child's symptoms. So the total duration may be 17-24 days in this case.)     Sincerely,  Dominique Link PA-C, CRISTOFER

## 2021-11-24 ENCOUNTER — NURSE TRIAGE (OUTPATIENT)
Dept: NURSING | Facility: CLINIC | Age: 46
End: 2021-11-24
Payer: COMMERCIAL

## 2021-11-24 LAB — SARS-COV-2 RNA RESP QL NAA+PROBE: NEGATIVE

## 2021-11-24 NOTE — TELEPHONE ENCOUNTER
Coronavirus (COVID-19) Notification     Reason for call  Patient requesting results     Lab Result    Lab test 2019-nCoV rRt-PCR in process        RN Recommendations/Instructions per [facility]  Continue quarantee and following instructions until you receive the results     Please Contact your PCP clinic or return to the Emergency department if your:    Symptoms worsen or other concerning symptom's.     Patient informed that if test for COVID19 is POSITIVE,  you will receive a call typically within 48 hours from the test date (date lab collected).  If NEGATIVE result, you will receive a letter in the mail or Iconfinderhart.      Renée Gonzalez RN

## 2022-01-25 ENCOUNTER — E-VISIT (OUTPATIENT)
Dept: FAMILY MEDICINE | Facility: OTHER | Age: 47
End: 2022-01-25

## 2022-01-25 ENCOUNTER — LAB (OUTPATIENT)
Dept: LAB | Facility: OTHER | Age: 47
End: 2022-01-25
Payer: COMMERCIAL

## 2022-01-25 DIAGNOSIS — R30.0 DYSURIA: ICD-10-CM

## 2022-01-25 DIAGNOSIS — N89.8 VAGINAL DISCHARGE: ICD-10-CM

## 2022-01-25 DIAGNOSIS — N39.0 ACUTE UTI (URINARY TRACT INFECTION): ICD-10-CM

## 2022-01-25 DIAGNOSIS — N89.8 VAGINAL DISCHARGE: Primary | ICD-10-CM

## 2022-01-25 LAB
ALBUMIN UR-MCNC: NEGATIVE MG/DL
APPEARANCE UR: CLEAR
BILIRUB UR QL STRIP: NEGATIVE
CLUE CELLS: ABNORMAL
COLOR UR AUTO: YELLOW
GLUCOSE UR STRIP-MCNC: NEGATIVE MG/DL
HGB UR QL STRIP: NEGATIVE
KETONES UR STRIP-MCNC: NEGATIVE MG/DL
LEUKOCYTE ESTERASE UR QL STRIP: NEGATIVE
NITRATE UR QL: NEGATIVE
PH UR STRIP: 6 [PH] (ref 5–7)
SP GR UR STRIP: 1.01 (ref 1–1.03)
TRICHOMONAS, WET PREP: ABNORMAL
UROBILINOGEN UR STRIP-ACNC: 0.2 E.U./DL
WBC'S/HIGH POWER FIELD, WET PREP: ABNORMAL
YEAST, WET PREP: ABNORMAL

## 2022-01-25 PROCEDURE — 87210 SMEAR WET MOUNT SALINE/INK: CPT

## 2022-01-25 PROCEDURE — 81003 URINALYSIS AUTO W/O SCOPE: CPT

## 2022-01-25 PROCEDURE — 99421 OL DIG E/M SVC 5-10 MIN: CPT | Performed by: NURSE PRACTITIONER

## 2022-01-25 PROCEDURE — 87086 URINE CULTURE/COLONY COUNT: CPT | Performed by: NURSE PRACTITIONER

## 2022-01-26 LAB — BACTERIA UR CULT: NO GROWTH

## 2022-01-27 NOTE — TELEPHONE ENCOUNTER
Appears that Julia Christensen placed order for wet prep yesterday and is was completed by pt.  Dr. Julian Caldwell sent Rx to pharmacy for metroNIDAZOLE (FLAGYL) 500 MG tablet  
Left message to call clinic, please schedule for a lab only appointment for today. See message.   Lilia Card MA on 11/25/2019 at 12:24 PM      
Please call patient and schedule lab only appointment for today.    Julian Caldwell MD, FAAFP  Family Medicine Physician  [facility] [place]  [address] [facility] [place] [address]      
Quality 402: Tobacco Use And Help With Quitting Among Adolescents: Patient screened for tobacco and never smoked
Quality 226: Preventive Care And Screening: Tobacco Use: Screening And Cessation Intervention: Patient screened for tobacco use and is an ex/non-smoker
Detail Level: Detailed

## 2022-06-04 ENCOUNTER — HEALTH MAINTENANCE LETTER (OUTPATIENT)
Age: 47
End: 2022-06-04

## 2022-06-14 NOTE — TELEPHONE ENCOUNTER
Changed to telephone visit.     KALA Dee CNP     Past Medical History:   Diagnosis Date    Lymphedema     MS (multiple sclerosis)     Vitamin B12 deficiency        Past Surgical History:   Procedure Laterality Date    BREAST CYST EXCISION Left     over 40yrs ago     SECTION      ESOPHAGOGASTRODUODENOSCOPY N/A 2022    Procedure: EGD (ESOPHAGOGASTRODUODENOSCOPY);  Surgeon: Radha Arango MD;  Location: [address]);  Service: Endoscopy;  Laterality: N/A;       Review of patient's allergies indicates:   Allergen Reactions    Contrast media Shortness Of Breath and Rash    Pcn [penicillins] Shortness Of Breath and Rash    Celebrex [celecoxib] Other (See Comments)     Swallowing problems     Diazepam Hives    Motrin [ibuprofen] Rash       Family History       Problem Relation (Age of Onset)    Brain cancer Brother    Coronary artery disease Father    Lung cancer Father, Mother          Tobacco Use    Smoking status: Never Smoker    Smokeless tobacco: Never Used   Substance and Sexual Activity    Alcohol use: No    Drug use: No    Sexual activity: Not on file      Review of Systems   Constitutional:  Negative for chills, fatigue and fever.   HENT:  Negative for congestion and sinus pain.    Eyes:  Negative for pain and discharge.   Respiratory:  Negative for cough, shortness of breath and wheezing.    Cardiovascular:  Negative for chest pain and leg swelling.   Gastrointestinal:  Negative for abdominal pain, constipation, diarrhea, nausea and vomiting.   Genitourinary:  Negative for difficulty urinating and enuresis.   Musculoskeletal:  Positive for gait problem.   Skin:  Positive for color change and wound. Negative for rash.        Due to lymphedema of BL LE   Neurological:  Positive for weakness. Negative for dizziness and light-headedness.        LLE weakness due to MS   Hematological:  Does not bruise/bleed easily.   Psychiatric/Behavioral:  Negative for confusion. The patient is nervous/anxious.    Objective:     Vital Signs (Most  Recent):  Temp: 98.3 °F (36.8 °C) (06/14/22 1045)  Pulse: 101 (06/14/22 1502)  Resp: 18 (06/14/22 1502)  BP: 100/63 (06/14/22 1502)  SpO2: (!) 91 % (06/14/22 1502)   Vital Signs (24h Range):  Temp:  [98.1 °F (36.7 °C)-98.6 °F (37 °C)] 98.3 °F (36.8 °C)  Pulse:  [] 101  Resp:  [18-20] 18  SpO2:  [91 %-97 %] 91 %  BP: ()/(63-92) 100/63   Weight: 92.1 kg (203 lb)  Body mass index is 37.13 kg/m².      Intake/Output Summary (Last 24 hours) at 6/14/2022 1750  Last data filed at 6/13/2022 1800  Gross per 24 hour   Intake 350 ml   Output --   Net 350 ml       Physical Exam  Vitals and nursing note reviewed.   Constitutional:       General: She is not in acute distress.     Appearance: Normal appearance.   HENT:      Head: Normocephalic and atraumatic.   Eyes:      Extraocular Movements: Extraocular movements intact.      Conjunctiva/sclera: Conjunctivae normal.   Cardiovascular:      Rate and Rhythm: Regular rhythm. Tachycardia present.      Pulses: Normal pulses.      Heart sounds: Normal heart sounds. No murmur heard.     Comments: Systolic murmur  Pulmonary:      Effort: Pulmonary effort is normal. No respiratory distress.      Breath sounds: Normal breath sounds. No wheezing.   Abdominal:      General: Abdomen is flat. Bowel sounds are normal. There is no distension.      Palpations: Abdomen is soft.      Tenderness: There is no abdominal tenderness.   Musculoskeletal:         General: No swelling or tenderness.      Right lower leg: Edema present.      Left lower leg: Edema present.   Skin:     General: Skin is warm and dry.      Coloration: Skin is not jaundiced.      Findings: Erythema and lesion present.      Comments: BL LE erythema with overlying hyperkeratotic changes   Neurological:      Mental Status: She is alert and oriented to person, place, and time.      Motor: Weakness present.      Comments: LLE weakness due to MS   Psychiatric:         Mood and Affect: Mood normal.         Behavior: Behavior  normal.       Vents:     Lines/Drains/Airways       Peripheral Intravenous Line  Duration                  Peripheral IV - Single Lumen 06/14/22 1004 18 G Left Antecubital <1 day         Peripheral IV - Single Lumen 06/14/22 1215 20 G Right Antecubital <1 day                  Significant Labs:    CBC/Anemia Profile:  Recent Labs   Lab 06/14/22 0522 06/14/22  1016   WBC 7.71 7.85   HGB 11.2* 11.0*   HCT 35.2* 34.6*    218   MCV 97 98   RDW 16.1* 15.9*        Chemistries:  Recent Labs   Lab 06/14/22 0522 06/14/22  1016    141   K 3.9 3.6    103   CO2 23 22*   BUN 22 24*   CREATININE 0.9 0.9   CALCIUM 10.9* 10.9*   ALBUMIN  --  3.9   PROT  --  7.7   BILITOT  --  0.5   ALKPHOS  --  65   ALT  --  18   AST  --  17   MG 2.0  --    PHOS 4.2  --        All pertinent labs within the past 24 hours have been reviewed.    Significant Imaging: I have reviewed all pertinent imaging results/findings within the past 24 hours.

## 2022-08-26 ENCOUNTER — TELEPHONE (OUTPATIENT)
Dept: FAMILY MEDICINE | Facility: OTHER | Age: 47
End: 2022-08-26

## 2022-08-26 NOTE — TELEPHONE ENCOUNTER
RN's please call patient, she made an  E-visit appointment for anxiety and depression, but is not currently on any medication so I would recommend a virtual at the very least to discuss.     Please also triage for any concerning immediate needs of mental health    PHQ 6/3/2019 11/6/2019 9/18/2020   PHQ-9 Total Score 0 4 2   Q9: Thoughts of better off dead/self-harm past 2 weeks Not at all Not at all Not at all     TRACEY-7 SCORE 11/6/2019 9/18/2020 8/26/2022   Total Score - - -   Total Score 5 (mild anxiety) 3 (minimal anxiety) 7 (mild anxiety)   Total Score 5 3 7             KALA Dee CNP  Questions or concerns please feel free to send me a mobicanvas message or call me  Phone : [phone]

## 2022-08-26 NOTE — TELEPHONE ENCOUNTER
Spoke with Santa.  Canceled E-visit and set up a virtual visit for 8/29 with Julia ARMENDARIZ CNP.    Patient states she is not having any suicidal ideations and feels stable.  Just feels a little anxious but no self harm thoughts and feels safe in her environment.    Nini Hartley RN  [facility] ~ Registered Nurse  Clinic Triage ~ [place] [facility] [place]  August 26, 2022

## 2022-08-29 ENCOUNTER — VIRTUAL VISIT (OUTPATIENT)
Dept: FAMILY MEDICINE | Facility: OTHER | Age: 47
End: 2022-08-29
Payer: COMMERCIAL

## 2022-08-29 DIAGNOSIS — F41.1 GENERALIZED ANXIETY DISORDER: Primary | ICD-10-CM

## 2022-08-29 DIAGNOSIS — Z12.31 VISIT FOR SCREENING MAMMOGRAM: ICD-10-CM

## 2022-08-29 PROCEDURE — 99214 OFFICE O/P EST MOD 30 MIN: CPT | Mod: TEL | Performed by: NURSE PRACTITIONER

## 2022-08-29 RX ORDER — BUSPIRONE HYDROCHLORIDE 10 MG/1
TABLET ORAL
Qty: 60 TABLET | Refills: 0 | Status: SHIPPED | OUTPATIENT
Start: 2022-08-29 | End: 2022-10-28

## 2022-08-29 ASSESSMENT — ANXIETY QUESTIONNAIRES
2. NOT BEING ABLE TO STOP OR CONTROL WORRYING: SEVERAL DAYS
7. FEELING AFRAID AS IF SOMETHING AWFUL MIGHT HAPPEN: SEVERAL DAYS
3. WORRYING TOO MUCH ABOUT DIFFERENT THINGS: SEVERAL DAYS
GAD7 TOTAL SCORE: 7
1. FEELING NERVOUS, ANXIOUS, OR ON EDGE: SEVERAL DAYS
8. IF YOU CHECKED OFF ANY PROBLEMS, HOW DIFFICULT HAVE THESE MADE IT FOR YOU TO DO YOUR WORK, TAKE CARE OF THINGS AT HOME, OR GET ALONG WITH OTHER PEOPLE?: VERY DIFFICULT
GAD7 TOTAL SCORE: 7
5. BEING SO RESTLESS THAT IT IS HARD TO SIT STILL: NOT AT ALL
7. FEELING AFRAID AS IF SOMETHING AWFUL MIGHT HAPPEN: SEVERAL DAYS
GAD7 TOTAL SCORE: 7
IF YOU CHECKED OFF ANY PROBLEMS ON THIS QUESTIONNAIRE, HOW DIFFICULT HAVE THESE PROBLEMS MADE IT FOR YOU TO DO YOUR WORK, TAKE CARE OF THINGS AT HOME, OR GET ALONG WITH OTHER PEOPLE: VERY DIFFICULT
4. TROUBLE RELAXING: NOT AT ALL
6. BECOMING EASILY ANNOYED OR IRRITABLE: NEARLY EVERY DAY

## 2022-08-29 NOTE — PROGRESS NOTES
"Santa is a 47 year old who is being evaluated via a billable telephone visit.      What phone number would you like to be contacted at? Cell Phone   How would you like to obtain your AVS? Terehart    Assessment & Plan     Generalized anxiety disorder  Worsening anxiety  Previously was on Buspar and that seemed to work well for her from what she recalls. Was on Zoloft over 10 years ago and this made her feel \"horrible\"   I recommend since she is struggling with her anxiety that we trial the Buspar again. Discussed possible side effects and monitoring.   Recommend personal counseling as well.   Advised follow up with me at her physical which we scheduled today while on the phone.   - busPIRone (BUSPAR) 10 MG tablet; Start with 1 tablet daily with breakfast for 3 days then if tolerating increase to 1 tablet twice daily with breakfast and dinner.    Visit for screening mammogram    - MA Screening Digital Bilateral; Future          There are no Patient Instructions on file for this visit.    No follow-ups on file.    KALA Dee CNP  [facility]    Julian Pratt is a 47 year old}, presenting for the following health issues:  No chief complaint on file.      HPI     Patient states that her boyfriend says that she is anxious and that she has anxiety. She thought she is handling it. It is impacting her relationship with her boyfriends. Right now she is doing pre-marriage counseling. She was doing counseling for herself at one point not now.     Review of Systems         Objective           Vitals:  No vitals were obtained today due to virtual visit.    Physical Exam   healthy, alert and no distress  PSYCH: Alert and oriented times 3; coherent speech, normal   rate and volume, able to articulate logical thoughts, able   to abstract reason, no tangential thoughts, no hallucinations   or delusions  Her affect is normal  RESP: No cough, no audible wheezing, able to talk in full " sentences  Remainder of exam unable to be completed due to telephone visits    No results found for any visits on 08/29/22.        Phone call duration: 11 minutes    .  ..

## 2022-10-09 ENCOUNTER — HEALTH MAINTENANCE LETTER (OUTPATIENT)
Age: 47
End: 2022-10-09

## 2022-10-25 ASSESSMENT — ENCOUNTER SYMPTOMS
CHILLS: 0
DYSURIA: 0
SHORTNESS OF BREATH: 0
ABDOMINAL PAIN: 0
SORE THROAT: 0
JOINT SWELLING: 0
COUGH: 0
NAUSEA: 0
MYALGIAS: 0
WEAKNESS: 0
DIARRHEA: 0
HEMATOCHEZIA: 0
HEADACHES: 0
BREAST MASS: 0
PARESTHESIAS: 0
DIZZINESS: 0
FREQUENCY: 0
HEARTBURN: 0
FEVER: 0
NERVOUS/ANXIOUS: 0
PALPITATIONS: 0
CONSTIPATION: 0
EYE PAIN: 0
HEMATURIA: 0
ARTHRALGIAS: 0

## 2022-10-28 ENCOUNTER — OFFICE VISIT (OUTPATIENT)
Dept: FAMILY MEDICINE | Facility: OTHER | Age: 47
End: 2022-10-28
Payer: COMMERCIAL

## 2022-10-28 VITALS
WEIGHT: 102.5 LBS | DIASTOLIC BLOOD PRESSURE: 60 MMHG | TEMPERATURE: 97.8 F | BODY MASS INDEX: 18.16 KG/M2 | SYSTOLIC BLOOD PRESSURE: 102 MMHG | HEIGHT: 63 IN | HEART RATE: 90 BPM | RESPIRATION RATE: 18 BRPM | OXYGEN SATURATION: 100 %

## 2022-10-28 DIAGNOSIS — Z11.3 SCREEN FOR STD (SEXUALLY TRANSMITTED DISEASE): ICD-10-CM

## 2022-10-28 DIAGNOSIS — B00.9 HSV (HERPES SIMPLEX VIRUS) INFECTION: ICD-10-CM

## 2022-10-28 DIAGNOSIS — Z00.00 ROUTINE GENERAL MEDICAL EXAMINATION AT A HEALTH CARE FACILITY: Primary | ICD-10-CM

## 2022-10-28 DIAGNOSIS — F41.1 GENERALIZED ANXIETY DISORDER: ICD-10-CM

## 2022-10-28 LAB
ALBUMIN SERPL-MCNC: 4.1 G/DL (ref 3.4–5)
ALP SERPL-CCNC: 65 U/L (ref 40–150)
ALT SERPL W P-5'-P-CCNC: 18 U/L (ref 0–50)
ANION GAP SERPL CALCULATED.3IONS-SCNC: 2 MMOL/L (ref 3–14)
AST SERPL W P-5'-P-CCNC: 13 U/L (ref 0–45)
BILIRUB SERPL-MCNC: 0.4 MG/DL (ref 0.2–1.3)
BUN SERPL-MCNC: 14 MG/DL (ref 7–30)
CALCIUM SERPL-MCNC: 8.9 MG/DL (ref 8.5–10.1)
CHLORIDE BLD-SCNC: 106 MMOL/L (ref 94–109)
CHOLEST SERPL-MCNC: 192 MG/DL
CLUE CELLS: ABNORMAL
CO2 SERPL-SCNC: 29 MMOL/L (ref 20–32)
CREAT SERPL-MCNC: 0.82 MG/DL (ref 0.52–1.04)
FASTING STATUS PATIENT QL REPORTED: NO
GFR SERPL CREATININE-BSD FRML MDRD: 89 ML/MIN/1.73M2
GLUCOSE BLD-MCNC: 72 MG/DL (ref 70–99)
HDLC SERPL-MCNC: 52 MG/DL
LDLC SERPL CALC-MCNC: 126 MG/DL
NONHDLC SERPL-MCNC: 140 MG/DL
POTASSIUM BLD-SCNC: 3.9 MMOL/L (ref 3.4–5.3)
PROT SERPL-MCNC: 7.1 G/DL (ref 6.8–8.8)
SODIUM SERPL-SCNC: 137 MMOL/L (ref 133–144)
TRICHOMONAS, WET PREP: ABNORMAL
TRIGL SERPL-MCNC: 72 MG/DL
WBC'S/HIGH POWER FIELD, WET PREP: ABNORMAL
YEAST, WET PREP: ABNORMAL

## 2022-10-28 PROCEDURE — 87186 SC STD MICRODIL/AGAR DIL: CPT | Performed by: NURSE PRACTITIONER

## 2022-10-28 PROCEDURE — 87210 SMEAR WET MOUNT SALINE/INK: CPT | Performed by: NURSE PRACTITIONER

## 2022-10-28 PROCEDURE — 87591 N.GONORRHOEAE DNA AMP PROB: CPT | Performed by: NURSE PRACTITIONER

## 2022-10-28 PROCEDURE — 87491 CHLMYD TRACH DNA AMP PROBE: CPT | Performed by: NURSE PRACTITIONER

## 2022-10-28 PROCEDURE — 99396 PREV VISIT EST AGE 40-64: CPT | Performed by: NURSE PRACTITIONER

## 2022-10-28 PROCEDURE — 99213 OFFICE O/P EST LOW 20 MIN: CPT | Mod: 25 | Performed by: NURSE PRACTITIONER

## 2022-10-28 PROCEDURE — 87205 SMEAR GRAM STAIN: CPT | Performed by: NURSE PRACTITIONER

## 2022-10-28 PROCEDURE — 87070 CULTURE OTHR SPECIMN AEROBIC: CPT | Performed by: NURSE PRACTITIONER

## 2022-10-28 PROCEDURE — 36415 COLL VENOUS BLD VENIPUNCTURE: CPT | Performed by: NURSE PRACTITIONER

## 2022-10-28 PROCEDURE — 87077 CULTURE AEROBIC IDENTIFY: CPT | Performed by: NURSE PRACTITIONER

## 2022-10-28 PROCEDURE — 80061 LIPID PANEL: CPT | Performed by: NURSE PRACTITIONER

## 2022-10-28 PROCEDURE — 80053 COMPREHEN METABOLIC PANEL: CPT | Performed by: NURSE PRACTITIONER

## 2022-10-28 RX ORDER — VALACYCLOVIR HYDROCHLORIDE 1 G/1
1000 TABLET, FILM COATED ORAL 2 TIMES DAILY
Qty: 20 TABLET | Refills: 0 | Status: SHIPPED | OUTPATIENT
Start: 2022-10-28 | End: 2022-11-07

## 2022-10-28 RX ORDER — BUSPIRONE HYDROCHLORIDE 10 MG/1
10 TABLET ORAL 2 TIMES DAILY
Qty: 60 TABLET | Refills: 5 | Status: SHIPPED | OUTPATIENT
Start: 2022-10-28 | End: 2022-10-28

## 2022-10-28 RX ORDER — BUSPIRONE HYDROCHLORIDE 10 MG/1
10 TABLET ORAL 2 TIMES DAILY
Qty: 60 TABLET | Refills: 5 | Status: SHIPPED | OUTPATIENT
Start: 2022-10-28

## 2022-10-28 ASSESSMENT — ENCOUNTER SYMPTOMS
CHILLS: 0
WEAKNESS: 0
ARTHRALGIAS: 0
FEVER: 0
DIZZINESS: 0
NERVOUS/ANXIOUS: 0
DYSURIA: 0
COUGH: 0
PARESTHESIAS: 0
SORE THROAT: 0
FREQUENCY: 0
JOINT SWELLING: 0
ABDOMINAL PAIN: 0
NAUSEA: 0
HEMATURIA: 0
DIARRHEA: 0
PALPITATIONS: 0
CONSTIPATION: 0
HEMATOCHEZIA: 0
MYALGIAS: 0
EYE PAIN: 0
HEARTBURN: 0
BREAST MASS: 0
SHORTNESS OF BREATH: 0
HEADACHES: 0

## 2022-10-28 ASSESSMENT — PAIN SCALES - GENERAL: PAINLEVEL: NO PAIN (0)

## 2022-10-28 NOTE — PROGRESS NOTES
SUBJECTIVE:   CC: Santa is an 47 year old who presents for preventive health visit.       Patient has been advised of split billing requirements and indicates understanding: Yes  Healthy Habits:     Getting at least 3 servings of Calcium per day:  Yes    Bi-annual eye exam:  NO    Dental care twice a year:  Yes    Sleep apnea or symptoms of sleep apnea:  None    Diet:  Other    Frequency of exercise:  1 day/week    Duration of exercise:  Less than 15 minutes    Taking medications regularly:  Yes    Medication side effects:  None    PHQ-2 Total Score: 1    Additional concerns today:  No      Today's PHQ-2 Score:   PHQ-2 ( 1999 [employer]) 10/25/2022   Q1: Little interest or pleasure in doing things 1   Q2: Feeling down, depressed or hopeless 0   PHQ-2 Score 1   PHQ-2 Total Score (12-17 Years)- Positive if 3 or more points; Administer PHQ-A if positive -   Q1: Little interest or pleasure in doing things Several days   Q2: Feeling down, depressed or hopeless Not at all   PHQ-2 Score 1       Abuse: Current or Past (Physical, Sexual or Emotional) - No  Do you feel safe in your environment? Yes    Have you ever done Advance Care Planning? (For example, a Health Directive, POLST, or a discussion with a medical provider or your loved ones about your wishes): No, advance care planning information given to patient to review.  Patient declined advance care planning discussion at this time.    Social History     Tobacco Use     Smoking status: Every Day     Packs/day: 0.75     Years: 8.00     Pack years: 6.00     Types: Cigarettes     Smokeless tobacco: Never     Tobacco comments:     smokes only when stressed out    Substance Use Topics     Alcohol use: No     If you drink alcohol do you typically have >3 drinks per day or >7 drinks per week? No    Alcohol Use 10/25/2022   Prescreen: >3 drinks/day or >7 drinks/week? Not Applicable   Prescreen: >3 drinks/day or >7 drinks/week? -       Reviewed orders with patient.  Reviewed  health maintenance and updated orders accordingly - Yes  Lab work is in process    Breast Cancer Screening:    FHS-7:   Breast CA Risk Assessment (FHS-7) 10/25/2022   Did any of your first-degree relatives have breast or ovarian cancer? Yes   Did any of your relatives have bilateral breast cancer? Unknown   Did any man in your family have breast cancer? Unknown   Did any woman in your family have breast and ovarian cancer? Yes   Did any woman in your family have breast cancer before age 50 y? Unknown   Do you have 2 or more relatives with breast and/or ovarian cancer? No   Do you have 2 or more relatives with breast and/or bowel cancer? No     click delete button to remove this line now  Mammogram Screening: Recommended annual mammography  Pertinent mammograms are reviewed under the imaging tab.    History of abnormal Pap smear:   Last 3 Pap Results:   PAP (no units)   Date Value   07/26/2018 NIL   08/19/2015 ASC-US (A)   01/24/2011 NIL     PAP / HPV Latest Ref Rng & Units 7/26/2018 8/19/2015 1/24/2011   PAP (Historical) - NIL ASC-US(A) NIL   HPV16 NEG:Negative Negative Negative -   HPV18 NEG:Negative Negative Negative -   HRHPV NEG:Negative Negative Negative -     Reviewed and updated as needed this visit by clinical staff   Tobacco  Allergies  Meds   Med Hx  Surg Hx  Fam Hx  Soc Hx        Reviewed and updated as needed this visit by Provider                  Review of Systems   Constitutional: Negative for chills and fever.   HENT: Negative for congestion, ear pain, hearing loss and sore throat.    Eyes: Negative for pain and visual disturbance.   Respiratory: Negative for cough and shortness of breath.    Cardiovascular: Negative for chest pain, palpitations and peripheral edema.   Gastrointestinal: Negative for abdominal pain, constipation, diarrhea, heartburn, hematochezia and nausea.   Breasts:  Positive for tenderness. Negative for breast mass and discharge.   Genitourinary: Positive for vaginal  "discharge. Negative for dysuria, frequency, genital sores, hematuria, pelvic pain, urgency and vaginal bleeding.   Musculoskeletal: Negative for arthralgias, joint swelling and myalgias.   Skin: Positive for rash.   Neurological: Negative for dizziness, weakness, headaches and paresthesias.   Psychiatric/Behavioral: Negative for mood changes. The patient is not nervous/anxious.           OBJECTIVE:   /60 (BP Location: Left arm, Patient Position: Sitting, Cuff Size: Adult Regular)   Pulse 90   Temp 97.8  F (36.6  C) (Temporal)   Resp 18   Ht 1.605 m (5' 3.19\")   Wt 46.5 kg (102 lb 8 oz)   LMP 10/09/2022   SpO2 100%   BMI 18.05 kg/m    Physical Exam  GENERAL: healthy, alert and no distress  EYES: Eyes grossly normal to inspection, PERRL and conjunctivae and sclerae normal  HENT: ear canals and TM's normal, nose and mouth without ulcers or lesions  NECK: no adenopathy, no asymmetry, masses, or scars and thyroid normal to palpation  RESP: lungs clear to auscultation - no rales, rhonchi or wheezes  CV: regular rate and rhythm, normal S1 S2, no S3 or S4, no murmur, click or rub, no peripheral edema and peripheral pulses strong  ABDOMEN: soft, nontender, no hepatosplenomegaly, no masses and bowel sounds normal   (female): blister forming lesion noted just outside of the right labial area,   MS: no gross musculoskeletal defects noted, no edema  SKIN: no suspicious lesions or rashes  NEURO: Normal strength and tone, mentation intact and speech normal  PSYCH: mentation appears normal, affect normal/bright    Diagnostic Test Results:  Labs reviewed in Epic  Results for orders placed or performed in visit on 10/28/22   Wet prep - lab collect     Status: Abnormal    Specimen: Vagina; Swab   Result Value Ref Range    Trichomonas Absent Absent    Yeast Absent Absent    Clue Cells Absent Absent    WBCs/high power field 1+ (A) None       ASSESSMENT/PLAN:   (Z00.00) Routine general medical examination at [facility] " "facility  (primary encounter diagnosis)  Comment:   Plan: Comprehensive metabolic panel (BMP + Alb, Alk         Phos, ALT, AST, Total. Bili, TP), Lipid panel         reflex to direct LDL Non-fasting        Updated HM   Declined vaccines     (F41.1) Generalized anxiety disorder  Comment:   Plan: busPIRone (BUSPAR) 10 MG tablet, DISCONTINUED:         busPIRone (BUSPAR) 10 MG tablet        Stable, currently taking once daily will try doing twice daily.   Level 4     (Z11.3) Screen for STD (sexually transmitted disease)  Comment:  Plan: Chlamydia trachomatis PCR, Neisseria         gonorrhoeae PCR, Wet prep - lab collect            (B00.9) HSV (herpes simplex virus) infection  Comment: Her  is on medication for herpes and given the presentation of the lesion I will treat her with 10 day course. She does have a history of cold sores as well. Culture pending.   Plan: valACYclovir (VALTREX) 1000 mg tablet, Skin         Aerobic Bacterial Culture Routine with Gram         Stain        Level 4     The patient indicates understanding of these issues and agrees with the plan.      Patient has been advised of split billing requirements and indicates understanding: Yes      COUNSELING:  Reviewed preventive health counseling, as reflected in patient instructions    Estimated body mass index is 18.05 kg/m  as calculated from the following:    Height as of this encounter: 1.605 m (5' 3.19\").    Weight as of this encounter: 46.5 kg (102 lb 8 oz).        She reports that she has been smoking cigarettes. She has a 6.00 pack-year smoking history. She has never used smokeless tobacco.        Counseling Resources:  ATP IV Guidelines  Pooled Cohorts Equation Calculator  Breast Cancer Risk Calculator  BRCA-Related Cancer Risk Assessment: FHS-7 Tool  FRAX Risk Assessment  ICSI Preventive Guidelines  Dietary Guidelines for Americans, 2010  USDA's MyPlate  ASA Prophylaxis  Lung CA Screening    Julia Christensen, KAL[facility] " [facility]

## 2022-10-29 LAB
C TRACH DNA SPEC QL NAA+PROBE: NEGATIVE
N GONORRHOEA DNA SPEC QL NAA+PROBE: NEGATIVE

## 2022-10-31 ENCOUNTER — TELEPHONE (OUTPATIENT)
Dept: FAMILY MEDICINE | Facility: OTHER | Age: 47
End: 2022-10-31

## 2022-10-31 DIAGNOSIS — A49.01 STAPH AUREUS INFECTION: Primary | ICD-10-CM

## 2022-10-31 RX ORDER — SULFAMETHOXAZOLE/TRIMETHOPRIM 800-160 MG
1 TABLET ORAL 2 TIMES DAILY
Qty: 14 TABLET | Refills: 0 | Status: SHIPPED | OUTPATIENT
Start: 2022-10-31 | End: 2022-11-07

## 2022-10-31 NOTE — TELEPHONE ENCOUNTER
Please run general aerobic lesion swab. Can we keep the bacterial as well? It looks like it showed staph as well so will need to cover her for that.   It was just outside the labial majora so not technically within the genitale area.       Please also call patient let her know I added antibiotics for her wound and awaiting viral culture, continue both valtrex and antibiotics. Please explain staph infection and importance of taking antibiotic.     KALA Dee CNP  Questions or concerns please feel free to send me a [employer] message or call me  Phone : [phone]

## 2022-10-31 NOTE — TELEPHONE ENCOUNTER
Called and spoke with lab to clarify aerobic bacteria culture swab.     Spoke with patient and discussed results of staph. Medication has been sent to pharmacy and PCP recommends patient take both antibiotic and antiviral medication.     Patient states the valtrex was not ready the last time she was there so she will pick that up today and start taking both medications today.     Heike CAZARESN, RN

## 2022-10-31 NOTE — TELEPHONE ENCOUNTER
[facility] lab calling to verify aerobic culture swab from 10/28/22.    They state that if the aerobic bacterial culture swab is to rule out HSV, then need to cancel and reorder test because aerobic culture will not detect HSV.    If wanted a general aerobic lesion swab then they can run this.     Also wanting to clarify the source - labial?    Please call [facility] lab back at [phone] with providers response.     Heike CAZARESN, RN

## 2022-11-01 ENCOUNTER — ANCILLARY PROCEDURE (OUTPATIENT)
Dept: MAMMOGRAPHY | Facility: OTHER | Age: 47
End: 2022-11-01
Attending: NURSE PRACTITIONER
Payer: COMMERCIAL

## 2022-11-01 DIAGNOSIS — Z12.31 VISIT FOR SCREENING MAMMOGRAM: ICD-10-CM

## 2022-11-01 LAB
BACTERIA SKIN AEROBE CULT: ABNORMAL
GRAM STAIN RESULT: ABNORMAL
GRAM STAIN RESULT: ABNORMAL

## 2022-11-01 PROCEDURE — 77063 BREAST TOMOSYNTHESIS BI: CPT | Mod: TC | Performed by: RADIOLOGY

## 2022-11-01 PROCEDURE — 77067 SCR MAMMO BI INCL CAD: CPT | Mod: TC | Performed by: RADIOLOGY

## 2022-12-07 ENCOUNTER — MYC MEDICAL ADVICE (OUTPATIENT)
Dept: FAMILY MEDICINE | Facility: OTHER | Age: 47
End: 2022-12-07

## 2022-12-07 NOTE — TELEPHONE ENCOUNTER
I am not certain what she is referring to. She had a staph infection, but typically if the lesions and her symptoms have resolved we do not recheck this. Does she still have a lesion. I know that it was not checked for hepectic as it was known as staph.       KALA Dee CNP  Questions or concerns please feel free to send me a [employer] message or call me  Phone : [phone]

## 2022-12-12 ENCOUNTER — VIRTUAL VISIT (OUTPATIENT)
Dept: FAMILY MEDICINE | Facility: OTHER | Age: 47
End: 2022-12-12
Payer: COMMERCIAL

## 2022-12-12 ENCOUNTER — TELEPHONE (OUTPATIENT)
Dept: FAMILY MEDICINE | Facility: OTHER | Age: 47
End: 2022-12-12

## 2022-12-12 ENCOUNTER — LAB (OUTPATIENT)
Dept: LAB | Facility: OTHER | Age: 47
End: 2022-12-12
Payer: COMMERCIAL

## 2022-12-12 ENCOUNTER — DOCUMENTATION ONLY (OUTPATIENT)
Dept: FAMILY MEDICINE | Facility: OTHER | Age: 47
End: 2022-12-12

## 2022-12-12 DIAGNOSIS — R30.0 DYSURIA: ICD-10-CM

## 2022-12-12 DIAGNOSIS — N76.0 BACTERIAL VAGINOSIS: Primary | ICD-10-CM

## 2022-12-12 DIAGNOSIS — B96.89 BACTERIAL VAGINOSIS: Primary | ICD-10-CM

## 2022-12-12 DIAGNOSIS — R30.0 DYSURIA: Primary | ICD-10-CM

## 2022-12-12 DIAGNOSIS — R14.0 BLOATING: ICD-10-CM

## 2022-12-12 LAB
ALBUMIN UR-MCNC: NEGATIVE MG/DL
APPEARANCE UR: CLEAR
BACTERIA #/AREA URNS HPF: ABNORMAL /HPF
BILIRUB UR QL STRIP: NEGATIVE
CLUE CELLS: PRESENT
COLOR UR AUTO: YELLOW
GLUCOSE UR STRIP-MCNC: NEGATIVE MG/DL
HGB UR QL STRIP: ABNORMAL
KETONES UR STRIP-MCNC: NEGATIVE MG/DL
LEUKOCYTE ESTERASE UR QL STRIP: NEGATIVE
NITRATE UR QL: NEGATIVE
PH UR STRIP: 6.5 [PH] (ref 5–7)
RBC #/AREA URNS AUTO: ABNORMAL /HPF
SP GR UR STRIP: 1.01 (ref 1–1.03)
SQUAMOUS #/AREA URNS AUTO: ABNORMAL /LPF
TRICHOMONAS, WET PREP: ABNORMAL
UROBILINOGEN UR STRIP-ACNC: 0.2 E.U./DL
WBC #/AREA URNS AUTO: ABNORMAL /HPF
WBC'S/HIGH POWER FIELD, WET PREP: ABNORMAL
YEAST, WET PREP: ABNORMAL

## 2022-12-12 PROCEDURE — 87210 SMEAR WET MOUNT SALINE/INK: CPT

## 2022-12-12 PROCEDURE — 99213 OFFICE O/P EST LOW 20 MIN: CPT | Mod: TEL | Performed by: NURSE PRACTITIONER

## 2022-12-12 PROCEDURE — 81001 URINALYSIS AUTO W/SCOPE: CPT

## 2022-12-12 PROCEDURE — 87086 URINE CULTURE/COLONY COUNT: CPT

## 2022-12-12 RX ORDER — METRONIDAZOLE 500 MG/1
500 TABLET ORAL 2 TIMES DAILY
Qty: 14 TABLET | Refills: 0 | Status: SHIPPED | OUTPATIENT
Start: 2022-12-12 | End: 2022-12-19

## 2022-12-12 NOTE — TELEPHONE ENCOUNTER
Please call patient add her to my schedule for a virtual she can do labs before or after our visit.       KALA Dee CNP  Questions or concerns please feel free to send me a [employer] message or call me  Phone : [phone]

## 2022-12-12 NOTE — TELEPHONE ENCOUNTER
Scheduled with KV at 5p. Spoke to lab, patient on the schedule to do a UA on her way home from work around 5-5:15p.

## 2022-12-12 NOTE — PROGRESS NOTES
Santa is a 47 year old who is being evaluated via a billable telephone visit.      What phone number would you like to be contacted at?   How would you like to obtain your AVS? MyChart  Distant Location (provider location):  On-site    Assessment & Plan     Bacterial vaginosis  Recommend antibiotic treatment  Advised treatment and also starting probiotic for vaginal health.   - Urine Culture Aerobic Bacterial - lab collect; Future  - metroNIDAZOLE (FLAGYL) 500 MG tablet; Take 1 tablet (500 mg) by mouth 2 times daily for 7 days    Bloating  As noted. Recommend monitoring if no improvements then follow up with me in clinic.     Advised when to go in for evaluation if symptoms worsen.   The patient indicates understanding of these issues and agrees with the plan.           There are no Patient Instructions on file for this visit.    No follow-ups on file.    KALA Dee [facility]    Julian Pratt is a 47 year old presenting for the following health issues:  No chief complaint on file.      HPI     Symptoms:   Light vaginal discharge.   No pain with urination.   Felt some lower pelvic discomfort and bloated.       Review of Systems         Objective           Vitals:  No vitals were obtained today due to virtual visit.    Physical Exam   healthy, alert and no distress  PSYCH: Alert and oriented times 3; coherent speech, normal   rate and volume, able to articulate logical thoughts, able   to abstract reason, no tangential thoughts, no hallucinations   or delusions  Her affect is normal  RESP: No cough, no audible wheezing, able to talk in full sentences  Remainder of exam unable to be completed due to telephone visits    Results for orders placed or performed in visit on 12/12/22   UA Macro with Reflex to Micro and Culture - lab collect     Status: Abnormal    Specimen: Urine, Clean Catch   Result Value Ref Range    Color Urine Yellow Colorless, Straw, Light Yellow, Yellow     Appearance Urine Clear Clear    Glucose Urine Negative Negative mg/dL    Bilirubin Urine Negative Negative    Ketones Urine Negative Negative mg/dL    Specific Gravity Urine 1.010 1.003 - 1.035    Blood Urine Trace (A) Negative    pH Urine 6.5 5.0 - 7.0    Protein Albumin Urine Negative Negative mg/dL    Urobilinogen Urine 0.2 0.2, 1.0 E.U./dL    Nitrite Urine Negative Negative    Leukocyte Esterase Urine Negative Negative   Urine Microscopic     Status: Abnormal   Result Value Ref Range    Bacteria Urine None Seen None Seen /HPF    RBC Urine 0-2 0-2 /HPF /HPF    WBC Urine 0-5 0-5 /HPF /HPF    Squamous Epithelials Urine Few (A) None Seen /LPF    Narrative    Urine Culture not indicated   Wet prep - lab collect     Status: Abnormal    Specimen: Vagina; Swab   Result Value Ref Range    Trichomonas Absent Absent    Yeast Absent Absent    Clue Cells Present (A) Absent    WBCs/high power field 1+ (A) None               Phone call duration: 7 minutes

## 2022-12-12 NOTE — PROGRESS NOTES
Santa is on the lab schedule for 5:15 today. The appointment note states UA but there is no order. Please place the order so her specimen can be tested at time of collection.    Thank you,      Thaddeus Concepcion, [facility]

## 2022-12-14 LAB — BACTERIA UR CULT: NO GROWTH

## 2022-12-26 NOTE — PROGRESS NOTES
Consult    Subjective:     Joy Dalton is a 78 y.o. female with history of alcohol abuse who fell in her home last night. Since then she complains of severe pain all over as well as extreme left arm pain and difficulty walking. In the ED she was hypertensive and mildly tachycardic. Labs showed a sodium of 126, creatinine 1.36 and a magnesium of 1.1. X-ray of the left arm shows a right proximal to mid humeral shaft fracture with impaction and varus angulation. I was consulted for the same by ER physician     Patient admits to drinking about 2 beers a day although her significant other at the bedside motioned to me that it is significantly more than that. In fact, she was intoxicated last night when she fell   She is supposed to be on Bystolic for hypertension but stopped several weeks ago because she did not want to go into the doctor's office to get a refill    Complains of diffuse pain around the left arm  Movement of the arm is very painful  6-7 out of 10  No tingling or numbness in the hand  Fingers movement okay  Complains of severe bruising on the left arm    Patient been admitted to the hospitalist service because of tachycardia and abnormal BMP    Past Medical History:   Diagnosis Date    HTN (hypertension)     Smoker       Past Surgical History:   Procedure Laterality Date    HX SHOULDER REPLACEMENT Right 11/06/2011    Has fracture surgical neck, has ORIF DONE     Family History   Problem Relation Age of Onset    No Known Problems Mother     No Known Problems Father       Social History     Tobacco Use    Smoking status: Every Day     Packs/day: 0.50     Years: 46.00     Pack years: 23.00     Types: Cigarettes    Smokeless tobacco: Never    Tobacco comments:     will set up later   Substance Use Topics    Alcohol use:  Yes     Alcohol/week: 24.0 standard drinks     Types: 24 Cans of beer per week     Comment: weekly \"couple beers\"       Current Facility-Administered Medications   Medication HPI:  Santa Kinsey is a 42 year old female who is seen in consultation at the request of Julia Osborn CNP.    Pt presents for eval of:   (Onset, Location, L/R, Character, Treatments, Injury if yes)     Ongoing for about a year, callus/wart plantar Left foot base of 4th toe.  Sharp, stabbing, throbbing, dull ache, pain 7 w/pressure  1 liquid nitrogen clinic treatment, OTC wart treatments    Works as a [employer]. 40 hour work weeks on feet 1/2 of day    BMI is normal.    Review of Systems:  Patient denies fever, chills, rash, wound, stiffness, limping, numbness, weakness, heart burn, blood in stool, chest pain with activity, calf pain when walking, shortness of breath with activity, chronic cough, easy bleeding/bruising, swelling of ankles, excessive thirst, fatigue, depression, anxiety.       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     Other, mixed, or unspecified nondependent drug abuse, unspecified     Drug /ETOH abuse (non-depend.)        PAST SURGICAL HISTORY:   Past Surgical History:   Procedure Laterality Date     C APPENDECTOMY          MEDICATIONS:   Current Outpatient Prescriptions:      hydrOXYzine (ATARAX) 25 MG tablet, Take 1-2 tablets (25-50 mg) by mouth every 6 hours as needed for itching, Disp: 30 tablet, Rfl: 0     hydrocortisone 2.5 % cream, Apply thin layer to affected areas twice daily as needed for itch, Disp: 60 g, Rfl: 0     hydrocortisone (ANUSOL-HC) 2.5 % cream, Place rectally 2 times daily (Patient not taking: Reported on 9/25/2017), Disp: 28.35 g, Rfl: 0     ALLERGIES:    Allergies   Allergen Reactions     No Known Drug Allergies         SOCIAL HISTORY:   Social History     Social History     Marital status:      Spouse name: N/A     Number of children: N/A     Years of education: N/A     Occupational History     Not on file.  "    Social History Main Topics     Smoking status: Current Every Day Smoker     Packs/day: 0.75     Years: 8.00     Smokeless tobacco: Never Used     Alcohol use No     Drug use: No     Sexual activity: Not Currently     Partners: Male     Birth control/ protection: Surgical     Other Topics Concern      Service No     Blood Transfusions No     Caffeine Concern No     Occupational Exposure No     Hobby Hazards No     Sleep Concern No     Stress Concern Yes     Work, family.     Weight Concern No     Special Diet No     Back Care No     Exercise Yes     Bike Helmet Yes     Seat Belt Yes     Self-Exams No     hand-out of sbe given to patient     Parent/Sibling W/ Cabg, Mi Or Angioplasty Before 65f 55m? No     Social History Narrative        FAMILY HISTORY:   Family History   Problem Relation Age of Onset     CANCER Paternal Grandfather      Breast Cancer Paternal Aunt         EXAM:Vitals: Temp 98.3  F (36.8  C) (Temporal)  Ht 5' 3.19\" (1.605 m)  Wt 122 lb (55.3 kg)  BMI 21.48 kg/m2  BMI= Body mass index is 21.48 kg/(m^2).    General appearance: Patient is alert and fully cooperative with history & exam.  No sign of distress is noted during the visit.     Psychiatric: Affect is pleasant & appropriate.  Patient appears motivated to improve health.     Respiratory: Breathing is regular & unlabored while sitting.     HEENT: Hearing is intact to spoken word.  Speech is clear.  No gross evidence of visual impairment that would impact ambulation.     Vascular: DP & PT pulses are intact & regular bilaterally.  No significant edema or varicosities noted.  CFT and skin temperature is normal to both lower extremities.     Neurologic: Lower extremity sensation is intact to light touch.  No evidence of weakness or contracture in the lower extremities.  No evidence of neuropathy.    Dermatologic: Skin is intact to both lower extremities with normal texture, turgor and tone.  A nucleated hyperkeratotic mass is noted about " Dose Route Frequency Provider Last Rate Last Admin    0.9% sodium chloride infusion  100 mL/hr IntraVENous CONTINUOUS Matt Saucedo  mL/hr at 12/26/22 0541 100 mL/hr at 12/26/22 0541    ketamine (KETALAR) 50 mg/mL injection 25 mg  0.5 mg/kg IntraVENous ONCE PRN Matt Saucedo MD        propofoL (DIPRIVAN) 10 mg/mL injection 25 mg  0.5 mg/kg IntraVENous ONCE PRN Matt Dudley MD        sodium chloride (NS) flush 5-40 mL  5-40 mL IntraVENous Q8H Shoshana Licea MD   10 mL at 12/26/22 0541    sodium chloride (NS) flush 5-40 mL  5-40 mL IntraVENous PRN Shoshana Licea MD        acetaminophen (TYLENOL) tablet 650 mg  650 mg Oral Q6H PRN Shoshana Licea MD        Or    acetaminophen (TYLENOL) suppository 650 mg  650 mg Rectal Q6H PRN Shoshana Licea MD        polyethylene glycol (MIRALAX) packet 17 g  17 g Oral DAILY PRN Shoshana Licea MD        promethazine (PHENERGAN) tablet 12.5 mg  12.5 mg Oral Q6H PRN Shoshana Licea MD        Or    ondansetron [facility]) injection 4 mg  4 mg IntraVENous Q6H PRN Shoshana Licea MD        enoxaparin (LOVENOX) injection 30 mg  30 mg SubCUTAneous DAILY Shoshana Licea MD        diazePAM (VALIUM) tablet 10 mg  10 mg Oral Q1H PRN Shoshana Licea MD        diazePAM (VALIUM) tablet 20 mg  20 mg Oral Q1H PRN Shoshana Licea MD        0.9% sodium chloride 4,107 mL with folic acid 1 mg, thiamine 100 mg, mvi (adult no. 4 with vit K) 10 mL infusion   IntraVENous DAILY Shoshana Licea  mL/hr at 12/26/22 1155 New Bag at 12/26/22 1155    multivitamin, tx-iron-ca-min (THERA-M w/ IRON) tablet 1 Tablet  1 Tablet Oral DAILY Shoshana Licea MD   1 Tablet at 12/26/22 1100    atenoloL (TENORMIN) tablet 25 mg  25 mg Oral DAILY Shoshana Licea MD   25 mg at 12/26/22 1100    HYDROcodone-acetaminophen (NORCO) 5-325 mg per tablet 1 Tablet  1 Tablet Oral Q6H PRN Shoshana Licea MD   1 Tablet at 12/26/22 1100    albuterol-ipratropium (DUO-NEB) 2.5 MG-0.5 MG/3 ML 3 mL Nebulization Q6H PRN Matt Patton MD        acetylcysteine (MUCOMYST) 200 mg/mL (20 %) solution 200 mg  200 mg Nebulization Q6HWA RT Sadi aRy MD            Allergies   Allergen Reactions    Sulfa (Sulfonamide Antibiotics) Swelling    Pcn [Penicillins] Rash    Prednisone Other (comments)     Patient states \"it makes me bounce off of the walls\"; declines taking     Darvocet A500 [Propoxyphene N-Acetaminophen] Rash        Review of Systems:  Patient is alert and oriented x3    Objective: Intake and Output:    No intake/output data recorded. 12/24 1901 - 12/26 0700  In: 1050 [I.V.:1050]  Out: -     Physical Exam:   Diffuse tenderness around left arm  Diffuse swelling around the left arm  Fracture deformity  Any movement of lower extremities painful  Arm in arm sling  Severe ecchymosis starting from proximal humerus up to the fingertips  Skin is very fragile  Radial pulse +2  Sensation intact  Fingers movement full    Data Review:   Recent Results (from the past 24 hour(s))   CBC WITH AUTOMATED DIFF    Collection Time: 12/25/22  2:44 PM   Result Value Ref Range    WBC 10.0 3.6 - 11.0 K/uL    RBC 3.15 (L) 3.80 - 5.20 M/uL    HGB 11.8 11.5 - 16.0 g/dL    HCT 32.9 (L) 35.0 - 47.0 %    .4 (H) 80.0 - 99.0 FL    MCH 37.5 (H) 26.0 - 34.0 PG    MCHC 35.9 30.0 - 36.5 g/dL    RDW 11.6 11.5 - 14.5 %    PLATELET 167 809 - 662 K/uL    MPV 9.7 8.9 - 12.9 FL    NRBC 0.0 0.0  WBC    ABSOLUTE NRBC 0.00 0.00 - 0.01 K/uL    NEUTROPHILS 79 (H) 32 - 75 %    LYMPHOCYTES 8 (L) 12 - 49 %    MONOCYTES 12 5 - 13 %    EOSINOPHILS 0 0 - 7 %    BASOPHILS 0 0 - 1 %    IMMATURE GRANULOCYTES 1 (H) 0 - 0.5 %    ABS. NEUTROPHILS 8.0 1.8 - 8.0 K/UL    ABS. LYMPHOCYTES 0.8 0.8 - 3.5 K/UL    ABS. MONOCYTES 1.2 (H) 0.0 - 1.0 K/UL    ABS. EOSINOPHILS 0.0 0.0 - 0.4 K/UL    ABS. BASOPHILS 0.0 0.0 - 0.1 K/UL    ABS. IMM.  GRANS. 0.1 (H) 0.00 - 0.04 K/UL    DF AUTOMATED     TYPE & SCREEN    Collection Time: 12/25/22  2:44 PM the left foot at the base of the fourth toe. Pinpoint bleeding upon debridement and loss of skin lines noted consistent with verruca.    Musculoskeletal: Patient is ambulatory without assistive device or brace.  No gross ankle deformity noted.  No foot or ankle joint effusion is noted.      ASSESSMENT:       ICD-10-CM    1. Plantar verruca B07.0         PLAN:  Reviewed patient's chart in epic.  Discussed treatment options. Treatment options include multiple applications of freezing, cantharidin, salicylic acid with occlusive dressing, prescription antiviral topicals, or surgical excision or ablation.     After discussing these options and potential outcomes and post op care the patient wishes to pursue topical treatments.  I pared the lesion/s to a bleeding base and applied 3 applications of liquid nitrogen with history jet for 5-6 seconds each. Sterile dressing was applied.  Patient was instructed to exsanguinate any blister that forms and protect with a bandage.  As soon as tolerated the patient was instructed to apply over-the-counter topical liquid wart remover under occlusive dressing 24 hours per day 7 days per week as tolerated. Follow up with me in 3 weeks for debridement for comfort, reevaluation and further treatment if needed.  I explained this may take multiple applications to be of benefit. Topical treatments must remain consistent 24/7 until resolved, discontinuing treatment for a short period of time will eliminate efficacy. All questions were answered to their satisfaction. Written instructions were dispensed.  We also discussed alternative treatment options including surgical excision however this will require nonweightbearing for as much as one month.      Nacho Lema DPM     Result Value Ref Range    Crossmatch Expiration 12/28/2022,2359     ABO/Rh(D) O Negative     Antibody screen Negative    PROTHROMBIN TIME + INR    Collection Time: 12/25/22  2:44 PM   Result Value Ref Range    Prothrombin time 13.6 11.9 - 14.6 sec    INR 1.0 0.9 - 1.1     METABOLIC PANEL, COMPREHENSIVE    Collection Time: 12/25/22  2:44 PM   Result Value Ref Range    Sodium 126 (L) 136 - 145 mmol/L    Potassium 3.9 3.5 - 5.1 mmol/L    Chloride 92 (L) 97 - 108 mmol/L    CO2 21 21 - 32 mmol/L    Anion gap 13 5 - 15 mmol/L    Glucose 121 (H) 65 - 100 mg/dL    BUN 19 6 - 20 mg/dL    Creatinine 1.36 (H) 0.55 - 1.02 mg/dL    BUN/Creatinine ratio 14 12 - 20      eGFR 42 (L) >60 ml/min/1.73m2    Calcium 7.4 (L) 8.5 - 10.1 mg/dL    Bilirubin, total 0.9 0.2 - 1.0 mg/dL    AST (SGOT) 53 (H) 15 - 37 U/L    ALT (SGPT) 28 12 - 78 U/L    Alk.  phosphatase 93 45 - 117 U/L    Protein, total 5.4 (L) 6.4 - 8.2 g/dL    Albumin 2.6 (L) 3.5 - 5.0 g/dL    Globulin 2.8 2.0 - 4.0 g/dL    A-G Ratio 0.9 (L) 1.1 - 2.2     TROPONIN-HIGH SENSITIVITY    Collection Time: 12/25/22  2:44 PM   Result Value Ref Range    Troponin-High Sensitivity 25 0 - 51 ng/L   LIPASE    Collection Time: 12/25/22  2:44 PM   Result Value Ref Range    Lipase 123 73 - 393 U/L   MAGNESIUM    Collection Time: 12/25/22  2:44 PM   Result Value Ref Range    Magnesium 1.1 (L) 1.6 - 2.4 mg/dL   ETHYL ALCOHOL    Collection Time: 12/25/22  2:44 PM   Result Value Ref Range    ALCOHOL(ETHYL),SERUM <10 <10 mg/dL   CK    Collection Time: 12/25/22  2:44 PM   Result Value Ref Range     (H) 26 - 192 U/L   EKG, 12 LEAD, INITIAL    Collection Time: 12/25/22  2:46 PM   Result Value Ref Range    Ventricular Rate 89 BPM    Atrial Rate 89 BPM    P-R Interval 112 ms    QRS Duration 60 ms    Q-T Interval 378 ms    QTC Calculation (Bezet) 459 ms    Calculated R Axis 81 degrees    Calculated T Axis 97 degrees    Diagnosis       Sinus rhythm with Premature atrial complexes  Otherwise normal ECG  No previous ECGs available  Confirmed by URBAN MONTALVO, [facility] ([id number]) on 12/26/2022 10:06:57 AM     TROPONIN-HIGH SENSITIVITY    Collection Time: 12/25/22  5:09 PM   Result Value Ref Range    Troponin-High Sensitivity 25 0 - 51 ng/L   MAGNESIUM    Collection Time: 12/26/22  7:05 AM   Result Value Ref Range    Magnesium 2.8 (H) 1.6 - 2.4 mg/dL   METABOLIC PANEL, BASIC    Collection Time: 12/26/22  7:05 AM   Result Value Ref Range    Sodium 132 (L) 136 - 145 mmol/L    Potassium 3.8 3.5 - 5.1 mmol/L    Chloride 99 97 - 108 mmol/L    CO2 22 21 - 32 mmol/L    Anion gap 11 5 - 15 mmol/L    Glucose 97 65 - 100 mg/dL    BUN 20 6 - 20 mg/dL    Creatinine 1.11 (H) 0.55 - 1.02 mg/dL    BUN/Creatinine ratio 18 12 - 20      eGFR 54 (L) >60 ml/min/1.73m2    Calcium 7.8 (L) 8.5 - 10.1 mg/dL   CBC W/O DIFF    Collection Time: 12/26/22  7:05 AM   Result Value Ref Range    WBC 7.3 3.6 - 11.0 K/uL    RBC 2.65 (L) 3.80 - 5.20 M/uL    HGB 10.1 (L) 11.5 - 16.0 g/dL    HCT 28.4 (L) 35.0 - 47.0 %    .2 (H) 80.0 - 99.0 FL    MCH 38.1 (H) 26.0 - 34.0 PG    MCHC 35.6 30.0 - 36.5 g/dL    RDW 11.7 11.5 - 14.5 %    PLATELET 880 269 - 256 K/uL    MPV 9.9 8.9 - 12.9 FL    NRBC 0.0 0.0  WBC    ABSOLUTE NRBC 0.00 0.00 - 0.01 K/uL       X-rays of the left humerus and shoulder are reviewed. There is highly comminuted proximal humerus fracture. Fracture line does not seem to be extending into articular surface of humeral head. Assessment:     Severely comminuted proximal humerus fracture, left    Plan:     -X-rays and clinical findings discussed with the patient  -I discussed that she has very comminuted proximal humerus fracture. Operative versus nonoperative management can be considered. Operative treatment might have less chance of nonunion as well as will have more stability early on compared to nonoperative treatment.   We were considering operative treatment but her labs are abnormal and she will need cardiac clearance. Also her skin condition because of significant ecchymosis and fragile skin, it is not optimal to proceed with surgery at this point.  -I recommended for allowing swelling and ecchymosis to improve some before considering surgical treatment. By that time if fracture starts healing with significant amount of callus, nonoperative treatment can also be considered  -There might be high risk of surgical treatment including wound healing complications and nonunion  -We will reconsider treatment modality after 10 to 14 days when skin condition improves  -Meanwhile keep using arm sling for stabilization. Ace wrap can be wrapped around the arm and the chest to provide additional support  -Oral pain medication per admitting team  -Okay to discharge from orthopedic standpoint when medically stable  -Follow-up after 10 days with [facility] orthopedic.   Call [phone] for follow-up appointment

## 2022-12-29 ENCOUNTER — MYC MEDICAL ADVICE (OUTPATIENT)
Dept: FAMILY MEDICINE | Facility: OTHER | Age: 47
End: 2022-12-29

## 2022-12-29 ENCOUNTER — E-VISIT (OUTPATIENT)
Dept: FAMILY MEDICINE | Facility: OTHER | Age: 47
End: 2022-12-29
Payer: COMMERCIAL

## 2022-12-29 DIAGNOSIS — J32.9 SINUSITIS, UNSPECIFIED CHRONICITY, UNSPECIFIED LOCATION: Primary | ICD-10-CM

## 2022-12-29 PROCEDURE — 99421 OL DIG E/M SVC 5-10 MIN: CPT | Performed by: PHYSICIAN ASSISTANT

## 2022-12-29 NOTE — TELEPHONE ENCOUNTER
Recommend E-visit      KALA Dee CNP  Questions or concerns please feel free to send me a [place] message or call me  Phone : [phone]

## 2023-07-13 ENCOUNTER — MYC MEDICAL ADVICE (OUTPATIENT)
Dept: FAMILY MEDICINE | Facility: OTHER | Age: 48
End: 2023-07-13
Payer: COMMERCIAL

## 2023-07-17 NOTE — TELEPHONE ENCOUNTER
Use same day if she KV has any, otherwise she may want to see another provider or OB/GYN.     Lois Griffith PA-C

## 2023-07-17 NOTE — TELEPHONE ENCOUNTER
Julia, are you able to work this patient in sometime soon?     She has been having some abnormal bleeding. Would you consider her seeing OB?       DEBORA WangN, RN, PHN  [facility]  July 17, 2023

## 2023-07-21 ENCOUNTER — MYC MEDICAL ADVICE (OUTPATIENT)
Dept: FAMILY MEDICINE | Facility: OTHER | Age: 48
End: 2023-07-21
Payer: COMMERCIAL

## 2023-07-24 ENCOUNTER — OFFICE VISIT (OUTPATIENT)
Dept: FAMILY MEDICINE | Facility: OTHER | Age: 48
End: 2023-07-24
Payer: COMMERCIAL

## 2023-07-24 VITALS
BODY MASS INDEX: 19.37 KG/M2 | OXYGEN SATURATION: 99 % | RESPIRATION RATE: 18 BRPM | DIASTOLIC BLOOD PRESSURE: 74 MMHG | SYSTOLIC BLOOD PRESSURE: 102 MMHG | WEIGHT: 110 LBS | TEMPERATURE: 98.3 F | HEART RATE: 77 BPM

## 2023-07-24 DIAGNOSIS — Z12.4 CERVICAL CANCER SCREENING: ICD-10-CM

## 2023-07-24 DIAGNOSIS — N93.9 VAGINAL BLEEDING: Primary | ICD-10-CM

## 2023-07-24 LAB
CLUE CELLS: ABNORMAL
TRICHOMONAS, WET PREP: ABNORMAL
WBC'S/HIGH POWER FIELD, WET PREP: ABNORMAL
YEAST, WET PREP: ABNORMAL

## 2023-07-24 PROCEDURE — G0145 SCR C/V CYTO,THINLAYER,RESCR: HCPCS | Performed by: NURSE PRACTITIONER

## 2023-07-24 PROCEDURE — 99214 OFFICE O/P EST MOD 30 MIN: CPT | Performed by: NURSE PRACTITIONER

## 2023-07-24 PROCEDURE — G0124 SCREEN C/V THIN LAYER BY MD: HCPCS

## 2023-07-24 PROCEDURE — 87624 HPV HI-RISK TYP POOLED RSLT: CPT | Performed by: NURSE PRACTITIONER

## 2023-07-24 PROCEDURE — 87210 SMEAR WET MOUNT SALINE/INK: CPT | Performed by: NURSE PRACTITIONER

## 2023-07-24 ASSESSMENT — ANXIETY QUESTIONNAIRES
IF YOU CHECKED OFF ANY PROBLEMS ON THIS QUESTIONNAIRE, HOW DIFFICULT HAVE THESE PROBLEMS MADE IT FOR YOU TO DO YOUR WORK, TAKE CARE OF THINGS AT HOME, OR GET ALONG WITH OTHER PEOPLE: NOT DIFFICULT AT ALL
GAD7 TOTAL SCORE: 2
4. TROUBLE RELAXING: SEVERAL DAYS
1. FEELING NERVOUS, ANXIOUS, OR ON EDGE: NOT AT ALL
GAD7 TOTAL SCORE: 2
7. FEELING AFRAID AS IF SOMETHING AWFUL MIGHT HAPPEN: NOT AT ALL
5. BEING SO RESTLESS THAT IT IS HARD TO SIT STILL: NOT AT ALL
6. BECOMING EASILY ANNOYED OR IRRITABLE: SEVERAL DAYS
3. WORRYING TOO MUCH ABOUT DIFFERENT THINGS: NOT AT ALL
2. NOT BEING ABLE TO STOP OR CONTROL WORRYING: NOT AT ALL

## 2023-07-24 ASSESSMENT — PAIN SCALES - GENERAL: PAINLEVEL: NO PAIN (0)

## 2023-07-24 ASSESSMENT — PATIENT HEALTH QUESTIONNAIRE - PHQ9
SUM OF ALL RESPONSES TO PHQ QUESTIONS 1-9: 1
10. IF YOU CHECKED OFF ANY PROBLEMS, HOW DIFFICULT HAVE THESE PROBLEMS MADE IT FOR YOU TO DO YOUR WORK, TAKE CARE OF THINGS AT HOME, OR GET ALONG WITH OTHER PEOPLE: NOT DIFFICULT AT ALL
SUM OF ALL RESPONSES TO PHQ QUESTIONS 1-9: 1

## 2023-07-24 NOTE — LETTER
August 24, 2023      Santa SIMON Neto  [address] [address] [facility] [address]        Dear ,    This letter is regarding your recent Pap smear and Human Papillomavirus (HPV) test.    Your results showed Atypical Squamous Cells of Undetermined Significance (ASCUS) and HPV negative, meaning that no high-risk HPV was found at this time.    It is recommended that you have your next Pap smear and Human Papillomavirus (HPV) test in 3 years.    If you have additional questions regarding this result, please contact our Registered Nurse, Aditi, at [phone].      Sincerely,    Your [facility] Care Team

## 2023-07-24 NOTE — PROGRESS NOTES
Assessment & Plan     Vaginal bleeding  Uncertain cause at this time  Recommend labs, pap today (since she is due) and pelvic ultrasound  Follow up depending on labs.   - Wet prep - lab collect; Future  - Wet prep - lab collect  - US Pelvic Complete with Transvaginal; Future    Cervical cancer screening  Recommend if normal repeat in 5 years.   - Pap Screen with HPV - recommended age 30 - 65 years  - HPV Hold (Lab Only)     The patient indicates understanding of these issues and agrees with the plan.      KALA Dee CNP  [facility] BOOM Pratt is a 48 year old, presenting for the following health issues:  No chief complaint on file.      7/24/2023     4:53 PM   Additional Questions   Roomed by Lorena     History of Present Illness       Reason for visit:  Menstral  Symptom onset:  3-4 weeks ago    She eats 2-3 servings of fruits and vegetables daily.She consumes 7 sweetened beverage(s) daily.She exercises with enough effort to increase her heart rate 30 to 60 minutes per day.  She exercises with enough effort to increase her heart rate 3 or less days per week.   She is taking medications regularly.       Menstrual Concern  Onset/Duration: 20 days   Description:   Duration of bleeding episodes: 20 days  Frequency of periods: (1st day of one to 1st day of next):  every  25-30 days  Describe bleeding/flow:   Clots: YES  Number of pads/day: 4 boxes regular and heavy flow tampons         Cramping: None   Accompanying Signs & Symptoms:  Lightheadedness: No  Temperature intolerance: No  Nosebleeds/Easy bruising: No  Vaginal Discharge: No  Acne: No  Change in body hair: No  History:  No LMP recorded.  Previous normal periods: YES  Contraceptive use: NO  Sexually active: YES  Any bleeding after intercourse: No  Abnormal PAP Smears: No  Precipitating or alleviating factors: None  Therapies tried and outcome: None  Got period on 1st of July and then had it almost all of July to  the 20th.   The 21st she did not have it at all   Normally       Review of Systems   Constitutional, HEENT, cardiovascular, pulmonary, gi and gu systems are negative, except as otherwise noted.      Objective    /74   Pulse 77   Temp 98.3  F (36.8  C) (Temporal)   Resp 18   Wt 49.9 kg (110 lb)   SpO2 99%   BMI 19.37 kg/m    Body mass index is 19.37 kg/m .  Physical Exam   GENERAL: healthy, alert and no distress  ABDOMEN: soft, nontender, no hepatosplenomegaly, no masses and bowel sounds normal  PSYCH: mentation appears normal, affect normal/bright    Results for orders placed or performed in visit on 07/24/23   Wet prep - lab collect     Status: Abnormal    Specimen: Vagina; Swab   Result Value Ref Range    Trichomonas Absent Absent    Yeast Absent Absent    Clue Cells Absent Absent    WBCs/high power field 2+ (A) None

## 2023-07-30 LAB
BKR LAB AP GYN ADEQUACY: ABNORMAL
BKR LAB AP GYN INTERPRETATION: ABNORMAL
BKR LAB AP HPV REFLEX: ABNORMAL
BKR LAB AP PREVIOUS ABNORMAL: ABNORMAL
PATH REPORT.COMMENTS IMP SPEC: ABNORMAL
PATH REPORT.COMMENTS IMP SPEC: ABNORMAL
PATH REPORT.RELEVANT HX SPEC: ABNORMAL

## 2023-08-01 LAB
HUMAN PAPILLOMA VIRUS 16 DNA: NEGATIVE
HUMAN PAPILLOMA VIRUS 18 DNA: NEGATIVE
HUMAN PAPILLOMA VIRUS FINAL DIAGNOSIS: NORMAL
HUMAN PAPILLOMA VIRUS OTHER HR: NEGATIVE

## 2023-09-28 ENCOUNTER — PATIENT OUTREACH (OUTPATIENT)
Dept: CARE COORDINATION | Facility: CLINIC | Age: 48
End: 2023-09-28
Payer: COMMERCIAL

## 2023-10-02 ENCOUNTER — PATIENT OUTREACH (OUTPATIENT)
Dept: CARE COORDINATION | Facility: CLINIC | Age: 48
End: 2023-10-02
Payer: COMMERCIAL

## 2023-10-12 ENCOUNTER — PATIENT OUTREACH (OUTPATIENT)
Dept: CARE COORDINATION | Facility: CLINIC | Age: 48
End: 2023-10-12
Payer: COMMERCIAL

## 2023-10-30 ENCOUNTER — PATIENT OUTREACH (OUTPATIENT)
Dept: CARE COORDINATION | Facility: CLINIC | Age: 48
End: 2023-10-30
Payer: COMMERCIAL

## 2024-01-06 ENCOUNTER — HEALTH MAINTENANCE LETTER (OUTPATIENT)
Age: 49
End: 2024-01-06

## 2025-01-25 ENCOUNTER — HEALTH MAINTENANCE LETTER (OUTPATIENT)
Age: 50
End: 2025-01-25

## (undated) DEVICE — PREP CHLORAPREP 26ML TINTED ORANGE  260815

## (undated) DEVICE — KIT ENDO FIRST STEP DISINFECTANT 200ML W/POUCH EP-4

## (undated) DEVICE — PAD CHUX UNDERPAD 23X24" 7136

## (undated) RX ORDER — SIMETHICONE 40MG/0.6ML
SUSPENSION, DROPS(FINAL DOSAGE FORM)(ML) ORAL
Status: DISPENSED
Start: 2021-04-02